# Patient Record
Sex: FEMALE | Race: WHITE | NOT HISPANIC OR LATINO | Employment: OTHER | URBAN - METROPOLITAN AREA
[De-identification: names, ages, dates, MRNs, and addresses within clinical notes are randomized per-mention and may not be internally consistent; named-entity substitution may affect disease eponyms.]

---

## 2017-08-29 ENCOUNTER — HOSPITAL ENCOUNTER (OUTPATIENT)
Dept: RADIOLOGY | Facility: MEDICAL CENTER | Age: 67
End: 2017-08-29
Attending: SPECIALIST
Payer: MEDICARE

## 2017-08-29 DIAGNOSIS — R05.9 COUGH: ICD-10-CM

## 2017-08-29 PROCEDURE — 71020 DX-CHEST-2 VIEWS: CPT

## 2017-09-12 ENCOUNTER — HOSPITAL ENCOUNTER (OUTPATIENT)
Dept: RADIOLOGY | Facility: MEDICAL CENTER | Age: 67
End: 2017-09-12
Attending: SPECIALIST
Payer: MEDICARE

## 2017-09-12 DIAGNOSIS — R91.1 LUNG NODULE: ICD-10-CM

## 2017-09-12 PROCEDURE — 700117 HCHG RX CONTRAST REV CODE 255: Performed by: SPECIALIST

## 2017-09-12 PROCEDURE — 71260 CT THORAX DX C+: CPT

## 2017-09-12 RX ADMIN — IOHEXOL 100 ML: 350 INJECTION, SOLUTION INTRAVENOUS at 09:45

## 2017-09-15 ENCOUNTER — OFFICE VISIT (OUTPATIENT)
Dept: PULMONOLOGY | Facility: HOSPICE | Age: 67
End: 2017-09-15
Payer: MEDICARE

## 2017-09-15 ENCOUNTER — HOSPITAL ENCOUNTER (OUTPATIENT)
Dept: LAB | Facility: MEDICAL CENTER | Age: 67
End: 2017-09-15
Attending: INTERNAL MEDICINE
Payer: MEDICARE

## 2017-09-15 VITALS
HEIGHT: 63 IN | SYSTOLIC BLOOD PRESSURE: 120 MMHG | WEIGHT: 163 LBS | BODY MASS INDEX: 28.88 KG/M2 | DIASTOLIC BLOOD PRESSURE: 74 MMHG | OXYGEN SATURATION: 92 % | TEMPERATURE: 97.3 F | HEART RATE: 84 BPM | RESPIRATION RATE: 16 BRPM

## 2017-09-15 DIAGNOSIS — J47.9 BRONCHIECTASIS WITHOUT COMPLICATION (HCC): ICD-10-CM

## 2017-09-15 LAB
ERYTHROCYTE [SEDIMENTATION RATE] IN BLOOD BY WESTERGREN METHOD: 15 MM/HOUR (ref 0–30)
RHEUMATOID FACT SER IA-ACNC: <10 IU/ML (ref 0–14)

## 2017-09-15 PROCEDURE — 82784 ASSAY IGA/IGD/IGG/IGM EACH: CPT | Mod: 91

## 2017-09-15 PROCEDURE — 99204 OFFICE O/P NEW MOD 45 MIN: CPT | Performed by: INTERNAL MEDICINE

## 2017-09-15 PROCEDURE — 82784 ASSAY IGA/IGD/IGG/IGM EACH: CPT

## 2017-09-15 PROCEDURE — 86431 RHEUMATOID FACTOR QUANT: CPT

## 2017-09-15 PROCEDURE — 82785 ASSAY OF IGE: CPT

## 2017-09-15 PROCEDURE — 36415 COLL VENOUS BLD VENIPUNCTURE: CPT

## 2017-09-15 PROCEDURE — 86606 ASPERGILLUS ANTIBODY: CPT | Mod: 91

## 2017-09-15 PROCEDURE — 85652 RBC SED RATE AUTOMATED: CPT

## 2017-09-15 PROCEDURE — 81220 CFTR GENE COM VARIANTS: CPT

## 2017-09-15 RX ORDER — DIAZEPAM 2 MG/1
4 TABLET ORAL
COMMUNITY
Start: 2017-08-29

## 2017-09-15 RX ORDER — ALBUTEROL SULFATE 90 UG/1
2 AEROSOL, METERED RESPIRATORY (INHALATION) EVERY 4 HOURS PRN
Qty: 1 INHALER | Refills: 11 | Status: SHIPPED
Start: 2017-09-15 | End: 2020-02-24

## 2017-09-15 NOTE — PATIENT INSTRUCTIONS
1. Recommend using the flutter device 3 times a day  2. Recommend using the saline nebulizers 3 times a day  3. We have ordered blood work  4. We have ordered pulmonary function testing  4. We have ordered a sputum culture  5. Recommend using the albuterol inhaler 3 times a day  6. Recommend follow-up in 4-5 weeks

## 2017-09-15 NOTE — PROGRESS NOTES
Betina Petit is a 67 y.o. female here for bronchiectasis.  Patient was referred by self.    History of Present Illness:    The patient is a 59-year-old female comes in for evaluation of an abnormal CT scan of the chest. She's had recurrent pneumonias and frequent coughing and mucus production. This is been going on for many years. In fact CT scan of the chest done in  also showed evidence of bronchiectasis. She had a recent chest x-ray which showed a right middle lobe infiltrate and a possible small nodule in the right midlung. A CT scan of the chest was then performed which showed extensive bronchiectasis with multiple areas of mucous plugging. The patient has no history of COPD or asthma. She does not take any inhalers. She smoked about one pack a day for 20 years quitting at the age of 34 years old. She does have a history of pancreatic insufficiency. She says her mother  of respiratory problems. She was also smoker. She denies any fevers chills or night sweats. She's had no hemoptysis.    Constitutional:  Negative for fever, chills, sweats, and fatigue.  Eyes:  Negative for eye pain and visual changes.  HENT:  Negative for tinnitus and hoarse voice.  Cardiovascular:  Negative for chest pain, leg swelling, syncope and orthopnea.  Respiratory:  See HPI for pertinent negatives  Sleep:  Negative for somnolence, loud snoring, sleep disturbance due to breathing, insomnia.  Gastrointestinal:  Negative for dysphagia, nausea and abdominal pain.  Heme/lymph:  Denies easy bruising, blood clots.  Musculoskeletal:  Negative for arthralgias, sore muscles and back pain.  Skin:  Negative for rash and color change.  Neurological:  Negative for headaches, lightheadedness and weakness.  Psychiatric:  Denies depression.    Current Outpatient Prescriptions   Medication Sig Dispense Refill   • diazepam (VALIUM) 2 MG Tab      • sterile water SOLN 2.1 mL with sodium chloride 4 mEq/mL SOLN 3.6 mEq 3 mL by Nebulization route 3  times a day. 90 Ampule 11   • albuterol (PROAIR HFA) 108 (90 Base) MCG/ACT Aero Soln inhalation aerosol Inhale 2 Puffs by mouth every four hours as needed for Shortness of Breath (wheezing). 1 Inhaler 11   • Pancrelipase, Lip-Prot-Amyl, 86621 UNITS Cap DR Particles Take  by mouth every day.     • CHOLESTYRAMINE PO Take  by mouth every day.     • hydrocodone-acetaminophen (NORCO) 5-325 MG Tab per tablet Take 1-2 Tabs by mouth every 6 hours as needed. 30 Tab 0   • oxycodone-acetaminophen (PERCOCET) 7.5-325 MG per tablet Take 1-2 Tabs by mouth every four hours as needed for Moderate Pain. (Patient not taking: Reported on 9/15/2017) 40 Tab 0   • cephALEXin (KEFLEX) 500 MG Cap Take 1 Cap by mouth every 6 hours. (Patient not taking: Reported on 9/15/2017) 40 Cap 0     No current facility-administered medications for this visit.        Social History   Substance Use Topics   • Smoking status: Former Smoker     Packs/day: 1.00     Years: 20.00     Types: Cigarettes     Start date: 1/1/1964     Quit date: 1/1/1984   • Smokeless tobacco: Never Used   • Alcohol use Yes      Comment: 5 per week       Past Medical History:   Diagnosis Date   • Pneumonia 06/2015   • Cancer (CMS-HCC) 2015    skin ca   • Bronchitis 2010   • Coughing blood     intermittent cough unsure if allergies   • Heart burn    • Indigestion    • Other specified disorder of intestines     diverticuli   • Other specified symptom associated with female genital organs     pelvic prolapse   • Pain     intestines       Past Surgical History:   Procedure Laterality Date   • HERNIA REPAIR  4/4/2016    Procedure: Abdominal wall HERNIA REPAIR  ;  Surgeon: Brandt Munson Jr., M.D.;  Location: SURGERY [facility];  Service:    • FLAP FREE Bilateral 4/4/2016    Procedure: bilateral muscle flap;  Surgeon: Brandt Munson Jr., M.D.;  Location: SURGERY [facility];  Service:    • LYSIS ADHESIONS GENERAL  4/4/2016    Procedure: LYSIS ADHESIONS GENERAL;  Surgeon:  "Brandt Munson Jr., M.D.;  Location: SURGERY [facility];  Service:    • COLOSTOMY TAKEDOWN  9/7/2015    Procedure: COLOSTOMY REVERSAL;  Surgeon: Jessica Flores M.D.;  Location: SURGERY [facility];  Service:    • COLON RESECTION LAPAROSCOPIC  6/22/2015    Procedure: COLON RESECTION LAPAROSCOPIC TO OPEN SIGMOID COLECTOMY;  Surgeon: Jessica Flores M.D.;  Location: SURGERY [facility];  Service:    • COLOSTOMY  6/22/2015    Procedure: COLOSTOMY POSSIBLE;  Surgeon: Jessica Flores M.D.;  Location: SURGERY [facility];  Service:    • CHOLECYSTECTOMY         Allergies:  Bee venom; Flagyl [metronidazole]; Sulfa drugs; and Tape    Family History   Problem Relation Age of Onset   • Cancer Mother    • Alcohol abuse Father    • Cancer Daughter        Physical Examination    Vitals:    09/15/17 0958   Height: 1.6 m (5' 3\")   Weight: 73.9 kg (163 lb)   Weight % change since last entry.: 0 %   BP: 120/74   Pulse: 84   BMI (Calculated): 28.87   Resp: 16   Temp: 36.3 °C (97.3 °F)   O2 sat % room air: 92 %       Physical Exam:  Constitutional:  Well developed and well nourished.  Head:  Normocephalic and atraumatic.  Nose:  Nose normal.  Mouth/Throat:  Oropharynx is clear and moist, no lesions.    Eyes:  Conjunctivae and EOM are normal.  Pupils are equal, round, and reactive to light.  Neck:  Normal range of motion.  Supple.  No JVD. No tracheal deviation.  No thyromegally  Cardiovascular:  Normal rate, regular rhythm, normal heart sounds and intact distal pulses.  Pulmonary/Chest:  No accessory muscle use.  No wheezing, rales but there is extensive bilateral rhonchi..  No dullness to percussion, tenderness or deformity.  Abdominal:  Soft.  No ascites.  No Hepatosplenomegally.  Non tender.  Musculoskeletal.  Normal range of motion.  No muscular atrophy.  Lymphadenopathy:  No cervical or supraclavicular adenopathy  Neurological:  Alert and oriented.  Cranial nerves intact.  No focal deficits  Skin:  No " rashes or ulcers.  Psyciatric:  Normal mood and affect.      Assessment and Plan:  1. Bronchiectasis without complication (CMS-HCC)  The patient has extensive diffuse bronchiectasis. She also has a history of pancreatic insufficiency. Must consider the possibility of cystic fibrosis. Other possibilities may include allergic bronchopulmonary aspergillosis, immotile cilia syndrome, alpha-1 antitrypsin deficiency, bronchiectasis related to rheumatoid arthritis, hypogammaglobulinemia, CHARIS lung disease, bronchiectasis related to prior infection/pneumonias. Cartilage deficiency is also a possibility. I have requested that she start using a flutter device 3 times a day and using a nebulizer with 7% saline 3 times a day. I would also like to use an albuterol inhaler 3 times a day. We will check her for cystic fibrosis and also ordered immunoglobulins Aspergillus precipitins, rheumatoid factor, sedimentation rate and will check a sputum and sputum AFB culture. Overall N to see her back when the above is been completed. She might require bronchoscopy. A nasal biopsy to assess ciliary function may also be beneficial.  - DME NEBULIZER  - DME OTHER  - CYSTIC FIBROSIS(CFTR)165 PATHOGENIC VARIANTS; Future  - CULTURE RESPIRATORY W/ [facility]M STN; Future  - AFB CULTURE; Future  - IMMUNOGLOBULINS A/E/G/M, SERUM  - AMB PULMONARY FUNCTION TEST/LAB; Future  - MISCELLANEOUS TEST; Future  - ASPERGILLUS PRECIPITINS; Future  - RHEUMATOID ARTHRITIS FACTOR; Future  - WESTERGREN SED RATE; Future          Followup Return in about 5 weeks (around 10/20/2017) for follow up visit with David Sun MD.

## 2017-09-17 LAB
IGA SERPL-MCNC: 288 MG/DL (ref 68–408)
IGE SERPL-ACNC: 32 KU/L
IGG SERPL-MCNC: 1000 MG/DL (ref 768–1632)
IGM SERPL-MCNC: 103 MG/DL (ref 35–263)

## 2017-09-18 RX ORDER — ALBUTEROL SULFATE 2.5 MG/3ML
2.5 SOLUTION RESPIRATORY (INHALATION) EVERY 4 HOURS PRN
Qty: 75 ML | Refills: 11 | Status: SHIPPED
Start: 2017-09-18 | End: 2020-02-24

## 2017-09-19 ENCOUNTER — PATIENT MESSAGE (OUTPATIENT)
Dept: PULMONOLOGY | Facility: HOSPICE | Age: 67
End: 2017-09-19

## 2017-09-19 NOTE — TELEPHONE ENCOUNTER
From: Betina Petit  To: David Sun M.D.  Sent: 9/19/2017 7:18 AM PDT  Subject: Prescription Question    I picked up the albuterol for the nebulizer and the pharmacist said I did not need the saline solution.  [facility] does not sell it. What is it for and do I need it?  Also [facility] finally brought over the nebulizer last night but said I needed to pay for it with a credit card  It was my understanding it was covered by medicare. The man who delivered it said just go to [facility] and get one. Please explain how this works.

## 2017-09-19 NOTE — TELEPHONE ENCOUNTER
I was able to verify with Tyler from [facility] that you were asked for payment in error, they will be contacting you and delivering the machine today.    Forwarding to Kerri to address the saline questions

## 2017-09-20 ENCOUNTER — HOSPITAL ENCOUNTER (OUTPATIENT)
Facility: MEDICAL CENTER | Age: 67
End: 2017-09-20
Attending: INTERNAL MEDICINE
Payer: MEDICARE

## 2017-09-20 DIAGNOSIS — J47.9 BRONCHIECTASIS WITHOUT COMPLICATION (HCC): ICD-10-CM

## 2017-09-20 LAB
ASPERGILLUS AB SER QL ID: NORMAL
ASPERGILLUS AB TITR SER CF: NORMAL {TITER}

## 2017-09-20 PROCEDURE — 87015 SPECIMEN INFECT AGNT CONCNTJ: CPT

## 2017-09-20 PROCEDURE — 87116 MYCOBACTERIA CULTURE: CPT

## 2017-09-20 PROCEDURE — 87206 SMEAR FLUORESCENT/ACID STAI: CPT

## 2017-09-20 PROCEDURE — 87205 SMEAR GRAM STAIN: CPT

## 2017-09-21 LAB
GRAM STN SPEC: NORMAL
RHODAMINE-AURAMINE STN SPEC: NORMAL
SIGNIFICANT IND 70042: NORMAL
SIGNIFICANT IND 70042: NORMAL
SITE SITE: NORMAL
SITE SITE: NORMAL
SOURCE SOURCE: NORMAL
SOURCE SOURCE: NORMAL

## 2017-09-21 NOTE — TELEPHONE ENCOUNTER
I did speak to patient and was able to get her sterile water through [facility] pharmacy. I called [facility] let the patient know.

## 2017-10-01 LAB
CF EXPANDED VARIANT PANEL INTERP Q4864: ABNORMAL
CFTR ALLELE 1 BLD/T QL: ABNORMAL
CFTR ALLELE 1 BLD/T QL: ABNORMAL
CFTR MUT ANL BLD/T: ABNORMAL

## 2017-10-02 ENCOUNTER — TELEPHONE (OUTPATIENT)
Dept: PULMONOLOGY | Facility: HOSPICE | Age: 67
End: 2017-10-02

## 2017-10-02 NOTE — TELEPHONE ENCOUNTER
[facility] called to inform Dr. Sun that the pt's AFB culture came back positive.    Last OV: 9/15/17  Bronchiectasis without comp.

## 2017-10-03 NOTE — TELEPHONE ENCOUNTER
David Sun M.D.   You 16 hours ago (4:35 PM)      Thank you.  Not surprised.   Will await the final cultures. (Routing comment)

## 2017-10-06 ENCOUNTER — TELEPHONE (OUTPATIENT)
Dept: PULMONOLOGY | Facility: HOSPICE | Age: 67
End: 2017-10-06

## 2017-10-06 NOTE — TELEPHONE ENCOUNTER
Other (more AFB results)     David Sun M.D.   You 1 hour ago (12:56 PM)      ok (Routing comment)

## 2017-10-06 NOTE — TELEPHONE ENCOUNTER
Michell Casiano w/ [facility] called and states that the pt tested positive for Avium complex.    9/15/17-Dr. Sun  Bronchiectasis w/o comp

## 2017-10-13 ENCOUNTER — NON-PROVIDER VISIT (OUTPATIENT)
Dept: PULMONOLOGY | Facility: HOSPICE | Age: 67
End: 2017-10-13
Payer: MEDICARE

## 2017-10-13 ENCOUNTER — OFFICE VISIT (OUTPATIENT)
Dept: PULMONOLOGY | Facility: HOSPICE | Age: 67
End: 2017-10-13
Payer: MEDICARE

## 2017-10-13 VITALS
SYSTOLIC BLOOD PRESSURE: 120 MMHG | HEART RATE: 100 BPM | TEMPERATURE: 98.4 F | OXYGEN SATURATION: 92 % | DIASTOLIC BLOOD PRESSURE: 72 MMHG | BODY MASS INDEX: 28.88 KG/M2 | WEIGHT: 163 LBS | HEIGHT: 63 IN | RESPIRATION RATE: 16 BRPM

## 2017-10-13 DIAGNOSIS — J47.9 BRONCHIECTASIS WITHOUT COMPLICATION (HCC): ICD-10-CM

## 2017-10-13 DIAGNOSIS — E84.9 CYSTIC FIBROSIS (HCC): ICD-10-CM

## 2017-10-13 PROCEDURE — 99214 OFFICE O/P EST MOD 30 MIN: CPT | Mod: 25 | Performed by: INTERNAL MEDICINE

## 2017-10-13 PROCEDURE — 94726 PLETHYSMOGRAPHY LUNG VOLUMES: CPT | Performed by: INTERNAL MEDICINE

## 2017-10-13 PROCEDURE — 94060 EVALUATION OF WHEEZING: CPT | Performed by: INTERNAL MEDICINE

## 2017-10-13 PROCEDURE — 94729 DIFFUSING CAPACITY: CPT | Performed by: INTERNAL MEDICINE

## 2017-10-13 RX ORDER — LEVALBUTEROL TARTRATE 45 UG/1
2 AEROSOL, METERED ORAL EVERY 4 HOURS PRN
Qty: 1 INHALER | Refills: 11 | Status: SHIPPED | OUTPATIENT
Start: 2017-10-13

## 2017-10-13 RX ORDER — LEVALBUTEROL INHALATION SOLUTION 1.25 MG/3ML
1.25 SOLUTION RESPIRATORY (INHALATION) EVERY 4 HOURS PRN
Qty: 75 ML | Refills: 11 | Status: ON HOLD
Start: 2017-10-13 | End: 2020-02-26

## 2017-10-13 RX ORDER — EPINEPHRINE 0.3 MG/.3ML
0.3 INJECTION SUBCUTANEOUS ONCE
Qty: 0.3 ML | Refills: 0 | Status: SHIPPED | OUTPATIENT
Start: 2017-10-13 | End: 2017-10-13

## 2017-10-13 ASSESSMENT — PULMONARY FUNCTION TESTS
FEV1/FVC_PERCENT_CHANGE: 143
FVC: 2.32
FVC_PERCENT_PREDICTED: 84
FEV1_PERCENT_PREDICTED: 67
FEV1/FVC_PERCENT_PREDICTED: 85
FEV1: 1.65
FVC_PERCENT_PREDICTED: 79
FEV1: 1.49
FEV1_PERCENT_CHANGE: 10
FEV1_PERCENT_CHANGE: 7
FEV1/FVC_PERCENT_PREDICTED: 76
FEV1/FVC: 66.53
FEV1_PERCENT_PREDICTED: 74
FEV1_PREDICTED: 2.22
FVC_PREDICTED: 2.93
FVC: 2.48
FEV1/FVC: 64
FEV1/FVC_PERCENT_PREDICTED: 88

## 2017-10-13 NOTE — PROCEDURES
Technician: Stanley Huynh, THIAGO/CPTIFFANY  The results of this test meet the ATS standards for acceptability and repeatability.  The Spirometry results meet the ATS/ERS standards acceptability & repeatability.  The DLCO was uncorrected for Hgb.  A bronchodilator of Xopenex HFA- 2 puffs via spacer  Administered.    1.  Spirometry shows moderate airflow obstruction and there was not a significant improvement after a bronchodilator although the mid flows improved by 49%  2.  Lung volumes show mild hyperinflation and moderate air trapping  3.  Diffusion capacity is increased  4.  Flow volume loops are consistent with airflow obstruction    Overall Impression: Moderate obstructive lung disease without significant reactivity and associated with an increased diffusion capacity. An increased effusion can be associated with polycythemia, left to right shunt, asthma, congestive heart failure, pulmonary hemorrhage and obesity.

## 2017-10-13 NOTE — PATIENT INSTRUCTIONS
1. Recommend using the flutter device to 3 times a day  2. Recommend saline nebulization twice a day  3. Recommend using Xopenex nebulizer twice a day  4. We have ordered some blood work  5. We have refilled the EpiPen's  6. We have made a referral to [facility] at their cystic fibrosis clinic

## 2017-10-13 NOTE — PROGRESS NOTES
Betina Petit is a 67 y.o. female here for cystic fibrosis.    History of Present Illness:    The patient is 59-year-old female comes in for follow-up. She presented with bronchiectasis and pancreatic insufficiency. 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  Pulmonary function tests show an FEV1 of 1.65 L which is 74% predicted with an FEV1/FVC ratio of 66%. Lung volumes are consistent with hyperinflation and air trapping in the diffusion capacity is 149% predicted. There was not a significant postbronchodilator response. A CT scan of the chest shows extensive bronchiectasis with some areas of mucous plugging but there does not appear to be significant reticular nodular type infiltrates. Alpha-1 antitrypsin genotype was MM. IgE level is 32. Aspergillus precipitins were negative. Immunoglobulin levels are normal. Sputum culture showed oropharyngeal contamination however the AFB sputum culture was positive for CHARIS. The patient has not been very compliant using her flutter device or her nebulizer. She does say that when she uses a flutter it seems to really help but bring up the mucus. She's not having any new complaints today.    Constitutional:  Negative for fever, chills, sweats, and fatigue.  Eyes:  Negative for eye pain and visual changes.  HENT:  Negative for tinnitus and hoarse voice.  Cardiovascular:  Negative for chest pain, leg swelling, syncope and orthopnea.  Respiratory:  See HPI for pertinent negatives  Sleep:  Negative for somnolence, loud snoring, sleep disturbance due to breathing, insomnia.  Gastrointestinal:  Negative for dysphagia, nausea and abdominal pain.  Heme/lymph:  Denies easy bruising, blood clots.  Musculoskeletal:  Negative for arthralgias, sore muscles and back pain.  Skin:  Negative for rash and color change.  Neurological:  Negative for headaches, lightheadedness and weakness.  Psychiatric:  Denies depression.    Current Outpatient  Prescriptions   Medication Sig Dispense Refill   • levalbuterol (XOPENEX) 1.25 MG/3ML Nebu Soln 3 mL by Nebulization route every four hours as needed for Shortness of Breath (Cough, Wheezing.). 75 mL 11   • levalbuterol (XOPENEX HFA) 45 MCG/ACT inhaler Inhale 2 Puffs by mouth every four hours as needed for Shortness of Breath. 1 Inhaler 11   • EPINEPHrine (EPIPEN 2-SHAVON) 0.3 MG/0.3ML Solution Auto-injector solution for injection 0.3 mL by Intramuscular route Once for 1 dose. 0.3 mL 0   • albuterol (PROVENTIL) 2.5mg/3ml Nebu Soln solution for nebulization 3 mL by Nebulization route every four hours as needed for Shortness of Breath. 75 mL 11   • diazepam (VALIUM) 2 MG Tab      • sterile water SOLN 2.1 mL with sodium chloride 4 mEq/mL SOLN 3.6 mEq 3 mL by Nebulization route 3 times a day. 90 Ampule 11   • albuterol (PROAIR HFA) 108 (90 Base) MCG/ACT Aero Soln inhalation aerosol Inhale 2 Puffs by mouth every four hours as needed for Shortness of Breath (wheezing). 1 Inhaler 11   • oxycodone-acetaminophen (PERCOCET) 7.5-325 MG per tablet Take 1-2 Tabs by mouth every four hours as needed for Moderate Pain. (Patient not taking: Reported on 9/15/2017) 40 Tab 0   • cephALEXin (KEFLEX) 500 MG Cap Take 1 Cap by mouth every 6 hours. (Patient not taking: Reported on 9/15/2017) 40 Cap 0   • Pancrelipase, Lip-Prot-Amyl, 67155 UNITS Cap DR Particles Take  by mouth every day.     • CHOLESTYRAMINE PO Take  by mouth every day.     • hydrocodone-acetaminophen (NORCO) 5-325 MG Tab per tablet Take 1-2 Tabs by mouth every 6 hours as needed. 30 Tab 0     No current facility-administered medications for this visit.        Social History   Substance Use Topics   • Smoking status: Former Smoker     Packs/day: 1.00     Years: 20.00     Types: Cigarettes     Start date: 1/1/1964     Quit date: 1/1/1984   • Smokeless tobacco: Never Used   • Alcohol use Yes      Comment: 5 per week       Past Medical History:   Diagnosis Date   • Pneumonia  "06/2015   • Cancer (CMS-[facility], [facility]) 2015    skin ca   • Bronchitis 2010   • Coughing blood     intermittent cough unsure if allergies   • Heart burn    • Indigestion    • Other specified disorder of intestines     diverticuli   • Other specified symptom associated with female genital organs     pelvic prolapse   • Pain     intestines       Past Surgical History:   Procedure Laterality Date   • HERNIA REPAIR  4/4/2016    Procedure: Abdominal wall HERNIA REPAIR  ;  Surgeon: Brandt Munson Jr., M.D.;  Location: SURGERY [facility];  Service:    • FLAP FREE Bilateral 4/4/2016    Procedure: bilateral muscle flap;  Surgeon: Brandt Munson Jr., M.D.;  Location: SURGERY [facility];  Service:    • LYSIS ADHESIONS GENERAL  4/4/2016    Procedure: LYSIS ADHESIONS GENERAL;  Surgeon: Brandt Munson Jr., M.D.;  Location: SURGERY [facility];  Service:    • COLOSTOMY TAKEDOWN  9/7/2015    Procedure: COLOSTOMY REVERSAL;  Surgeon: Jessica Flores M.D.;  Location: SURGERY [facility];  Service:    • COLON RESECTION LAPAROSCOPIC  6/22/2015    Procedure: COLON RESECTION LAPAROSCOPIC TO OPEN SIGMOID COLECTOMY;  Surgeon: Jessica Flores M.D.;  Location: SURGERY [facility];  Service:    • COLOSTOMY  6/22/2015    Procedure: COLOSTOMY POSSIBLE;  Surgeon: Jessica Flores M.D.;  Location: SURGERY [facility];  Service:    • CHOLECYSTECTOMY         Allergies:  Bee venom; Flagyl [metronidazole]; Sulfa drugs; and Tape    Family History   Problem Relation Age of Onset   • Cancer Mother    • Alcohol abuse Father    • Cancer Daughter        Physical Examination    Vitals:    10/13/17 0900   Height: 1.6 m (5' 3\")   Weight: 73.9 kg (163 lb)   Weight % change since last entry.: 0 %   BP: 120/72   Pulse: 100   BMI (Calculated): 28.87   Resp: 16   Temp: 36.9 °C (98.4 °F)   O2 sat % room air: 92 %       Physical Exam:  Constitutional:  Well developed and well nourished.  Head:  Normocephalic and atraumatic.  Nose:  " Nose normal.  Mouth/Throat:  Oropharynx is clear and moist, no lesions.    Neck:  Normal range of motion.  Supple.  No JVD.  Cardiovascular:  Normal rate, regular rhythm, normal heart sounds. No edema  Pulmonary/Chest: No wheezing, rales or rhonchi.  Respiratory effort non labored  Musculoskeletal.  No muscular atrophy.  Lymphadenopathy:  No cervical or supraclavicular adenopathy  Neurological:  Alert and oriented.  Cranial nerves intact.  No focal deficits  Skin:  No rashes or ulcers.  Psyciatric:  Normal mood and affect.    Assessment and Plan:  1. Cystic fibrosis (CMS-[facility])  The patient has cystic fibrosis associated with bronchiectasis and pancreatic insufficiency. She has a positive sputum culture for CHARIS although CT scan of the chest does not show any significant reticular nodular type infiltrates therefore I suspect the CHARIS is a colonizer. I have encouraged the patient to use her flutter device 3 times a day and to use a saline nebulizers twice a day and Xopenex nebulizer twice a day. I would like to see how she does with this regimen before considering additional measures such as chest percussion therapy or Pulmozyme. We will check her vitamin levels. I have referred the patient to the cystic fibrosis clinic at [place] to see if there is any further recommendations in terms of treatment or evaluation. I ordered a bone density.  - REFERRAL TO OTHER  - VITAMIN A; Future  - VITAMIN D,25 HYDROXY; Future        Followup Return in about 6 weeks (around 11/24/2017) for follow up visit with David Sun MD.

## 2017-10-25 LAB
MYCOBACTERIUM SPEC CULT: ABNORMAL
MYCOBACTERIUM SPEC CULT: ABNORMAL
RHODAMINE-AURAMINE STN SPEC: ABNORMAL
SIGNIFICANT IND 70042: ABNORMAL
SITE SITE: ABNORMAL
SOURCE SOURCE: ABNORMAL

## 2017-10-30 ENCOUNTER — TELEPHONE (OUTPATIENT)
Dept: PULMONOLOGY | Facility: HOSPICE | Age: 67
End: 2017-10-30

## 2017-10-30 NOTE — TELEPHONE ENCOUNTER
[facility]/ [facility] ([place] called requesting the Cystic Fibrosis full report on the pt.  I faxed over all labs done on 9/15/17 to [phone], [phone].

## 2018-02-04 ENCOUNTER — APPOINTMENT (OUTPATIENT)
Dept: RADIOLOGY | Facility: MEDICAL CENTER | Age: 68
End: 2018-02-04
Attending: EMERGENCY MEDICINE
Payer: MEDICARE

## 2018-02-04 ENCOUNTER — HOSPITAL ENCOUNTER (EMERGENCY)
Facility: MEDICAL CENTER | Age: 68
End: 2018-02-04
Attending: EMERGENCY MEDICINE
Payer: MEDICARE

## 2018-02-04 VITALS
SYSTOLIC BLOOD PRESSURE: 168 MMHG | HEIGHT: 63 IN | WEIGHT: 188.27 LBS | RESPIRATION RATE: 16 BRPM | TEMPERATURE: 97 F | HEART RATE: 108 BPM | BODY MASS INDEX: 33.36 KG/M2 | DIASTOLIC BLOOD PRESSURE: 108 MMHG | OXYGEN SATURATION: 92 %

## 2018-02-04 DIAGNOSIS — R07.89 CHEST TIGHTNESS: ICD-10-CM

## 2018-02-04 DIAGNOSIS — E84.9 CYSTIC FIBROSIS (HCC): ICD-10-CM

## 2018-02-04 LAB
ALBUMIN SERPL BCP-MCNC: 4 G/DL (ref 3.2–4.9)
ALBUMIN/GLOB SERPL: 1.7 G/DL
ALP SERPL-CCNC: 83 U/L (ref 30–99)
ALT SERPL-CCNC: 24 U/L (ref 2–50)
ANION GAP SERPL CALC-SCNC: 9 MMOL/L (ref 0–11.9)
AST SERPL-CCNC: 31 U/L (ref 12–45)
BASOPHILS # BLD AUTO: 0.5 % (ref 0–1.8)
BASOPHILS # BLD: 0.04 K/UL (ref 0–0.12)
BILIRUB SERPL-MCNC: 0.7 MG/DL (ref 0.1–1.5)
BUN SERPL-MCNC: 16 MG/DL (ref 8–22)
CALCIUM SERPL-MCNC: 9.7 MG/DL (ref 8.5–10.5)
CHLORIDE SERPL-SCNC: 103 MMOL/L (ref 96–112)
CO2 SERPL-SCNC: 24 MMOL/L (ref 20–33)
CREAT SERPL-MCNC: 0.8 MG/DL (ref 0.5–1.4)
EKG IMPRESSION: NORMAL
EOSINOPHIL # BLD AUTO: 0.09 K/UL (ref 0–0.51)
EOSINOPHIL NFR BLD: 1.2 % (ref 0–6.9)
ERYTHROCYTE [DISTWIDTH] IN BLOOD BY AUTOMATED COUNT: 45.9 FL (ref 35.9–50)
GLOBULIN SER CALC-MCNC: 2.4 G/DL (ref 1.9–3.5)
GLUCOSE SERPL-MCNC: 105 MG/DL (ref 65–99)
HCT VFR BLD AUTO: 44.7 % (ref 37–47)
HGB BLD-MCNC: 14.5 G/DL (ref 12–16)
IMM GRANULOCYTES # BLD AUTO: 0.01 K/UL (ref 0–0.11)
IMM GRANULOCYTES NFR BLD AUTO: 0.1 % (ref 0–0.9)
LIPASE SERPL-CCNC: 12 U/L (ref 11–82)
LYMPHOCYTES # BLD AUTO: 2.11 K/UL (ref 1–4.8)
LYMPHOCYTES NFR BLD: 27.3 % (ref 22–41)
MCH RBC QN AUTO: 31 PG (ref 27–33)
MCHC RBC AUTO-ENTMCNC: 32.4 G/DL (ref 33.6–35)
MCV RBC AUTO: 95.7 FL (ref 81.4–97.8)
MONOCYTES # BLD AUTO: 0.65 K/UL (ref 0–0.85)
MONOCYTES NFR BLD AUTO: 8.4 % (ref 0–13.4)
NEUTROPHILS # BLD AUTO: 4.83 K/UL (ref 2–7.15)
NEUTROPHILS NFR BLD: 62.5 % (ref 44–72)
NRBC # BLD AUTO: 0 K/UL
NRBC BLD-RTO: 0 /100 WBC
PLATELET # BLD AUTO: 188 K/UL (ref 164–446)
PMV BLD AUTO: 8.9 FL (ref 9–12.9)
POTASSIUM SERPL-SCNC: 4 MMOL/L (ref 3.6–5.5)
PROT SERPL-MCNC: 6.4 G/DL (ref 6–8.2)
RBC # BLD AUTO: 4.67 M/UL (ref 4.2–5.4)
SODIUM SERPL-SCNC: 136 MMOL/L (ref 135–145)
TROPONIN I SERPL-MCNC: <0.01 NG/ML (ref 0–0.04)
WBC # BLD AUTO: 7.7 K/UL (ref 4.8–10.8)

## 2018-02-04 PROCEDURE — 85025 COMPLETE CBC W/AUTO DIFF WBC: CPT

## 2018-02-04 PROCEDURE — 36415 COLL VENOUS BLD VENIPUNCTURE: CPT

## 2018-02-04 PROCEDURE — 83690 ASSAY OF LIPASE: CPT

## 2018-02-04 PROCEDURE — 84484 ASSAY OF TROPONIN QUANT: CPT

## 2018-02-04 PROCEDURE — 93005 ELECTROCARDIOGRAM TRACING: CPT | Performed by: EMERGENCY MEDICINE

## 2018-02-04 PROCEDURE — 71046 X-RAY EXAM CHEST 2 VIEWS: CPT

## 2018-02-04 PROCEDURE — 80053 COMPREHEN METABOLIC PANEL: CPT

## 2018-02-04 PROCEDURE — 93005 ELECTROCARDIOGRAM TRACING: CPT

## 2018-02-04 PROCEDURE — 700101 HCHG RX REV CODE 250: Performed by: EMERGENCY MEDICINE

## 2018-02-04 PROCEDURE — 99284 EMERGENCY DEPT VISIT MOD MDM: CPT

## 2018-02-04 RX ORDER — LEVALBUTEROL INHALATION SOLUTION 1.25 MG/3ML
1.25 SOLUTION RESPIRATORY (INHALATION) ONCE
Status: COMPLETED | OUTPATIENT
Start: 2018-02-04 | End: 2018-02-04

## 2018-02-04 RX ADMIN — LEVALBUTEROL HYDROCHLORIDE 1.25 MG: 1.25 SOLUTION RESPIRATORY (INHALATION) at 22:38

## 2018-02-04 RX ADMIN — IPRATROPIUM BROMIDE 0.5 MG: 0.5 SOLUTION RESPIRATORY (INHALATION) at 22:38

## 2018-02-04 ASSESSMENT — LIFESTYLE VARIABLES: DO YOU DRINK ALCOHOL: NO

## 2018-02-05 NOTE — ED NOTES
Agree with triage. Pt amb to red 9 with steady gait. Chart up for ERP. Pt placed in gown and on monitor

## 2018-02-05 NOTE — DISCHARGE INSTRUCTIONS
Please return to the ED w worsening difficulty breathing, worsening cough or fevers    Cystic Fibrosis  Cystic fibrosis is a disease that affects many different parts of your body. Cystic fibrosis is often thought of as a lung disease, and breathing (respiratory) failure is the most serious related problem. However, other parts of your body can also be affected, including your skin, pancreas, liver, intestines, sinuses, and sex organs.   Cystic fibrosis is caused by a problem with the way that your body's cells handle water and salt. This problem can make secretions produced by your body thicker than they should be. Mucus in the lungs is usually thin and watery. In cystic fibrosis, mucus in the lungs becomes thick and very sticky, making it difficult to breathe freely and causing frequent lung infections. Cystic fibrosis can also cause digestive juices from the pancreas and liver to be too thick, making it harder for the body to get the fat and protein it needs from food.   Cystic fibrosis is a lifelong disease. There is no cure. However, with care and treatment, people with the disease can feel good and lead productive lives.  CAUSES   Cystic fibrosis is an inherited disease caused by a defective gene. This defective gene leads to a change in a protein that affects all the cells in your body, including the cells that make mucus and sweat. The gene is inherited as a recessive trait. That means a person may have the gene without developing the disease. To develop cystic fibrosis, a person must inherit two copies of the defective gene, one from each parent.   RISK FACTORS  Factors that can increase your risk of cystic fibrosis include:   · A brother or sister with cystic fibrosis.  · A parent with cystic fibrosis.  · Parents that have the gene that causes cystic fibrosis.  · White race.  SIGNS AND SYMPTOMS   The signs and symptoms of cystic fibrosis vary from person to person. They also differ by age. Possible  symptoms include:  · Chronic coughing or wheezing.  · Shortness of breath.  · Frequent lung infections.  · Frequent sinusitis or small growths in the nose (nasal polyps).  · Difficulty gaining weight.  · Lack of normal growth (in children).    · Bulky or greasy bowel movements.    · Tissue from the rectum protruding from the anal opening (rectal prolapse).    · Inflammation of the pancreas (recurrent pancreatitis).    · Prolonged yellowing of the skin (jaundice).    · Unusually salty sweat or skin.    · Heat exhaustion with low salt in the blood.    · Club-shaped fingers or toes.    DIAGNOSIS   Diagnosis of cystic fibrosis usually occurs in early childhood. In many areas, newborns are routinely screened for the disease. This is done using a blood test to measure a chemical in the blood that is often elevated in newborns with the disease. Extra testing is done if a blood test is abnormal or if a new baby shows signs of the disease. Testing may also be done if a person shows signs of the disease at any age. The following tests are used to confirm the diagnosis:   · Sweat test. This is the standard test used to make a diagnosis of cystic fibrosis. It is also called a sweat chloride test. A chemical will be applied to make a patch of skin sweat. Then the sweat will be analyzed to see if it contains the chemicals present in cystic fibrosis.    · DNA testing. This can be used to determine if the genetic mutations found in cystic fibrosis are present.    Other tests are commonly done to determine the nature and extent of the disease. These may include:  · Blood tests to evaluate salt balance, liver function, vitamin levels, and blood counts.    · X-rays. Chest or sinus X-rays can show how the lungs and breathing system are affected by the disease.    · Lung (pulmonary) function tests. These tests determine how well the lungs are working.    · Sputum culture. This test analyzes mucus from the lungs to find out if there is  "an infection.    · Stool testing. This is done to see if the pancreas is working normally.    · Fertility testing. In males, ultrasonography of the testes might be done.    TREATMENT   There is no cure for cystic fibrosis. Treatment will focus on managing the symptoms and problems caused by the disease. The treatment for people with cystic fibrosis should be managed through a center that specializes in the disease, particularly a center that is accredited by the C[employer]ic Fibrosis Foundation. To find a center near you, go to the Cystic Fibrosis Foundation web site at www.CFF.org.   Treatment will vary and may include:   · Medicines to thin the mucus or to help with breathing.  · Antibiotic medicines to prevent or treat infections.  · Vaccines to prevent infections.  · Enzyme capsules to aid digestion.  · Dietary changes, such as:  ¨  A high-calorie diet to help maintain a healthy weight. This helps reduce coughing and breathing problems.    ¨  A high-salt diet or salt supplements. The body needs a balance of salt and other chemicals.  ¨  Nutritional supplements.    ¨ Drinking lots of fluids.    · Chest percussion (\"thumping\") treatments or other methods to help clear mucus from the airways.    · Surgery. In some cases, surgery may be done to remove nasal polyps, to relieve a digestive system blockage, or to perform a lung or liver transplant.  HOME CARE INSTRUCTIONS   · Take medicines only as directed by your health care provider.    · Perform any treatments to clear mucus as directed by your health care provider.    · Perform any recommended techniques to improve breathing.    · Follow any dietary instructions given by your health care provider.  · Do not smoke. Stay away from secondhand smoke.    · Exercise as directed by your health care provider. Be as physically active as possible.    · Stay up-to-date on immunizations or vaccinations.    · Wash your hands often. This will reduce the chance of " infection.  · Consider joining a support group for people with cystic fibrosis.  · Keep all follow-up visits as directed by your health care provider.    SEEK MEDICAL CARE IF:   · You do not feel like eating, or you are losing weight.    · You have no energy.    · You have constipation or diarrhea.   · You develop a fever.  SEEK IMMEDIATE MEDICAL CARE IF:  · You have difficulty breathing.    · You develop a worsening cough.  · There is a large increase in how much mucus your body makes, or there is a change in your mucus.   · You have new or severe abdominal pain.   · You develop yellowing of the skin or eyes.  · You are throwing up dark green fluid.    MAKE SURE YOU:  · Understand these instructions.  · Will watch your condition.  · Will get help right away if you are not doing well or get worse.     This information is not intended to replace advice given to you by your health care provider. Make sure you discuss any questions you have with your health care provider.     Document Released: 10/20/2009 Document Revised: 01/08/2016 Document Reviewed: 07/17/2014  [employer] Interactive Patient Education ©2016 [employer]

## 2018-02-05 NOTE — ED TRIAGE NOTES
"Chief Complaint   Patient presents with   • Chest Pressure   • Difficulty Breathing     Patient ambulatory to triage afte EKG. Patient states that was diagnosed with cystic fibrosis 2 months ago. Patient has now been having increasing chest tightness and difficulty breathing for almost a week. Patient attempted to take a breathing treatment at home today, but reports no relief. NAD observed in patient at this time. BP (!) 168/108   Pulse 88   Temp 36.1 °C (97 °F)   Resp 16   Ht 1.6 m (5' 3\")   Wt 85.4 kg (188 lb 4.4 oz)   LMP 01/04/2006   SpO2 95%   BMI 33.35 kg/m²     "

## 2018-02-05 NOTE — ED PROVIDER NOTES
"ED Provider Note    Scribed for Peri Crawford M.D. by Chandler Avila. 2/4/2018, 9:36 PM.    Primary care provider: None noted  Means of arrival: Walk in  History obtained from: Patient  History limited by: None    CHIEF COMPLAINT  Chief Complaint   Patient presents with   • Chest Pressure   • Difficulty Breathing       HPI  Betina Petit is a 67 y.o. female who presents to the Emergency Department complaining of constant chest pain with associated shortness of breath over the last week which has been progressively worsening. Patient states the chest pain is \"constricting\" in quality, which she believes might be due to congestion.  Patient states she has not been able to cough sputum as much as usual.  Today, patient was watching the Super Bowl when she started feeling bad with the chest pain. She also reports back pain and abdominal tightness. Patient denies any new or worsening leg swelling. She was diagnosed with cystic fibrosis a couple of months ago. She notes being seen at [facility] 3 days ago. Patient is currently only using saline. She has used albuterol, however, she had an adverse reaction with violent headache and shaking. She was advised by [facility] to stop taking albuterol due to this. Patient mentions being on vicodin at home which she takes occasionally. She is a former smoker.      REVIEW OF SYSTEMS  See HPI for further details. All other systems are negative.   C    PAST MEDICAL HISTORY   has a past medical history of Bronchitis (2010); Cancer (CMS-HCC) (2015); Coughing blood; Heart burn; Indigestion; Other specified disorder of intestines; Other specified symptom associated with female genital organs; Pain; and Pneumonia (06/2015).    SURGICAL HISTORY   has a past surgical history that includes cholecystectomy; colon resection laparoscopic (6/22/2015); colostomy (6/22/2015); colostomy takedown (9/7/2015); hernia repair (4/4/2016); flap free (Bilateral, 4/4/2016); and lysis adhesions general " "(4/4/2016).    SOCIAL HISTORY  Social History   Substance Use Topics   • Smoking status: Former Smoker     Packs/day: 1.00     Years: 20.00     Types: Cigarettes     Start date: 1/1/1964     Quit date: 1/1/1984   • Smokeless tobacco: Never Used   • Alcohol use Yes      Comment: 5 per week      History   Drug Use No       FAMILY HISTORY  Family History   Problem Relation Age of Onset   • Cancer Mother    • Alcohol abuse Father    • Cancer Daughter        CURRENT MEDICATIONS  Reviewed. See Encounter Summary.     ALLERGIES  Allergies   Allergen Reactions   • Albuterol    • Bee Venom    • Flagyl [Metronidazole] Vomiting     Severe nausea and vomiting     • Sulfa Drugs      unknown   • Tape      Paper tape is ok       PHYSICAL EXAM  VITAL SIGNS: BP (!) 168/108   Pulse 88   Temp 36.1 °C (97 °F)   Resp 16   Ht 1.6 m (5' 3\")   Wt 85.4 kg (188 lb 4.4 oz)   LMP 01/04/2006   SpO2 95%   BMI 33.35 kg/m²    Pulse ox interpretation: I interpret this pulse ox as normal.  Constitutional: Alert in no apparent distress.  HENT: No signs of trauma, Bilateral external ears normal, Nose normal.   Eyes: PERR, Conjunctiva normal, Non-icteric.   Neck: Normal range of motion, No tenderness, Supple, No stridor.   Lymphatic: No lymphadenopathy noted.   Cardiovascular: Regular rate and rhythm, no murmurs.   Thorax & Lungs: No respiratory distress, End expiratory wheezes at bilateral bases, No chest tenderness.   Abdomen: Bowel sounds normal, Soft, No tenderness, No pulsatile masses. No peritoneal signs.  Skin: Warm, Dry, No erythema, No rash.   Back: No bony tenderness, No CVA tenderness.   Extremities: Intact distal pulses, No edema, No tenderness, No cyanosis  Musculoskeletal: Good range of motion in all major joints. No tenderness to palpation or major deformities noted.   Neurologic: Alert and oriented, No focal deficits noted.         DIFFERENTIAL DIAGNOSIS AND WORK UP PLAN    9:36 PM Patient seen and examined at bedside. The " patient presents with chest pain and shortness of breath and the differential diagnosis includes but is not limited to progression of disease (cystic fibrosis, bronchiectasis), bronchitis, community acquired pneumonia . Ordered for DX chest, CBC, CMP, lipase, troponin, estimated GFR, EKG to evaluate. Patient will be treated with xopenex 1.25 mg/3 ml, atrovent 0.02% 0.5 mg for her symptoms.     DIAGNOSTIC STUDIES / PROCEDURES     LABS  Labs Reviewed   CBC WITH DIFFERENTIAL - Abnormal; Notable for the following:        Result Value    MCHC 32.4 (*)     MPV 8.9 (*)     All other components within normal limits   COMP METABOLIC PANEL - Abnormal; Notable for the following:     Glucose 105 (*)     All other components within normal limits   LIPASE   TROPONIN   ESTIMATED GFR     All labs were reviewed by me.    EKG  12- Lead EKG; interpreted by myself - Yvette  Normal sinus rhythm with a rate of 99 bpm.   Normal axis.  Normal intervals.   No ST elevation or depression, or abnormal T wave inversion   No widening of QRS complex   Good R wave progression   No diagnostic Q waves.   Unchanged from prior EKG  Clinical Impression: Sinus rhythm. There is baseline artifact.      RADIOLOGY  DX-CHEST-2 VIEWS   Final Result         1.  Hazy right lower lobe infiltrates.   2.  Stable right midlung nodular density, likely corresponding with underlying lung disease documented on recent CT exam.        The radiologist's interpretation of all radiological studies have been reviewed by me.    COURSE & MEDICAL DECISION MAKING  Pertinent Labs & Imaging studies reviewed. (See chart for details)    11:31 PM Patient reevaluated at bedside. Patient is doing well overall. She states the pain is improved after nebulizer treatment. Patient states she has a nebulizer prescription at home, which she will refill. She does not have a white count. Pulmonary advised not to start patient on zithro as it would not do much. Patient states she does not want  "antibiotics right now.    This is a 67 y.o. year old female who presents with shortness of breath like symptoms and history cystic fibrosis, her chest x-ray is concerning for possible increased infection but no elevated white blood cell and no fever she does have follow-up with [facility] and is feeling better after the Xopenex. She states she has a prescription for it already at home and she is going to fill it and will return to the ED with any new or worsening difficulty breathing fevers.    /81   Pulse (!) 108   Temp 36.1 °C (97 °F)   Resp 16   Ht 1.6 m (5' 3\")   Wt 85.4 kg (188 lb 4.4 oz)   LMP 01/04/2006   SpO2 92%   BMI 33.35 kg/m²      The patient will return for new or worsening symptoms and is stable at the time of discharge.    The patient is referred to a primary physician for blood pressure management, diabetic screening, and for all other preventative health concerns.      DISPOSITION:  Patient will be discharged home in stable condition.    FOLLOW UP:  With your physician in [facility], Emergency Dept  [address] [place] [id number] [phone]    If symptoms worsen      OUTPATIENT MEDICATIONS:  New Prescriptions    No medications on file        FINAL IMPRESSION  1. Cystic fibrosis (CMS-HCC)    2. Chest tightness          Chandler WOLFF (Scribe), am scribing for, and in the presence of, Peri Crawford M.D..    Electronically signed by: Chandler Avila (Janice), 2/4/2018    Peri WOLFF M.D. personally performed the services described in this documentation, as scribed by Chandler Avila in my presence, and it is both accurate and complete.    The note accurately reflects work and decisions made by me.  Peri Crawford  2/5/2018  2:45 AM    This dictation has been created using voice recognition software and/or scribes. The accuracy of the dictation is limited by the abilities of the software and the expertise " of the scribes. I expect there may be some errors of grammar and possibly content. I made every attempt to manually correct the errors within my dictation. However, errors related to voice recognition software and/or scribes may still exist and should be interpreted within the appropriate context.

## 2018-02-05 NOTE — ED NOTES
Patient given discharge teaching and education. Verbalizes understanding. Given chance to ask questions, all questions answered. Educated patient of signs and symptoms to returned to ER, and educated patient they can return for any concerning symptoms.  Patient states they have their belongings. Denies additional needs at this time. Reports pain is well controlled. Patient monitored every hour and PRN for safety and comfort. Patient ambulatory out of department.

## 2018-02-23 ENCOUNTER — OFFICE VISIT (OUTPATIENT)
Dept: PULMONOLOGY | Facility: HOSPICE | Age: 68
End: 2018-02-23
Payer: MEDICARE

## 2018-02-23 VITALS
HEART RATE: 86 BPM | HEIGHT: 63 IN | BODY MASS INDEX: 33.66 KG/M2 | OXYGEN SATURATION: 98 % | TEMPERATURE: 98.6 F | DIASTOLIC BLOOD PRESSURE: 82 MMHG | SYSTOLIC BLOOD PRESSURE: 132 MMHG | WEIGHT: 190 LBS | RESPIRATION RATE: 16 BRPM

## 2018-02-23 DIAGNOSIS — J47.9 BRONCHIECTASIS WITHOUT COMPLICATION (HCC): ICD-10-CM

## 2018-02-23 DIAGNOSIS — E84.8 PANCREATIC INSUFFICIENCY DUE TO CYSTIC FIBROSIS (HCC): ICD-10-CM

## 2018-02-23 DIAGNOSIS — K86.89 PANCREATIC INSUFFICIENCY DUE TO CYSTIC FIBROSIS (HCC): ICD-10-CM

## 2018-02-23 DIAGNOSIS — E84.9 CYSTIC FIBROSIS (HCC): ICD-10-CM

## 2018-02-23 DIAGNOSIS — A31.0 MAI (MYCOBACTERIUM AVIUM-INTRACELLULARE) (HCC): ICD-10-CM

## 2018-02-23 PROCEDURE — 99214 OFFICE O/P EST MOD 30 MIN: CPT | Performed by: INTERNAL MEDICINE

## 2018-02-23 RX ORDER — SODIUM CHLORIDE FOR INHALATION 7 %
VIAL, NEBULIZER (ML) INHALATION
Refills: 1 | COMMUNITY
Start: 2018-02-22 | End: 2020-02-24

## 2018-02-23 RX ORDER — SODIUM CHLORIDE FOR INHALATION 7 %
4 VIAL, NEBULIZER (ML) INHALATION
COMMUNITY
Start: 2018-02-12 | End: 2018-08-11

## 2018-02-23 RX ORDER — LEVALBUTEROL INHALATION SOLUTION 1.25 MG/3ML
1.25 SOLUTION RESPIRATORY (INHALATION)
COMMUNITY
Start: 2018-02-12 | End: 2018-08-11

## 2018-02-23 RX ORDER — LANCETS
1 EACH MISCELLANEOUS
COMMUNITY
Start: 2018-02-09 | End: 2020-02-24

## 2018-02-23 RX ORDER — BLOOD-GLUCOSE METER
EACH MISCELLANEOUS
Refills: 0 | COMMUNITY
Start: 2018-02-22 | End: 2020-02-24

## 2018-02-23 RX ORDER — LEVALBUTEROL TARTRATE 45 UG/1
2 AEROSOL, METERED ORAL
COMMUNITY
Start: 2018-02-12 | End: 2020-02-24

## 2018-02-23 RX ORDER — HYDROCODONE BITARTRATE AND ACETAMINOPHEN 5; 325 MG/1; MG/1
TABLET ORAL
COMMUNITY
Start: 2015-09-12 | End: 2020-02-24

## 2018-02-23 RX ORDER — ALENDRONATE SODIUM 70 MG/1
70 TABLET ORAL
COMMUNITY
Start: 2018-01-04 | End: 2018-07-03

## 2018-02-23 RX ORDER — DIAZEPAM 2 MG/1
2 TABLET ORAL
COMMUNITY
Start: 2017-08-29 | End: 2020-01-23

## 2018-02-23 NOTE — PROGRESS NOTES
Betina Petit is a 67 Y pleasant woman with adult-diagnosis of Cystic Fibrosis, genotype [id number]+5Tcis, positive sweat chloride test of 99 on 11/29/17, pt has multisystem involvement including pancreatic insufficiency. 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     I have included the note below from [facility].    .          Routine Medications:  Current Outpatient Prescriptions   Medication Sig Dispense Refill   • alendronate (FOSAMAX) 70 mg tablet take 1 Tab by mouth every 7 days Please dispense 90 days if possible 12 Tab 1   • amylase-lipase-protease (CREON) 180,000-36,000-114,000 unit CPDR Take 2 tabs PO with meals TID and 1 tab PO with snacks TID, 9 cap per day, 270 caps per month and 810 caps per 90 days, please dispense 90 days if possible 810 Cap 1   • CHOLECALCIFEROL, VITAMIN D3, (VITAMIN D3 PO) take 10,000 Units by mouth   • diazePAM (VALIUM) 2 mg tablet take 2 mg by mouth as needed (insomnia)   • dornase erich (PULMOZYME) 1 mg/mL nebulizer solution 2.5 mL by Inhalation route daily Please dispense 90 days if possible 225 mL 1   • HYDROcodone-acetaminophen (NORCO) 5-325 mg tablet take 1-2 Tabs by mouth every 6 hours as needed for Pain   • levalbuterol (XOPENEX) 1.25 mg/3 mL NEBU 3 mL by Inhalation route 4 times a day as needed 360 Vial 1   • levalbuterol (XOPENEX) 45 mcg/actuation inhaler 2 Puffs by Inhalation route every 4 hours as needed for Shortness of Breath   • sodium chloride 7 % 7% nebulizer solution 4 mL by RT Inhalation route 2 times a day Please dispense 90 days if possible 180 Vial 1     No current facility-administered medications for this visit.   Labs:   Recent Labs   01/04/18  1554 11/29/17  0826    132*   K 4.5 4.3   CO2 31* 30   BUN 18 16   CR 0.84 0.82    118   CA 9.3 9.8   MG 2.1 2.3     Recent Labs   01/04/18  1554 11/29/17  0826   WBC 5.7  "6.3   HGB 15.0 16.0*   HCT 44.1 48.0*    209     Recent Labs   01/04/18  1554 11/29/17  0826   AST 56* 89*   ALT 49 97*   TBIL 0.8 1.0   ALKP 89 100   ALB 3.4* 3.7     25-Hydroxy D, Total   Date Value Ref Range Status   01/04/2018 37 25 - 80 ng/mL Final   Comment:   Optimum levels in the normal population are 25-80    11/29/2017 47 25 - 80 ng/mL Final   Comment:   Optimum levels in the normal population are 25-80      Hemoglobin A1c   Date Value Ref Range Status   01/04/2018 5.6 <5.7 % Final   Comment:   This is a borderline result for hemoglobin A1c. If the patient is a known diabetic, it indicates excellent recent glycemic control. If the test was ordered for screening purposes, it indicates impaired glycemic control with a moderate risk of developing diabetes in the near future.    11/29/2017 5.7 <5.7 % Final   Comment:   This is a borderline result for hemoglobin A1c. If the patient is a known diabetic, it indicates excellent recent glycemic control. If the test was ordered for screening purposes, it indicates impaired glycemic control with a moderate risk of developing diabetes in the near future.      Total IgE   Date Value Ref Range Status   01/04/2018 76 <100 kU/L Final   11/29/2017 91 <100 kU/L Final     Interval Diagnostics:    Ct Chest Wo Iv Contrast  Result Date: 1/4/2018  IMPRESSION: 1. Bronchiectasis, mucus impaction, and tree-in-bud nodularity as described; these findings may represent cystic fibrosis alone versus cystic fibrosis with superimposed CHARIS infection or ABPA. \"Physician to Physician Radiology Consult Line: [phone]\" Signed     Dxa Adult  Result Date: 1/4/2018  IMPRESSION: The patient has osteoporosis, based on the lowest T-score. The patient has risk factors, including: history of glucocorticoid therapy. \"Physician to Physician Radiology Consult Line: [phone]\" Signed     Culture, Cystic Fibrosis   Date Value Ref Range Status   01/04/2018 3+ Mucoid Pseudomonas aeruginosa " Final   Comment:   for susceptibility testing, see - accession [id number] collected 11/30/2017 01/04/2018 Rare number Staphylococcus aureus Final   Comment:   This isolate is OXACILLIN/NAFCILLIN SUSCEPTIBLE. CEFAZOLIN is the preferred antibiotic for MSSA infections that do not involve the central nervous system (CNS). Cefazolin is better tolerated, less costly, and yields similar clinical outcomes as nafcillin for the treatment of MSSA bacteremia. For CNS infections, nafcillin is the preferred antibiotic, ID consult is recommended. Such staphylococci are susceptible to beta-lactamase inhibitor combinations, most cephalosporins, and carbapenems. **ALERT: VANCOMYCIN MAY BE INFERIOR TO BETA LACTAMS FOR TREATMENT OF THIS ISOLATE.  If penicillin is considered for therapy, please contact the laboratory for further testing.   11/30/2017 Rare number Staphylococcus aureus Final   Comment:   This isolate is OXACILLIN/NAFCILLIN SUSCEPTIBLE. CEFAZOLIN is the preferred antibiotic for MSSA infections that do not involve the central nervous system (CNS). Cefazolin is better tolerated, less costly, and yields similar clinical outcomes as nafcillin for the treatment of MSSA bacteremia. For CNS infections, nafcillin is the preferred antibiotic, ID consult is recommended. Such staphylococci are susceptible to beta-lactamase inhibitor combinations, most cephalosporins, and carbapenems. **ALERT: VANCOMYCIN MAY BE INFERIOR TO BETA LACTAMS FOR TREATMENT OF THIS ISOLATE.  If penicillin is considered for therapy, please contact the laboratory for further testing.   11/30/2017 3+ Mucoid Pseudomonas aeruginosa Final     Culture, Fungal   Date Value Ref Range Status   01/04/2018 No growth 21 days Final   11/30/2017 No growth 21 days Final     Culture, AFB   Date Value Ref Range Status   01/04/2018 Mycobacterium avium complex Preliminary   Comment:   detected in liquid medium.  (by PCR/nucleic acid amplification) NOTE: This test was  developed and its performance characteristics determined by [facility]. It has not been cleared or approved by the U.S. Food and Drug Administration. Such approval is not required for tests validated by the performing laboratory.  Complex members include M. avium subspecies and M.intracellulare.  Antimicrobial susceptibility testing pending.   11/30/2017 No growth at 42 days (of AFB) Final     PFTs FEV1  11/30/17 1.50L @ 68%  1/4/18 1.48L @ 67%    Overall Impression: Betina Petit is a 67 Y female with a h/o CF with multisystem involvement with baseline moderate OVD. She recently was diagnosed with Cystic Fibrosis and was transferred to [facility] for her CF care. She is currently not performing any AWC or inhaled therapies, discussed the importance of these therapies at length the patient and  today. Pt cites high co-pays as reasoning for absence of therapies, advised pt to contact insurance company to identify appropriate specialty and mail order pharmacies so that her prescriptions will be secured moving forward. Lung function stable today, pt is without pulmonary complaint.     Assessment/Plan by Active Problem  #. Cystic Fibrosis bronchiectasis with pulmonary manifestation  Due to infection secondary to MSSA and MPaer endobronchial colonization. Pt also with h/o CHARIS infection (unknown date, not treated previously)   - Sputum cx for C&S, AFB and fungus sent  - Appropriate airway clearance and inhaled therapies discussed at length with pt today. Pt is requesting new prescriptions so that she may fill rx in [place] due to lower out of pocket cost. Strongly encouraged pt to identify appropriate specialty and mail order pharmacy so that medication refills will be secured for the future.   > Start vest BID  > Albuterol Puffer BID (pt able to tolerate, use until Xopenex has been rec'd), HTS bid, pulmozyme daily  - Annual labs due   - If AFB +MAC, consider CF-ID clinic visit     #. CF related  endocrine disease:   Unknown (no OGTT on file)  - Annual outpatient 2-hour OGTT DUE, orders provided.    #. CF related Pansinusitis:  Increased congestion with recent URI   - NS sinus irrigation bid encouraged, readdress at next clinic visit   - Continue to monitor     #. CF related Pancreatic Insufficiency:   Improved on updated PERT recs   - Cont Creon 36,000, (2 w/meals and 1 w/snacks).  - Monitor for fatty/floating stools  - CF RD following     #. CF related Malabsorption:   Stable per vitamin labs 2017  - AQUADEKs bid recommended, will discuss with pt at next clinic visit   - Repeat Vitamin labs 2018     #. FEN:   Obesity per Body mass index is 32.94 kg/m². without hypermetabolism secondary to acute pulmonary exacerbation.  Last Recorded Weight  02/01/18 : 84.4 kg (185 lb 15.3 oz)  01/04/18 : 85.7 kg (188 lb 15 oz)  11/30/17 : 84.5 kg (186 lb 4.6 oz)  - Continue regular diet    #. CF related bone disease:   Osteoporosis - per last DEXA complete 1/4/18  - Fosamax rx sent today, administration instructions reviewed   - Cont calcium supplementation  - Outpatient DEXA annually     #. HCM: Pt to f/u with PCP for age-related screenings. UTD with DEXA, OGTT and Annual Labs DUE   Immunization History   Administered Date(s) Administered   • Flu vaccine QUAD (PF) 11/30/2017     Chief Complaint   Patient presents with   • Follow-Up     Cystic Fibrosis       HPI:  On today's evaluation she feels she is at baseline. No recent exacerbations. No chills or fever. She does have a chronic productive cough. She now has had several visits to [facility]. She is not on any inhaled antibiotics. She has started Pulmozyme. She is using an Acapella. She does not have a vest.    Past Medical History:   Diagnosis Date   • Bronchitis 2010   • Cancer (CMS-HCC) 2015    skin ca   • Coughing blood     intermittent cough unsure if allergies   • Heart burn    • Indigestion    • Other specified disorder of intestines      diverticuli   • Other specified symptom associated with female genital organs     pelvic prolapse   • Pain     intestines   • Pneumonia 06/2015       ROS:   Constitutional: Denies fevers, chills, night sweats, fatigue or weight loss  Eyes: Denies vision loss, pain, drainage, double vision  Ears, Nose, Throat: Denies earache, tinnitus, hoarseness  Cardiovascular: Denies chest pain, tightness, palpitations  Respiratory: See history of present illness  Sleep: Denies, snoring, apnea  GI: Denies abdominal pain, nausea, vomiting, diarrhea  : Denies frequent urination, hematuria, painful urination  Musculoskeletal: Denies back pain, painful joints, sore muscles  Neurological: Denies headaches, seizures  Skin: Denies rashes, color changes  Psychiatric: Denies depression or thoughts of suicide  Hematologic: Denies bleeding tendency or clotting tendency  Allergic/Immunologic: Denies rhinitis, skin sensitivity    Social History     Social History   • Marital status:      Spouse name: N/A   • Number of children: N/A   • Years of education: N/A     Occupational History   • Not on file.     Social History Main Topics   • Smoking status: Former Smoker     Packs/day: 1.00     Years: 20.00     Types: Cigarettes     Start date: 1/1/1964     Quit date: 1/1/1984   • Smokeless tobacco: Never Used   • Alcohol use Yes      Comment: 5 per week   • Drug use: No   • Sexual activity: Not on file     Other Topics Concern   • Not on file     Social History Narrative   • No narrative on file     Albuterol; Ambien [zolpidem]; Bee venom; Flagyl [metronidazole]; Sulfa drugs; and Tape  Current Outpatient Prescriptions on File Prior to Visit   Medication Sig Dispense Refill   • levalbuterol (XOPENEX) 1.25 MG/3ML Nebu Soln 3 mL by Nebulization route every four hours as needed for Shortness of Breath (Cough, Wheezing.). 75 mL 11   • levalbuterol (XOPENEX HFA) 45 MCG/ACT inhaler Inhale 2 Puffs by mouth every four hours as needed for Shortness  "of Breath. 1 Inhaler 11   • albuterol (PROVENTIL) 2.5mg/3ml Nebu Soln solution for nebulization 3 mL by Nebulization route every four hours as needed for Shortness of Breath. 75 mL 11   • diazepam (VALIUM) 2 MG Tab      • sterile water SOLN 2.1 mL with sodium chloride 4 mEq/mL SOLN 3.6 mEq 3 mL by Nebulization route 3 times a day. 90 Ampule 11   • albuterol (PROAIR HFA) 108 (90 Base) MCG/ACT Aero Soln inhalation aerosol Inhale 2 Puffs by mouth every four hours as needed for Shortness of Breath (wheezing). 1 Inhaler 11   • oxycodone-acetaminophen (PERCOCET) 7.5-325 MG per tablet Take 1-2 Tabs by mouth every four hours as needed for Moderate Pain. (Patient not taking: Reported on 9/15/2017) 40 Tab 0   • cephALEXin (KEFLEX) 500 MG Cap Take 1 Cap by mouth every 6 hours. (Patient not taking: Reported on 9/15/2017) 40 Cap 0   • Pancrelipase, Lip-Prot-Amyl, 75733 UNITS Cap DR Particles Take  by mouth every day.     • CHOLESTYRAMINE PO Take  by mouth every day.     • hydrocodone-acetaminophen (NORCO) 5-325 MG Tab per tablet Take 1-2 Tabs by mouth every 6 hours as needed. 30 Tab 0     No current facility-administered medications on file prior to visit.      Blood pressure 132/82, pulse 86, temperature 37 °C (98.6 °F), resp. rate 16, height 1.6 m (5' 3\"), weight 86.2 kg (190 lb), last menstrual period 01/04/2006, SpO2 98 %.  Family History   Problem Relation Age of Onset   • Cancer Mother    • Alcohol abuse Father    • Cancer Daughter        Physical Exam:    HEENT: PERRLA, EOMI, no scleral icterus, no nasal or oral lesions  Neck: No thyromegaly, no adenopathy, no bruits  Mallampatti: Grade II  Lungs: Equal breath sounds, no wheezes or crackles are heard on today's exam  Heart: Regular rate and rhythm, no gallops or murmurs  Abdomen: Soft, benign, no organomegaly  Extremities: No clubbing, cyanosis, or edema  Neurologic: Cranial nerve, motor, and sensory exam are normal    1. Cystic fibrosis (CMS-HCC)    2. BMI " 33.0-33.9,adult    3. Pancreatic insufficiency due to cystic fibrosis (CMS-[facility])    4. Bronchiectasis without complication (CMS-HCC)    5. CHARIS (mycobacterium avium-intracellulare) (CMS-HCC)        She has recently diagnosed mild cystic fibrosis. Her CT shows underlying bronchiectasis. At this time she is colonized with CHARIS, MSSA, and Pseudomonas. She does not appear to have active infection.  She will continue her current medications including Pulmozyme and Creon.  She has gained significant weight since starting the Creon.  She uses Xopenex via nebulizer as well as 7% saline.  She is using an Acapella.  She is not yet using her vest.  She will continue to follow-up on a regular basis with [facility].  We will see her back in 3 months or sooner if there are any issues.

## 2018-06-05 ENCOUNTER — APPOINTMENT (RX ONLY)
Dept: URBAN - METROPOLITAN AREA CLINIC 4 | Facility: CLINIC | Age: 68
Setting detail: DERMATOLOGY
End: 2018-06-05

## 2018-06-05 DIAGNOSIS — Z71.89 OTHER SPECIFIED COUNSELING: ICD-10-CM

## 2018-06-05 DIAGNOSIS — D22 MELANOCYTIC NEVI: ICD-10-CM

## 2018-06-05 DIAGNOSIS — D18.0 HEMANGIOMA: ICD-10-CM

## 2018-06-05 DIAGNOSIS — L57.0 ACTINIC KERATOSIS: ICD-10-CM

## 2018-06-05 DIAGNOSIS — L82.1 OTHER SEBORRHEIC KERATOSIS: ICD-10-CM

## 2018-06-05 DIAGNOSIS — L81.4 OTHER MELANIN HYPERPIGMENTATION: ICD-10-CM

## 2018-06-05 DIAGNOSIS — L85.3 XEROSIS CUTIS: ICD-10-CM

## 2018-06-05 DIAGNOSIS — L82.0 INFLAMED SEBORRHEIC KERATOSIS: ICD-10-CM

## 2018-06-05 PROBLEM — D22.5 MELANOCYTIC NEVI OF TRUNK: Status: ACTIVE | Noted: 2018-06-05

## 2018-06-05 PROBLEM — D18.01 HEMANGIOMA OF SKIN AND SUBCUTANEOUS TISSUE: Status: ACTIVE | Noted: 2018-06-05

## 2018-06-05 PROCEDURE — ? COUNSELING

## 2018-06-05 PROCEDURE — ? TREATMENT REGIMEN

## 2018-06-05 PROCEDURE — ? DEFER

## 2018-06-05 PROCEDURE — 99203 OFFICE O/P NEW LOW 30 MIN: CPT

## 2018-06-05 ASSESSMENT — LOCATION DETAILED DESCRIPTION DERM
LOCATION DETAILED: EPIGASTRIC SKIN
LOCATION DETAILED: LEFT MEDIAL BREAST 8-9:00 REGION
LOCATION DETAILED: RIGHT MID-UPPER BACK
LOCATION DETAILED: LEFT MEDIAL BREAST 11-12:00 REGION
LOCATION DETAILED: RIGHT INFERIOR UPPER BACK
LOCATION DETAILED: LEFT INFERIOR MEDIAL FOREHEAD
LOCATION DETAILED: LEFT MEDIAL BREAST 7-8:00 REGION
LOCATION DETAILED: LEFT MEDIAL SUPERIOR CHEST
LOCATION DETAILED: RIGHT SUPERIOR UPPER BACK

## 2018-06-05 ASSESSMENT — LOCATION ZONE DERM
LOCATION ZONE: TRUNK
LOCATION ZONE: FACE

## 2018-06-05 ASSESSMENT — LOCATION SIMPLE DESCRIPTION DERM
LOCATION SIMPLE: CHEST
LOCATION SIMPLE: LEFT FOREHEAD
LOCATION SIMPLE: ABDOMEN
LOCATION SIMPLE: RIGHT UPPER BACK
LOCATION SIMPLE: LEFT BREAST

## 2018-06-05 NOTE — PROCEDURE: DEFER
Detail Level: Detailed
Instructions (Optional): And will schedule an appointment in 1 month for treatment

## 2018-06-25 ENCOUNTER — APPOINTMENT (OUTPATIENT)
Dept: PULMONOLOGY | Facility: HOSPICE | Age: 68
End: 2018-06-25
Payer: MEDICARE

## 2018-07-23 ENCOUNTER — APPOINTMENT (RX ONLY)
Dept: URBAN - METROPOLITAN AREA CLINIC 4 | Facility: CLINIC | Age: 68
Setting detail: DERMATOLOGY
End: 2018-07-23

## 2018-07-23 DIAGNOSIS — L57.0 ACTINIC KERATOSIS: ICD-10-CM

## 2018-07-23 DIAGNOSIS — L21.8 OTHER SEBORRHEIC DERMATITIS: ICD-10-CM

## 2018-07-23 DIAGNOSIS — Z71.89 OTHER SPECIFIED COUNSELING: ICD-10-CM

## 2018-07-23 DIAGNOSIS — L82.0 INFLAMED SEBORRHEIC KERATOSIS: ICD-10-CM

## 2018-07-23 PROBLEM — D48.5 NEOPLASM OF UNCERTAIN BEHAVIOR OF SKIN: Status: ACTIVE | Noted: 2018-07-23

## 2018-07-23 PROCEDURE — 17004 DESTROY PREMAL LESIONS 15/>: CPT

## 2018-07-23 PROCEDURE — 99213 OFFICE O/P EST LOW 20 MIN: CPT | Mod: 25

## 2018-07-23 PROCEDURE — ? PRESCRIPTION

## 2018-07-23 PROCEDURE — 11100: CPT | Mod: 59

## 2018-07-23 PROCEDURE — 17110 DESTRUCTION B9 LES UP TO 14: CPT | Mod: 59

## 2018-07-23 PROCEDURE — ? COUNSELING

## 2018-07-23 PROCEDURE — ? LIQUID NITROGEN

## 2018-07-23 PROCEDURE — ? BIOPSY BY SHAVE METHOD

## 2018-07-23 RX ORDER — KETOCONAZOLE 20.5 MG/ML
SHAMPOO, SUSPENSION TOPICAL
Qty: 1 | Refills: 5 | Status: ERX

## 2018-07-23 ASSESSMENT — LOCATION ZONE DERM
LOCATION ZONE: ARM
LOCATION ZONE: FACE
LOCATION ZONE: TRUNK
LOCATION ZONE: LEG
LOCATION ZONE: NOSE
LOCATION ZONE: NECK

## 2018-07-23 ASSESSMENT — LOCATION SIMPLE DESCRIPTION DERM
LOCATION SIMPLE: RIGHT NOSE
LOCATION SIMPLE: LEFT FOREHEAD
LOCATION SIMPLE: RIGHT PRETIBIAL REGION
LOCATION SIMPLE: NOSE
LOCATION SIMPLE: RIGHT ELBOW
LOCATION SIMPLE: LEFT BREAST
LOCATION SIMPLE: LEFT EYEBROW
LOCATION SIMPLE: LEFT PRETIBIAL REGION
LOCATION SIMPLE: RIGHT FOREHEAD
LOCATION SIMPLE: RIGHT FOREARM
LOCATION SIMPLE: LEFT CHEEK
LOCATION SIMPLE: ABDOMEN
LOCATION SIMPLE: RIGHT ANTERIOR NECK
LOCATION SIMPLE: RIGHT EYEBROW
LOCATION SIMPLE: CHEST
LOCATION SIMPLE: RIGHT CHEEK
LOCATION SIMPLE: RIGHT BREAST

## 2018-07-23 ASSESSMENT — LOCATION DETAILED DESCRIPTION DERM
LOCATION DETAILED: RIGHT SUPERIOR LATERAL NECK
LOCATION DETAILED: RIGHT ELBOW
LOCATION DETAILED: RIGHT MEDIAL BREAST 2-3:00 REGION
LOCATION DETAILED: LEFT LATERAL SUPERIOR CHEST
LOCATION DETAILED: RIGHT PROXIMAL PRETIBIAL REGION
LOCATION DETAILED: RIGHT DISTAL DORSAL FOREARM
LOCATION DETAILED: RIGHT INFERIOR LATERAL NECK
LOCATION DETAILED: RIGHT FOREHEAD
LOCATION DETAILED: RIGHT MEDIAL SUPERIOR CHEST
LOCATION DETAILED: RIGHT SUPERIOR MEDIAL FOREHEAD
LOCATION DETAILED: LEFT SUPERIOR LATERAL BUCCAL CHEEK
LOCATION DETAILED: RIGHT NASAL DORSUM
LOCATION DETAILED: LEFT NASAL DORSUM
LOCATION DETAILED: LEFT DISTAL PRETIBIAL REGION
LOCATION DETAILED: UPPER STERNUM
LOCATION DETAILED: RIGHT CENTRAL EYEBROW
LOCATION DETAILED: LEFT INFERIOR FOREHEAD
LOCATION DETAILED: MIDDLE STERNUM
LOCATION DETAILED: RIGHT LATERAL SUPERIOR CHEST
LOCATION DETAILED: LEFT MEDIAL SUPERIOR CHEST
LOCATION DETAILED: RIGHT CLAVICULAR NECK
LOCATION DETAILED: LEFT CENTRAL EYEBROW
LOCATION DETAILED: LEFT LATERAL BUCCAL CHEEK
LOCATION DETAILED: LEFT MEDIAL FOREHEAD
LOCATION DETAILED: RIGHT INFERIOR NASAL CHEEK
LOCATION DETAILED: LEFT MEDIAL BREAST 10-11:00 REGION
LOCATION DETAILED: XIPHOID
LOCATION DETAILED: RIGHT SUPERIOR FOREHEAD
LOCATION DETAILED: RIGHT INFERIOR FOREHEAD
LOCATION DETAILED: RIGHT NASAL SIDEWALL

## 2018-07-23 NOTE — PROCEDURE: BIOPSY BY SHAVE METHOD
Lab Facility: 
Hemostasis: Drysol
Lab: [id number]
Notification Instructions: Patient will be notified of biopsy results. However, patient instructed to call the office if not contacted within 2 weeks.
Type Of Destruction Used: Curettage
Dressing: bandage
Wound Care: Vaseline
Bill For Surgical Tray: no
Depth Of Biopsy: dermis
Cryotherapy Text: The wound bed was treated with cryotherapy after the biopsy was performed.
Was A Bandage Applied: Yes
Size Of Lesion In Cm: 0
Anesthesia Type: 1% lidocaine with 1:100,000 epinephrine and 408mcg clindamycin/ml and a 1:10 solution of 8.4% sodium bicarbonate
Consent: Written consent was obtained and risks were reviewed including but not limited to scarring, infection, bleeding, scabbing, incomplete removal, nerve damage and allergy to anesthesia.
Post-Care Instructions: I reviewed with the patient in detail post-care instructions. Patient is to keep the biopsy site dry overnight, and then apply bacitracin twice daily until healed. Patient may apply hydrogen peroxide soaks to remove any crusting.
Anesthesia Volume In Cc: 1
Detail Level: Detailed
Electrodesiccation And Curettage Text: The wound bed was treated with electrodesiccation and curettage after the biopsy was performed.
Biopsy Method: Personna blade
Biopsy Type: H and E
Electrodesiccation Text: The wound bed was treated with electrodesiccation after the biopsy was performed.
Curettage Text: The wound bed was treated with curettage after the biopsy was performed.
Billing Type: Third-Party Bill
Silver Nitrate Text: The wound bed was treated with silver nitrate after the biopsy was performed.

## 2019-03-15 ENCOUNTER — OFFICE VISIT (OUTPATIENT)
Dept: PULMONOLOGY | Facility: HOSPICE | Age: 69
End: 2019-03-15
Payer: MEDICARE

## 2019-03-15 VITALS
HEART RATE: 74 BPM | OXYGEN SATURATION: 94 % | SYSTOLIC BLOOD PRESSURE: 126 MMHG | TEMPERATURE: 97.7 F | HEIGHT: 63 IN | DIASTOLIC BLOOD PRESSURE: 82 MMHG | WEIGHT: 195 LBS | BODY MASS INDEX: 34.55 KG/M2 | RESPIRATION RATE: 16 BRPM

## 2019-03-15 DIAGNOSIS — K86.89 PANCREATIC INSUFFICIENCY DUE TO CYSTIC FIBROSIS (HCC): ICD-10-CM

## 2019-03-15 DIAGNOSIS — J47.9 BRONCHIECTASIS WITHOUT COMPLICATION (HCC): ICD-10-CM

## 2019-03-15 DIAGNOSIS — A31.0 MAI (MYCOBACTERIUM AVIUM-INTRACELLULARE) (HCC): ICD-10-CM

## 2019-03-15 DIAGNOSIS — E84.9 CYSTIC FIBROSIS (HCC): ICD-10-CM

## 2019-03-15 DIAGNOSIS — E84.8 PANCREATIC INSUFFICIENCY DUE TO CYSTIC FIBROSIS (HCC): ICD-10-CM

## 2019-03-15 PROCEDURE — 99214 OFFICE O/P EST MOD 30 MIN: CPT | Performed by: INTERNAL MEDICINE

## 2019-03-15 RX ORDER — ALENDRONATE SODIUM 70 MG/1
TABLET ORAL
COMMUNITY
Start: 2018-12-27 | End: 2020-02-24

## 2019-03-15 RX ORDER — LANCETS
1 EACH MISCELLANEOUS
COMMUNITY
Start: 2018-10-12 | End: 2020-02-24

## 2019-03-15 RX ORDER — SUCRALFATE 1 G/1
1 TABLET ORAL
COMMUNITY
Start: 2018-10-10 | End: 2020-02-24

## 2019-03-15 RX ORDER — CIPROFLOXACIN 750 MG/1
TABLET, FILM COATED ORAL
COMMUNITY
Start: 2019-01-21 | End: 2020-02-24

## 2019-03-15 RX ORDER — DIAZEPAM 2 MG/1
2 TABLET ORAL
COMMUNITY
Start: 2017-08-29 | End: 2020-01-23

## 2019-03-15 RX ORDER — LEVALBUTEROL INHALATION SOLUTION 1.25 MG/3ML
SOLUTION RESPIRATORY (INHALATION)
COMMUNITY
Start: 2018-09-12 | End: 2020-02-24

## 2019-03-15 RX ORDER — LEVOFLOXACIN 750 MG/1
TABLET, FILM COATED ORAL
COMMUNITY
Start: 2019-01-18 | End: 2020-02-24

## 2019-03-15 RX ORDER — OSELTAMIVIR PHOSPHATE 75 MG/1
CAPSULE ORAL
COMMUNITY
Start: 2019-01-16 | End: 2020-02-24

## 2019-03-15 RX ORDER — LEVALBUTEROL TARTRATE 45 UG/1
2 AEROSOL, METERED ORAL
COMMUNITY
Start: 2018-09-12 | End: 2020-02-24

## 2019-03-15 RX ORDER — BENZONATATE 100 MG/1
CAPSULE ORAL
COMMUNITY
Start: 2019-01-17 | End: 2020-02-24

## 2019-03-15 RX ORDER — POLYETHYLENE GLYCOL 3350 17 G/17G
34 POWDER, FOR SOLUTION ORAL
COMMUNITY
Start: 2019-01-18 | End: 2020-01-18

## 2019-03-15 RX ORDER — HYDROCODONE BITARTRATE AND ACETAMINOPHEN 5; 325 MG/1; MG/1
1-2 TABLET ORAL
COMMUNITY
Start: 2015-09-12 | End: 2020-02-24

## 2019-03-15 ASSESSMENT — PAIN SCALES - GENERAL: PAINLEVEL: NO PAIN

## 2019-03-15 NOTE — LETTER
Tommy Vidal M.D.  [facility]   [address] [place] [address] [address] [address]  Phone: [phone] - Fax: [phone]           Encounter Date: 3/15/2019  Provider: Tommy Vidal M.D.  Location of Care: [facility] PULMONARY [facility]      Patient:   Betina Petit   MR Number: [id number]   YOB: 1950     PROGRESS NOTE:  Chief Complaint   Patient presents with   • Follow-Up     Adult Cystic Fibrosis       HPI:  Betina Petit is a 68 Y pleasant woman with adult-diagnosis of Cystic Fibrosis, genotype [id number]+5Tcis, positive sweat chloride test of 99 on 11/29/17, pt has multisystem involvement including pancreatic insufficiency. 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   The patient sputum cultures in the past have demonstrated CHARIS which does not require treatment to this point.  She is also growing mucoid Pseudomonas and MSSA.  She has been seen and followed at [facility] clinic.  They have recommended Pulmozyme, Acapella therapy, Xopenex, saline inhalation, and vest therapy.  They have also suggested inhaled DARCY.  Recommendations include Creon which she uses daily.  They have also recommended Kalydeco.    #. Cystic Fibrosis bronchiectasis with pulmonary manifestation  MSSA and MPaer endobronchial colonization with recurrent MAC since Jan 2018 - colonization vs infection. Baseline CT on file from Jan 2018   - Sputum cx for C&S, AFB and fungus per CFF guidelines; AFB at every visit.   - Continue airway clearance; reviewed correct order of nebs  > Xopenex BID, HTS bid, > Acapella BID > pulmozyme daily  - Unable to start Darcy podhaler due to financial co-pay cost  - See Dr. Randle, ID   - Patient unable to start Kalydeco due to financial co-pay cost and PA for Symdeko denied   - Continue to encourage daily ambulation for  exercise.   - Given declined in FEV1 despite using her Acapella regularly, we recommend getting her a HFCWO-Vest to help optimize secretion clearance. CF RT will help discuss ordering a vest for patient.    Patient has been unable to afford some of the medications.  She is not using inhaled DARCY and not using Kalydeco.  She works as a  and is having trouble taking an hour and a half to take all of her inhaled treatments.  She is concerned about returning to [place] since she is not adhering closely to the program outlined.  I have urged her to keep her appointment and to consider at least using Pulmozyme on a more regular basis.  She feels that her major problem has been with her malabsorption and that has been taking care of nicely with the Creon.          Past Medical History:   Diagnosis Date   • Bronchitis 2010   • Cancer (HCC) 2015    skin ca   • Coughing blood     intermittent cough unsure if allergies   • Heart burn    • Indigestion    • Other specified disorder of intestines     diverticuli   • Other specified symptom associated with female genital organs     pelvic prolapse   • Pain     intestines   • Pneumonia 06/2015       ROS:   Constitutional: Denies fevers, chills, night sweats, fatigue or weight loss  Eyes: Denies vision loss, pain, drainage, double vision  Ears, Nose, Throat: Denies earache, tinnitus, hoarseness  Cardiovascular: Denies chest pain, tightness, palpitations  Respiratory: See HPI  Sleep: Denies, snoring, apnea  GI: See HPI  : Denies frequent urination, hematuria, painful urination  Musculoskeletal: Denies back pain, painful joints, sore muscles  Neurological: Denies headaches, seizures  Skin: Denies rashes, color changes  Psychiatric: Denies depression or thoughts of suicide  Hematologic: Denies bleeding tendency or clotting tendency  Allergic/Immunologic: Denies rhinitis, skin sensitivity    Social History     Social History   • Marital status:      Spouse  name: N/A   • Number of children: N/A   • Years of education: N/A     Occupational History   • Not on file.     Social History Main Topics   • Smoking status: Former Smoker     Packs/day: 1.00     Years: 20.00     Types: Cigarettes     Start date: 1/1/1964     Quit date: 1/1/1984   • Smokeless tobacco: Never Used   • Alcohol use Yes      Comment: 5 per week   • Drug use: No   • Sexual activity: Not on file     Other Topics Concern   • Not on file     Social History Narrative   • No narrative on file     Albuterol; Ambien [zolpidem]; Bee venom; Flagyl [metronidazole]; Sulfa drugs; and Tape  Current Outpatient Prescriptions on File Prior to Visit   Medication Sig Dispense Refill   • Multiple Vitamin (MULTI-VITAMIN DAILY PO) Take 10,000 Units by mouth.     • Pancrelipase, Lip-Prot-Amyl, (CREON) 17757 units Cap DR Particles Take 2 tabs PO w/ meals TID & 1 tab PO w/ snacks TID, 9 cap/day, 270 caps per month, & 810 caps per 90 days, please disp 90 days if possible     • Cholecalciferol (VITAMIN D PO) Take  by mouth.     • levalbuterol (XOPENEX) 1.25 MG/3ML Nebu Soln 3 mL by Nebulization route every four hours as needed for Shortness of Breath (Cough, Wheezing.). 75 mL 11   • levalbuterol (XOPENEX HFA) 45 MCG/ACT inhaler Inhale 2 Puffs by mouth every four hours as needed for Shortness of Breath. 1 Inhaler 11   • Blood Glucose Monitoring Suppl (ONE TOUCH ULTRA 2) w/Device Kit U UTD TO TEST BLOOD SUGAR  0   • glucose blood (ONE TOUCH ULTRA TEST) strip 1 Each by Other route.     • PULMOZYME 1 MG/ML Solution VVN D  1   • sodium chloride (HYPER-SAL) 7 % Nebu Soln VVN BID  1   • ONE TOUCH ULTRASOFT LANCETS Misc 1 Each by Other route.     • diazePAM (VALIUM) 2 MG Tab Take 2 mg by mouth.     • HYDROcodone-acetaminophen (NORCO) 5-325 MG Tab per tablet Take  by mouth.     • levalbuterol (XOPENEX HFA) 45 MCG/ACT inhaler 2 Puffs by Nebulization route.     • albuterol (PROVENTIL) 2.5mg/3ml Nebu Soln solution for nebulization 3 mL by  "Nebulization route every four hours as needed for Shortness of Breath. 75 mL 11   • diazepam (VALIUM) 2 MG Tab      • sterile water SOLN 2.1 mL with sodium chloride 4 mEq/mL SOLN 3.6 mEq 3 mL by Nebulization route 3 times a day. 90 Ampule 11   • albuterol (PROAIR HFA) 108 (90 Base) MCG/ACT Aero Soln inhalation aerosol Inhale 2 Puffs by mouth every four hours as needed for Shortness of Breath (wheezing). 1 Inhaler 11   • oxycodone-acetaminophen (PERCOCET) 7.5-325 MG per tablet Take 1-2 Tabs by mouth every four hours as needed for Moderate Pain. (Patient not taking: Reported on 9/15/2017) 40 Tab 0   • cephALEXin (KEFLEX) 500 MG Cap Take 1 Cap by mouth every 6 hours. (Patient not taking: Reported on 9/15/2017) 40 Cap 0   • Pancrelipase, Lip-Prot-Amyl, 02122 UNITS Cap DR Particles Take  by mouth every day.     • CHOLESTYRAMINE PO Take  by mouth every day.     • hydrocodone-acetaminophen (NORCO) 5-325 MG Tab per tablet Take 1-2 Tabs by mouth every 6 hours as needed. 30 Tab 0     No current facility-administered medications on file prior to visit.      Blood pressure 126/82, pulse 74, temperature 36.5 °C (97.7 °F), temperature source Oral, resp. rate 16, height 1.6 m (5' 3\"), weight 88.5 kg (195 lb), last menstrual period 01/04/2006, SpO2 94 %, not currently breastfeeding.  Family History   Problem Relation Age of Onset   • Cancer Mother    • Alcohol abuse Father    • Cancer Daughter        Physical Exam:  No distress at rest on room air  HEENT: PERRLA, EOMI, no scleral icterus, no nasal or oral lesions  Neck: No thyromegaly, no adenopathy, no bruits  Mallampatti: Grade II  Lungs: Equal breath sounds, no wheezes or crackles on today's evaluation  Heart: Regular rate and rhythm, no gallops or murmurs  Abdomen: Soft, benign, no organomegaly  Extremities: No clubbing, cyanosis, or edema  Neurologic: Cranial nerve, motor, and sensory exam are normal    1. Cystic fibrosis (HCC)    2. BMI 33.0-33.9,adult    3. Pancreatic " insufficiency due to cystic fibrosis (HCC)    4. Bronchiectasis without complication (HCC)    5. CHARIS (mycobacterium avium-intracellulare) (HCC)        This woman was diagnosed with cystic fibrosis at age 67.  She has had a bit of a decline in her pulmonary function.  She is having difficulty adhering to a strict regimen of airway clearance.  We did discuss her disease process as mild compared to most patients with cystic fibrosis.  However, we did urge her to utilize airway clearance procedures as much as possible.  I have asked her to return to [place] for repeat evaluation.  We will see her in several months and follow.      Electronically signed by Tommy Vidal M.D.  on 03/16/19    No Recipients

## 2019-03-16 NOTE — PROGRESS NOTES
Chief Complaint   Patient presents with   • Follow-Up     Adult Cystic Fibrosis       HPI:  Betina Petit is a 68 Y pleasant woman with adult-diagnosis of Cystic Fibrosis, genotype [id number]+5Tcis, positive sweat chloride test of 99 on 11/29/17, pt has multisystem involvement including pancreatic insufficiency. 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   The patient sputum cultures in the past have demonstrated CHARIS which does not require treatment to this point.  She is also growing mucoid Pseudomonas and MSSA.  She has been seen and followed at [facility] CF clinic.  They have recommended Pulmozyme, Acapella therapy, Xopenex, saline inhalation, and vest therapy.  They have also suggested inhaled DARCY.  Recommendations include Creon which she uses daily.  They have also recommended Kalydeco.    #. Cystic Fibrosis bronchiectasis with pulmonary manifestation  MSSA and MPaer endobronchial colonization with recurrent MAC since Jan 2018 - colonization vs infection. Baseline CT on file from Jan 2018   - Sputum cx for C&S, AFB and fungus per CFF guidelines; AFB at every visit.   - Continue airway clearance; reviewed correct order of nebs  > Xopenex BID, HTS bid, > Acapella BID > pulmozyme daily  - Unable to start Darcy podhaler due to financial co-pay cost  - See Dr. Randle, ID   - Patient unable to start Kalydeco due to financial co-pay cost and PA for Symdeko denied   - Continue to encourage daily ambulation for exercise.   - Given declined in FEV1 despite using her Acapella regularly, we recommend getting her a HFCWO-Vest to help optimize secretion clearance. CF RT will help discuss ordering a vest for patient.    Patient has been unable to afford some of the medications.  She is not using inhaled DARCY and not using Kalydeco.  She works as a  and is having trouble taking  an hour and a half to take all of her inhaled treatments.  She is concerned about returning to [place] since she is not adhering closely to the program outlined.  I have urged her to keep her appointment and to consider at least using Pulmozyme on a more regular basis.  She feels that her major problem has been with her malabsorption and that has been taking care of nicely with the Creon.          Past Medical History:   Diagnosis Date   • Bronchitis 2010   • Cancer (HCC) 2015    skin ca   • Coughing blood     intermittent cough unsure if allergies   • Heart burn    • Indigestion    • Other specified disorder of intestines     diverticuli   • Other specified symptom associated with female genital organs     pelvic prolapse   • Pain     intestines   • Pneumonia 06/2015       ROS:   Constitutional: Denies fevers, chills, night sweats, fatigue or weight loss  Eyes: Denies vision loss, pain, drainage, double vision  Ears, Nose, Throat: Denies earache, tinnitus, hoarseness  Cardiovascular: Denies chest pain, tightness, palpitations  Respiratory: See HPI  Sleep: Denies, snoring, apnea  GI: See HPI  : Denies frequent urination, hematuria, painful urination  Musculoskeletal: Denies back pain, painful joints, sore muscles  Neurological: Denies headaches, seizures  Skin: Denies rashes, color changes  Psychiatric: Denies depression or thoughts of suicide  Hematologic: Denies bleeding tendency or clotting tendency  Allergic/Immunologic: Denies rhinitis, skin sensitivity    Social History     Social History   • Marital status:      Spouse name: N/A   • Number of children: N/A   • Years of education: N/A     Occupational History   • Not on file.     Social History Main Topics   • Smoking status: Former Smoker     Packs/day: 1.00     Years: 20.00     Types: Cigarettes     Start date: 1/1/1964     Quit date: 1/1/1984   • Smokeless tobacco: Never Used   • Alcohol use Yes      Comment: 5 per week   • Drug use: No   • Sexual  activity: Not on file     Other Topics Concern   • Not on file     Social History Narrative   • No narrative on file     Albuterol; Ambien [zolpidem]; Bee venom; Flagyl [metronidazole]; Sulfa drugs; and Tape  Current Outpatient Prescriptions on File Prior to Visit   Medication Sig Dispense Refill   • Multiple Vitamin (MULTI-VITAMIN DAILY PO) Take 10,000 Units by mouth.     • Pancrelipase, Lip-Prot-Amyl, (CREON) 92346 units Cap DR Particles Take 2 tabs PO w/ meals TID & 1 tab PO w/ snacks TID, 9 cap/day, 270 caps per month, & 810 caps per 90 days, please disp 90 days if possible     • Cholecalciferol (VITAMIN D PO) Take  by mouth.     • levalbuterol (XOPENEX) 1.25 MG/3ML Nebu Soln 3 mL by Nebulization route every four hours as needed for Shortness of Breath (Cough, Wheezing.). 75 mL 11   • levalbuterol (XOPENEX HFA) 45 MCG/ACT inhaler Inhale 2 Puffs by mouth every four hours as needed for Shortness of Breath. 1 Inhaler 11   • Blood Glucose Monitoring Suppl (ONE TOUCH ULTRA 2) w/Device Kit U UTD TO TEST BLOOD SUGAR  0   • glucose blood (ONE TOUCH ULTRA TEST) strip 1 Each by Other route.     • PULMOZYME 1 MG/ML Solution VVN D  1   • sodium chloride (HYPER-SAL) 7 % Nebu Soln VVN BID  1   • ONE TOUCH ULTRASOFT LANCETS Misc 1 Each by Other route.     • diazePAM (VALIUM) 2 MG Tab Take 2 mg by mouth.     • HYDROcodone-acetaminophen (NORCO) 5-325 MG Tab per tablet Take  by mouth.     • levalbuterol (XOPENEX HFA) 45 MCG/ACT inhaler 2 Puffs by Nebulization route.     • albuterol (PROVENTIL) 2.5mg/3ml Nebu Soln solution for nebulization 3 mL by Nebulization route every four hours as needed for Shortness of Breath. 75 mL 11   • diazepam (VALIUM) 2 MG Tab      • sterile water SOLN 2.1 mL with sodium chloride 4 mEq/mL SOLN 3.6 mEq 3 mL by Nebulization route 3 times a day. 90 Ampule 11   • albuterol (PROAIR HFA) 108 (90 Base) MCG/ACT Aero Soln inhalation aerosol Inhale 2 Puffs by mouth every four hours as needed for Shortness of  "Breath (wheezing). 1 Inhaler 11   • oxycodone-acetaminophen (PERCOCET) 7.5-325 MG per tablet Take 1-2 Tabs by mouth every four hours as needed for Moderate Pain. (Patient not taking: Reported on 9/15/2017) 40 Tab 0   • cephALEXin (KEFLEX) 500 MG Cap Take 1 Cap by mouth every 6 hours. (Patient not taking: Reported on 9/15/2017) 40 Cap 0   • Pancrelipase, Lip-Prot-Amyl, 66333 UNITS Cap DR Particles Take  by mouth every day.     • CHOLESTYRAMINE PO Take  by mouth every day.     • hydrocodone-acetaminophen (NORCO) 5-325 MG Tab per tablet Take 1-2 Tabs by mouth every 6 hours as needed. 30 Tab 0     No current facility-administered medications on file prior to visit.      Blood pressure 126/82, pulse 74, temperature 36.5 °C (97.7 °F), temperature source Oral, resp. rate 16, height 1.6 m (5' 3\"), weight 88.5 kg (195 lb), last menstrual period 01/04/2006, SpO2 94 %, not currently breastfeeding.  Family History   Problem Relation Age of Onset   • Cancer Mother    • Alcohol abuse Father    • Cancer Daughter        Physical Exam:  No distress at rest on room air  HEENT: PERRLA, EOMI, no scleral icterus, no nasal or oral lesions  Neck: No thyromegaly, no adenopathy, no bruits  Mallampatti: Grade II  Lungs: Equal breath sounds, no wheezes or crackles on today's evaluation  Heart: Regular rate and rhythm, no gallops or murmurs  Abdomen: Soft, benign, no organomegaly  Extremities: No clubbing, cyanosis, or edema  Neurologic: Cranial nerve, motor, and sensory exam are normal    1. Cystic fibrosis ([facility])    2. BMI 33.0-33.9,adult    3. Pancreatic insufficiency due to cystic fibrosis ([facility])    4. Bronchiectasis without complication ([facility])    5. CHARIS (mycobacterium avium-intracellulare) ([facility])        This woman was diagnosed with cystic fibrosis at age 67.  She has had a bit of a decline in her pulmonary function.  She is having difficulty adhering to a strict regimen of airway clearance.  We did discuss her disease process as mild " compared to most patients with cystic fibrosis.  However, we did urge her to utilize airway clearance procedures as much as possible.  I have asked her to return to [place] for repeat evaluation.  We will see her in several months and follow.

## 2019-05-15 ENCOUNTER — APPOINTMENT (OUTPATIENT)
Dept: PULMONOLOGY | Facility: HOSPICE | Age: 69
End: 2019-05-15
Payer: MEDICARE

## 2019-05-15 ENCOUNTER — HOSPITAL ENCOUNTER (OUTPATIENT)
Dept: RADIOLOGY | Facility: MEDICAL CENTER | Age: 69
End: 2019-05-15
Attending: INTERNAL MEDICINE
Payer: MEDICARE

## 2019-05-15 ENCOUNTER — OFFICE VISIT (OUTPATIENT)
Dept: PULMONOLOGY | Facility: HOSPICE | Age: 69
End: 2019-05-15
Payer: MEDICARE

## 2019-05-15 VITALS
HEART RATE: 73 BPM | SYSTOLIC BLOOD PRESSURE: 130 MMHG | RESPIRATION RATE: 16 BRPM | HEIGHT: 63 IN | OXYGEN SATURATION: 91 % | DIASTOLIC BLOOD PRESSURE: 80 MMHG | BODY MASS INDEX: 35.79 KG/M2 | WEIGHT: 202 LBS

## 2019-05-15 DIAGNOSIS — E84.9 CYSTIC FIBROSIS (HCC): ICD-10-CM

## 2019-05-15 DIAGNOSIS — R07.9 CHEST PAIN, UNSPECIFIED TYPE: ICD-10-CM

## 2019-05-15 PROCEDURE — 71046 X-RAY EXAM CHEST 2 VIEWS: CPT

## 2019-05-15 PROCEDURE — 99214 OFFICE O/P EST MOD 30 MIN: CPT | Performed by: INTERNAL MEDICINE

## 2019-05-15 RX ORDER — CLINDAMYCIN HYDROCHLORIDE 300 MG/1
300 CAPSULE ORAL 3 TIMES DAILY
Qty: 40 CAP | Refills: 0 | Status: SHIPPED | OUTPATIENT
Start: 2019-05-15 | End: 2019-05-22

## 2019-05-15 RX ORDER — LEVOFLOXACIN 500 MG/1
750 TABLET, FILM COATED ORAL DAILY
Qty: 11 TAB | Refills: 0 | Status: SHIPPED
Start: 2019-05-15 | End: 2020-02-24

## 2019-05-15 RX ORDER — CALCIUM CARBONATE 500 MG/1
500 TABLET, CHEWABLE ORAL DAILY
COMMUNITY
End: 2020-02-24

## 2019-05-15 ASSESSMENT — ENCOUNTER SYMPTOMS
MYALGIAS: 0
SPUTUM PRODUCTION: 0
BLURRED VISION: 0
PHOTOPHOBIA: 0
COUGH: 1
PALPITATIONS: 0
FOCAL WEAKNESS: 0
VOMITING: 0
DOUBLE VISION: 0
SORE THROAT: 0
EYE DISCHARGE: 0
NAUSEA: 0
HEMOPTYSIS: 0
PND: 0
EYE PAIN: 0
CHILLS: 1
STRIDOR: 0
SHORTNESS OF BREATH: 1
HEARTBURN: 0
TREMORS: 0
FALLS: 0
ORTHOPNEA: 0
SINUS PAIN: 0
HEADACHES: 0
CLAUDICATION: 0
DIAPHORESIS: 0
DIARRHEA: 0
BACK PAIN: 0
EYE REDNESS: 0
WHEEZING: 0
FEVER: 0
DEPRESSION: 0
SPEECH CHANGE: 0
WEIGHT LOSS: 0
DIZZINESS: 0
CONSTIPATION: 0
WEAKNESS: 0
NECK PAIN: 0
ABDOMINAL PAIN: 0

## 2019-05-15 NOTE — PATIENT INSTRUCTIONS
Start nebulized xopenex twice daily with vest therapy; can do nebulizer tx at the same time as vest treatment  Continue tobramycin and pulmozyme

## 2019-05-15 NOTE — Clinical Note
Can you fax to [place] [facility] with cover letter to SUELLEN Kimble in CF clinic? Thank you!

## 2019-05-15 NOTE — PROGRESS NOTES
Chief Complaint   Patient presents with   • Follow-Up     Sick Visit, LAst seen 03/15/19          HPI: This patient is a 68 y.o. female whom is followed in our clinic for CF last seen by DR. Vidal on 3/15/2019.  The pt has an adult-diagnosis of Cystic Fibrosis, genotype [id number]+5Tcis, positive sweat chloride test of 99 on 11/29/17 with multisystem involvement including pancreatic insufficiency. 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 Her sputum cultures in the past have demonstrated CHARIS which does not require treatment to this point.  She is also growing mucoid Pseudomonas and MSSA.    She has been seen and followed at [facility] and was last seen there on 4/4/19 where she again grew 3+ MSSA from her sputum and 4+ mucoid PsA.  She has been advised to follow a regimen of Pulmozyme, inhaled Tobramycin, Acapella therapy, Xopenex nebs as well as nebulized saline and chest vest therapy for airway clearance.  The pt has not been compliant with all therapies due to time needed and relative lack of sxs. She does work as a .  Since her last f/u with [facility] she has been compliant with pulmozyme, inhaled tobramycin and xopenex by [facility].  She presents today for acute sick visit with several days of L sided back pain which radiates around her ribs, is dull but becomes sharp with cough.  She denies sputum production which is odd for her as she can usually produce sputum and feels as though she cant.  She has mild SOB associated with this but denies fevers. She contacted [facility] yesterday and they advised her to be seen locally so she presents today.      A chest x-ray from February 2018 showed mild bilateral basilar hazy opacities but no acute infiltrate or interstitial markings.  A CT chest from September 2017 shows moderate bilateral bronchiectasis with some  evidence of mucous plugging.    Past Medical History:   Diagnosis Date   • Bronchitis 2010   • Cancer (HCC) 2015    skin ca   • Coughing blood     intermittent cough unsure if allergies   • Heart burn    • Indigestion    • Other specified disorder of intestines     diverticuli   • Other specified symptom associated with female genital organs     pelvic prolapse   • Pain     intestines   • Pneumonia 06/2015       Social History     Social History   • Marital status:      Spouse name: N/A   • Number of children: N/A   • Years of education: N/A     Occupational History   • Not on file.     Social History Main Topics   • Smoking status: Former Smoker     Packs/day: 1.00     Years: 20.00     Types: Cigarettes     Start date: 1/1/1964     Quit date: 1/1/1984   • Smokeless tobacco: Never Used   • Alcohol use Yes      Comment: 5 per week   • Drug use: No   • Sexual activity: Not on file     Other Topics Concern   • Not on file     Social History Narrative   • No narrative on file       Family History   Problem Relation Age of Onset   • Cancer Mother    • Alcohol abuse Father    • Cancer Daughter        Current Outpatient Prescriptions on File Prior to Visit   Medication Sig Dispense Refill   • alendronate (FOSAMAX) 70 MG Tab TAKE ONE TABLET BY MOUTH ONCE A WEEK     • CALCIUM CARBONATE ANTACID PO Take  by mouth.     • Calcium Carb-Cholecalciferol (OYSCO 500 + D) 500-200 MG-UNIT Tab Take 1 tablet by mouth.     • Tobramycin (DARCY PODHALER) 28 MG Cap 4 Capsule by Nebulization route.     • dornase alpha (PULMOZYME) 1 MG/ML Solution 2.51 mg by Nebulization route.     • ONE TOUCH ULTRASOFT LANCETS Misc 1 Each by Other route.     • ONE TOUCH ULTRASOFT LANCETS Misc 1 Each by Other route.     • Pancrelipase, Lip-Prot-Amyl, (CREON) 29996 units Cap DR Particles Take 2 tabs PO w/ meals TID & 1 tab PO w/ snacks TID, 9 cap/day, 270 caps per month, & 810 caps per 90 days, please disp 90 days if possible     • diazePAM (VALIUM) 2 MG  Tab Take 2 mg by mouth.     • HYDROcodone-acetaminophen (NORCO) 5-325 MG Tab per tablet Take  by mouth.     • levalbuterol (XOPENEX HFA) 45 MCG/ACT inhaler 2 Puffs by Nebulization route.     • Cholecalciferol (VITAMIN D PO) Take  by mouth.     • alendronate (FOSAMAX) 70 MG Tab      • benzonatate (TESSALON) 100 MG Cap      • CHOLECALCIFEROL PO Take 5,000 Units by mouth.     • ciprofloxacin (CIPRO) 750 MG Tab      • Ivacaftor (KALYDECO) 150 MG Tab Take 1 tablet by mouth.     • levoFLOXacin (LEVAQUIN) 750 MG tablet      • oseltamivir (TAMIFLU) 75 MG Cap      • polyethylene glycol 3350 (MIRALAX) Powder Take 34 g by mouth.     • sucralfate (CARAFATE) 1 GM Tab Take 1 g by mouth.     • Tezacaftor-Ivacaftor&Ivacaftor (SYMDEKO) 100-150 & 150 MG Tablet Therapy Pack Take 1 tablet by mouth.     • Pancrelipase, Lip-Prot-Amyl, (CREON) 35234 units Cap DR Particles TAKE 2 CAPSULES BY MOUTH WITH MEALS THREE TIMES DAILY AND 1 CAPSULE BY MOUTH WITH SNACKS THREE TIMES DAILY, 9 CAPSULES PER DAY     • glucose blood (ONE TOUCH ULTRA TEST) strip 1 Strip by Other route.     • diazePAM (VALIUM) 2 MG Tab Take 2 mg by mouth.     • HYDROcodone-acetaminophen (NORCO) 5-325 MG Tab per tablet Take 1-2 tablet by mouth.     • levalbuterol (XOPENEX) 1.25 MG/3ML Nebu Soln USE 1 VIAL VIA NEBULIZER FOUR TIMES DAILY PRN     • levalbuterol (XOPENEX HFA) 45 MCG/ACT inhaler 2 Puffs by Nebulization route.     • Blood Glucose Monitoring Suppl (ONE TOUCH ULTRA 2) w/Device Kit U UTD TO TEST BLOOD SUGAR  0   • glucose blood (ONE TOUCH ULTRA TEST) strip 1 Each by Other route.     • Multiple Vitamin (MULTI-VITAMIN DAILY PO) Take 10,000 Units by mouth.     • PULMOZYME 1 MG/ML Solution VVN D  1   • sodium chloride (HYPER-SAL) 7 % Nebu Soln VVN BID  1   • levalbuterol (XOPENEX) 1.25 MG/3ML Nebu Soln 3 mL by Nebulization route every four hours as needed for Shortness of Breath (Cough, Wheezing.). (Patient not taking: Reported on 5/15/2019) 75 mL 11   • levalbuterol  (XOPENEX HFA) 45 MCG/ACT inhaler Inhale 2 Puffs by mouth every four hours as needed for Shortness of Breath. (Patient not taking: Reported on 5/15/2019) 1 Inhaler 11   • albuterol (PROVENTIL) 2.5mg/3ml Nebu Soln solution for nebulization 3 mL by Nebulization route every four hours as needed for Shortness of Breath. 75 mL 11   • diazepam (VALIUM) 2 MG Tab      • sterile water SOLN 2.1 mL with sodium chloride 4 mEq/mL SOLN 3.6 mEq 3 mL by Nebulization route 3 times a day. (Patient not taking: Reported on 5/15/2019) 90 Ampule 11   • albuterol (PROAIR HFA) 108 (90 Base) MCG/ACT Aero Soln inhalation aerosol Inhale 2 Puffs by mouth every four hours as needed for Shortness of Breath (wheezing). 1 Inhaler 11   • oxycodone-acetaminophen (PERCOCET) 7.5-325 MG per tablet Take 1-2 Tabs by mouth every four hours as needed for Moderate Pain. (Patient not taking: Reported on 9/15/2017) 40 Tab 0   • cephALEXin (KEFLEX) 500 MG Cap Take 1 Cap by mouth every 6 hours. (Patient not taking: Reported on 9/15/2017) 40 Cap 0   • Pancrelipase, Lip-Prot-Amyl, 13286 UNITS Cap DR Particles Take  by mouth every day.     • CHOLESTYRAMINE PO Take  by mouth every day.     • hydrocodone-acetaminophen (NORCO) 5-325 MG Tab per tablet Take 1-2 Tabs by mouth every 6 hours as needed. (Patient not taking: Reported on 5/15/2019) 30 Tab 0     No current facility-administered medications on file prior to visit.        Albuterol; Ambien [zolpidem]; Bee venom; Flagyl [metronidazole]; Sulfa drugs; and Tape      ROS:   Review of Systems   Constitutional: Positive for chills. Negative for diaphoresis, fever, malaise/fatigue and weight loss.   HENT: Negative for congestion, ear discharge, ear pain, hearing loss, nosebleeds, sinus pain, sore throat and tinnitus.    Eyes: Negative for blurred vision, double vision, photophobia, pain, discharge and redness.   Respiratory: Positive for cough and shortness of breath. Negative for hemoptysis, sputum production, wheezing  "and stridor.    Cardiovascular: Positive for chest pain. Negative for palpitations, orthopnea, claudication, leg swelling and PND.   Gastrointestinal: Negative for abdominal pain, constipation, diarrhea, heartburn, nausea and vomiting.   Genitourinary: Negative for dysuria and urgency.   Musculoskeletal: Negative for back pain, falls, joint pain, myalgias and neck pain.   Skin: Negative for itching and rash.   Neurological: Negative for dizziness, tremors, speech change, focal weakness, weakness and headaches.   Endo/Heme/Allergies: Negative for environmental allergies.   Psychiatric/Behavioral: Negative for depression.       /80 (BP Location: Left arm, Patient Position: Sitting, BP Cuff Size: Adult)   Pulse 73   Resp 16   Ht 1.6 m (5' 3\")   Wt 91.6 kg (202 lb)   SpO2 91%   Physical Exam   Constitutional: She is oriented to person, place, and time. She appears well-developed and well-nourished.   HENT:   Head: Normocephalic and atraumatic.   Left Ear: External ear normal.   Mouth/Throat: Oropharynx is clear and moist. No oropharyngeal exudate.   Eyes: Pupils are equal, round, and reactive to light. Conjunctivae and EOM are normal. No scleral icterus.   Neck: Neck supple. No JVD present. No tracheal deviation present.   Cardiovascular: Normal rate, regular rhythm and normal heart sounds.  Exam reveals no gallop and no friction rub.    No murmur heard.  Pulmonary/Chest: Effort normal. No accessory muscle usage. No respiratory distress. She has no wheezes. She has no rales.   Coarse inspiratory bs b/l, no wheezes   Abdominal: Soft. She exhibits no distension. There is no tenderness.   Musculoskeletal: Normal range of motion. She exhibits no edema, tenderness or deformity.   Lymphadenopathy:     She has no cervical adenopathy.   Neurological: She is alert and oriented to person, place, and time. No cranial nerve deficit. Gait normal.   Skin: Skin is warm and dry. No rash noted. No cyanosis. Nails show no " clubbing.   Psychiatric: She has a normal mood and affect.       Imagaing as reviewed by me personally:  CXR from 2/218 and CT chest reviewed from 9/2017 as per above/HPI    Assessment:  1. Cystic fibrosis (HCC)  clindamycin (CLEOCIN) 300 MG Cap    levoFLOXacin (LEVAQUIN) 500 MG tablet    DX-CHEST-2 VIEWS   2. Chest pain, unspecified type         Plan:  1.  Am concerned that the patient is having acute exacerbation versus acute mucous plugging.  Her oxygen is low normal today and she does not have fever so I believe the exacerbation is mild at this point.  I counseled the patient on the importance of escalating her airway clearance to include chest vest therapy at a minimum of once daily during which she can do a Xopenex nebulized treatment to cut down on time required.  Ideally she would do this at least twice daily particularly in the acute setting.  Additionally I am going to start her on oral clindamycin as well as oral levofloxacin at respiratory dose for a period of 7 days based on her respiratory cultures.  Ideally we would double cover for Pseudomonas however this cannot be done effectively orally and her exacerbation is mild.  I have CCing this note to her providers at [facility] to ensure that they agree with the plan.  I have also ordered a chest x-ray and scheduled the patient to see me back next week to ensure symptoms are improving.  2.  I suspect this is related to exacerbation of her cystic fibrosis with mucous plugging and possible infection.  As per above I counseled her on the importance of increasing her airway clearance mechanisms including changing from metered-dose inhaler to nebulized Xopenex to help warm and humidify her airways and doing this in conjunction with chest vest therapy.  We will start antibiotics as per above unless providers from [facility] advise otherwise.  We will obtain chest x-ray and see her back within 1 week.  Return in about 1 week (around 5/22/2019).

## 2019-05-17 ENCOUNTER — TELEPHONE (OUTPATIENT)
Dept: PULMONOLOGY | Facility: HOSPICE | Age: 69
End: 2019-05-17

## 2019-05-17 NOTE — TELEPHONE ENCOUNTER
Patient called requesting her results to the DX-CHEST-2 VIEWS [[id number]] she had done 05/15/2019, I did let her know that she has an appointment on 05/23/2019 with Dr. Keen to review but patient would like a call before the weekend

## 2019-05-23 ENCOUNTER — OFFICE VISIT (OUTPATIENT)
Dept: PULMONOLOGY | Facility: HOSPICE | Age: 69
End: 2019-05-23
Payer: MEDICARE

## 2019-05-23 VITALS
BODY MASS INDEX: 35.44 KG/M2 | RESPIRATION RATE: 16 BRPM | HEART RATE: 82 BPM | OXYGEN SATURATION: 92 % | DIASTOLIC BLOOD PRESSURE: 82 MMHG | SYSTOLIC BLOOD PRESSURE: 124 MMHG | HEIGHT: 63 IN | WEIGHT: 200 LBS | TEMPERATURE: 97.7 F

## 2019-05-23 DIAGNOSIS — J47.9 BRONCHIECTASIS WITHOUT COMPLICATION (HCC): ICD-10-CM

## 2019-05-23 DIAGNOSIS — M81.0 OSTEOPOROSIS, UNSPECIFIED OSTEOPOROSIS TYPE, UNSPECIFIED PATHOLOGICAL FRACTURE PRESENCE: ICD-10-CM

## 2019-05-23 DIAGNOSIS — M54.6 ACUTE MIDLINE THORACIC BACK PAIN: ICD-10-CM

## 2019-05-23 PROCEDURE — 99214 OFFICE O/P EST MOD 30 MIN: CPT | Performed by: INTERNAL MEDICINE

## 2019-05-23 ASSESSMENT — ENCOUNTER SYMPTOMS
DIAPHORESIS: 0
ABDOMINAL PAIN: 0
CONSTIPATION: 0
BACK PAIN: 1
SINUS PAIN: 0
DIZZINESS: 0
MYALGIAS: 0
FOCAL WEAKNESS: 0
DOUBLE VISION: 0
HEMOPTYSIS: 0
WEIGHT LOSS: 0
PALPITATIONS: 0
DEPRESSION: 0
NAUSEA: 0
EYE PAIN: 0
SPEECH CHANGE: 0
STRIDOR: 0
CHILLS: 0
HEARTBURN: 0
PND: 0
NECK PAIN: 0
WEAKNESS: 0
PHOTOPHOBIA: 0
HEADACHES: 0
EYE DISCHARGE: 0
VOMITING: 0
FALLS: 0
WHEEZING: 0
SHORTNESS OF BREATH: 1
EYE REDNESS: 0
SORE THROAT: 0
TREMORS: 0
COUGH: 1
BLURRED VISION: 0
DIARRHEA: 0
CLAUDICATION: 0
FEVER: 0
ORTHOPNEA: 0
SPUTUM PRODUCTION: 0

## 2019-05-23 NOTE — PATIENT INSTRUCTIONS
Alternate hydrocodone with ibuprofen 200-600 mg (1-3 over the counter tablets) every 4-6 hours as needed for pain  Try to take ibuprofen with food  Call if back pain worsening

## 2019-05-23 NOTE — PROGRESS NOTES
Chief Complaint   Patient presents with   • Follow-Up     last seen 5/15/19    • Results     CXR 5/15/19          HPI: This patient is a 68 y.o. female whom is followed in our clinic for CF last seen by me on 5/15/19.  She is normally followed by Dr. Vidal and has an adult-diagnosis of Cystic Fibrosis, genotype [id number]+5Tcis, positive sweat chloride test of 99 on 11/29/17 with multisystem involvement including pancreatic insufficiency. 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 Her sputum cultures in the past have demonstrated CHARIS which does not require treatment to this point.  She is also growing mucoid Pseudomonas and MSSA.  She is also followed at the [facility]; last seen 4/4/19.  She presented last week for acute sick visit with several days of left sided back pain with radiation around her ribs, described as both sharp and dull.  She was also having difficulty coughing up sputum which is rare for her and experiencing some mild shortness of breath.  We obtained a chest x-ray that showed chronic bibasilar opacities consistent with known bronchiectasis but no new infiltrate.  I started her empirically on levofloxacin and clindamycin to cover for her known MSSA and Pseudomonas.  She returns today for follow-up and reevaluation.  She has been compliant with her airway clearance regimen which I advised her to increase from metered-dose inhaler to nebulized hypertonic saline and chest therapy.  Her cough has become productive and her breathing is improved however she continues to have localized left-sided mid back pain that is now sharp.  It is worse with movement and occasionally with cough depending on her position.  She does have tenderness to palpation in the area.  She is completing the antibiotic therapy prescribed her last week, denies nausea vomiting, fevers or  chills.  She did have a bone DEXA study done this year at [facility] which showed osteoporosis of both right hip and spine.    Past Medical History:   Diagnosis Date   • Bronchitis 2010   • Cancer (HCC) 2015    skin ca   • Coughing blood     intermittent cough unsure if allergies   • Heart burn    • Indigestion    • Other specified disorder of intestines     diverticuli   • Other specified symptom associated with female genital organs     pelvic prolapse   • Pain     intestines   • Pneumonia 06/2015       Social History     Social History   • Marital status:      Spouse name: N/A   • Number of children: N/A   • Years of education: N/A     Occupational History   • Not on file.     Social History Main Topics   • Smoking status: Former Smoker     Packs/day: 1.00     Years: 20.00     Types: Cigarettes     Start date: 1/1/1964     Quit date: 1/1/1984   • Smokeless tobacco: Never Used   • Alcohol use 3.0 oz/week     5 Glasses of wine per week      Comment: 5 per week   • Drug use: No   • Sexual activity: Not on file     Other Topics Concern   • Not on file     Social History Narrative   • No narrative on file       Family History   Problem Relation Age of Onset   • Cancer Mother    • Alcohol abuse Father    • Cancer Daughter        Current Outpatient Prescriptions on File Prior to Visit   Medication Sig Dispense Refill   • Omeprazole Magnesium (PRILOSEC OTC PO) Take  by mouth.     • levoFLOXacin (LEVAQUIN) 500 MG tablet Take 1.5 Tabs by mouth every day. 11 Tab 0   • alendronate (FOSAMAX) 70 MG Tab TAKE ONE TABLET BY MOUTH ONCE A WEEK     • CALCIUM CARBONATE ANTACID PO Take  by mouth.     • Calcium Carb-Cholecalciferol (OYSCO 500 + D) 500-200 MG-UNIT Tab Take 1 tablet by mouth.     • CHOLECALCIFEROL PO Take 5,000 Units by mouth.     • Tobramycin (DARCY PODHALER) 28 MG Cap 4 Capsule by Nebulization route.     • glucose blood (ONE TOUCH ULTRA TEST) strip 1 Strip by Other route.     • dornase alpha (PULMOZYME) 1 MG/ML  Solution 2.51 mg by Nebulization route.     • ONE TOUCH ULTRASOFT LANCETS Misc 1 Each by Other route.     • Blood Glucose Monitoring Suppl (ONE TOUCH ULTRA 2) w/Device Kit U UTD TO TEST BLOOD SUGAR  0   • glucose blood (ONE TOUCH ULTRA TEST) strip 1 Each by Other route.     • ONE TOUCH ULTRASOFT LANCETS Misc 1 Each by Other route.     • Pancrelipase, Lip-Prot-Amyl, (CREON) 34366 units Cap DR Particles Take 2 tabs PO w/ meals TID & 1 tab PO w/ snacks TID, 9 cap/day, 270 caps per month, & 810 caps per 90 days, please disp 90 days if possible     • Cholecalciferol (VITAMIN D PO) Take  by mouth.     • calcium carbonate (TUMS) 500 MG Chew Tab Take 500 mg by mouth every day.     • alendronate (FOSAMAX) 70 MG Tab      • benzonatate (TESSALON) 100 MG Cap      • ciprofloxacin (CIPRO) 750 MG Tab      • Ivacaftor (KALYDECO) 150 MG Tab Take 1 tablet by mouth.     • levoFLOXacin (LEVAQUIN) 750 MG tablet      • oseltamivir (TAMIFLU) 75 MG Cap      • polyethylene glycol 3350 (MIRALAX) Powder Take 34 g by mouth.     • sucralfate (CARAFATE) 1 GM Tab Take 1 g by mouth.     • Tezacaftor-Ivacaftor&Ivacaftor (SYMDEKO) 100-150 & 150 MG Tablet Therapy Pack Take 1 tablet by mouth.     • Pancrelipase, Lip-Prot-Amyl, (CREON) 63245 units Cap DR Particles TAKE 2 CAPSULES BY MOUTH WITH MEALS THREE TIMES DAILY AND 1 CAPSULE BY MOUTH WITH SNACKS THREE TIMES DAILY, 9 CAPSULES PER DAY     • diazePAM (VALIUM) 2 MG Tab Take 2 mg by mouth.     • HYDROcodone-acetaminophen (NORCO) 5-325 MG Tab per tablet Take 1-2 tablet by mouth.     • levalbuterol (XOPENEX) 1.25 MG/3ML Nebu Soln USE 1 VIAL VIA NEBULIZER FOUR TIMES DAILY PRN     • levalbuterol (XOPENEX HFA) 45 MCG/ACT inhaler 2 Puffs by Nebulization route.     • Multiple Vitamin (MULTI-VITAMIN DAILY PO) Take 10,000 Units by mouth.     • PULMOZYME 1 MG/ML Solution VVN D  1   • sodium chloride (HYPER-SAL) 7 % Nebu Soln VVN BID  1   • diazePAM (VALIUM) 2 MG Tab Take 2 mg by mouth.     •  HYDROcodone-acetaminophen (NORCO) 5-325 MG Tab per tablet Take  by mouth.     • levalbuterol (XOPENEX HFA) 45 MCG/ACT inhaler 2 Puffs by Nebulization route.     • levalbuterol (XOPENEX) 1.25 MG/3ML Nebu Soln 3 mL by Nebulization route every four hours as needed for Shortness of Breath (Cough, Wheezing.). (Patient not taking: Reported on 5/15/2019) 75 mL 11   • levalbuterol (XOPENEX HFA) 45 MCG/ACT inhaler Inhale 2 Puffs by mouth every four hours as needed for Shortness of Breath. (Patient not taking: Reported on 5/15/2019) 1 Inhaler 11   • albuterol (PROVENTIL) 2.5mg/3ml Nebu Soln solution for nebulization 3 mL by Nebulization route every four hours as needed for Shortness of Breath. (Patient not taking: Reported on 5/23/2019) 75 mL 11   • diazepam (VALIUM) 2 MG Tab      • sterile water SOLN 2.1 mL with sodium chloride 4 mEq/mL SOLN 3.6 mEq 3 mL by Nebulization route 3 times a day. (Patient not taking: Reported on 5/15/2019) 90 Ampule 11   • albuterol (PROAIR HFA) 108 (90 Base) MCG/ACT Aero Soln inhalation aerosol Inhale 2 Puffs by mouth every four hours as needed for Shortness of Breath (wheezing). (Patient not taking: Reported on 5/23/2019) 1 Inhaler 11   • oxycodone-acetaminophen (PERCOCET) 7.5-325 MG per tablet Take 1-2 Tabs by mouth every four hours as needed for Moderate Pain. (Patient not taking: Reported on 9/15/2017) 40 Tab 0   • cephALEXin (KEFLEX) 500 MG Cap Take 1 Cap by mouth every 6 hours. (Patient not taking: Reported on 9/15/2017) 40 Cap 0   • Pancrelipase, Lip-Prot-Amyl, 35620 UNITS Cap DR Particles Take  by mouth every day.     • CHOLESTYRAMINE PO Take  by mouth every day.     • hydrocodone-acetaminophen (NORCO) 5-325 MG Tab per tablet Take 1-2 Tabs by mouth every 6 hours as needed. 30 Tab 0     No current facility-administered medications on file prior to visit.        Albuterol; Ambien [zolpidem]; Bee venom; Flagyl [metronidazole]; Sulfa drugs; and Tape      ROS:   Review of Systems  "  Constitutional: Negative for chills, diaphoresis, fever, malaise/fatigue and weight loss.   HENT: Negative for congestion, ear discharge, ear pain, hearing loss, nosebleeds, sinus pain, sore throat and tinnitus.    Eyes: Negative for blurred vision, double vision, photophobia, pain, discharge and redness.   Respiratory: Positive for cough and shortness of breath. Negative for hemoptysis, sputum production, wheezing and stridor.    Cardiovascular: Negative for chest pain, palpitations, orthopnea, claudication, leg swelling and PND.   Gastrointestinal: Negative for abdominal pain, constipation, diarrhea, heartburn, nausea and vomiting.   Genitourinary: Negative for dysuria and urgency.   Musculoskeletal: Positive for back pain. Negative for falls, joint pain, myalgias and neck pain.   Skin: Negative for itching and rash.   Neurological: Negative for dizziness, tremors, speech change, focal weakness, weakness and headaches.   Endo/Heme/Allergies: Negative for environmental allergies.   Psychiatric/Behavioral: Negative for depression.       /82 (BP Location: Left arm, Patient Position: Sitting, BP Cuff Size: Large adult)   Pulse 82   Temp 36.5 °C (97.7 °F) (Temporal)   Resp 16   Ht 1.6 m (5' 3\")   Wt 90.7 kg (200 lb)   SpO2 92%   Physical Exam  Constitutional: She is oriented to person, place, and time. She appears well-developed and well-nourished.   HENT:   Head: Normocephalic and atraumatic.   Left Ear: External ear normal.   Mouth/Throat: Oropharynx is clear and moist. No oropharyngeal exudate.   Eyes: Pupils are equal, round, and reactive to light. Conjunctivae and EOM are normal. No scleral icterus.   Neck: Neck supple. No JVD present. No tracheal deviation present.   Cardiovascular: Normal rate, regular rhythm and normal heart sounds.  Exam reveals no gallop and no friction rub.    No murmur heard.  Pulmonary/Chest: Effort normal. No accessory muscle usage. No respiratory distress. She has no " wheezes. She has no rales.   Coarse inspiratory bs b/l, no wheezes   Abdominal: Soft. She exhibits no distension. There is no tenderness.   Musculoskeletal: Normal range of motion. She exhibits no edema, + ttp t-spine just left to center w/no palpable deformity   Lymphadenopathy:     She has no cervical adenopathy.   Neurological: She is alert and oriented to person, place, and time. No cranial nerve deficit. Gait normal.   Skin: Skin is warm and dry. No rash noted. No cyanosis. Nails show no clubbing.   Psychiatric: She has a normal mood and affect.     Imagaing as reviewed by me personally:  cxr as per HPI    Assessment:  1. Acute midline thoracic back pain  CT-TSPINE W/O PLUS RECONS   2. Bronchiectasis without complication (HCC)     3. Osteoporosis, unspecified osteoporosis type, unspecified pathological fracture presence         Plan:  1.  I am concerned now that this may be more musculoskeletal than related to issues going on in her lungs.  We will continue to treat empirically for bronchiectasis exacerbation by completing her antibiotics and continue airway clearance regimen however we will obtain a CT of her thoracic spine to evaluate for a traumatic compression fracture or rib fracture which may have been induced with coughing.  Overall the treatment for this would be conservative however would help to have the diagnosis.  2.  As per above patient has upped her airway clearance regimen with increased mucus production which she was not able to produce at our last appointment.  I again explained the importance of maintaining this to prevent ongoing mucus plugging and recurrent infection.  We will evaluate her back pain as per above and complete her antibiotic therapy.  We will see her back in the next 2 months pending imaging of her spine.  She sees [facility] in July.  3.  As per above this puts her at increased risk for atraumatic fractures.  Her pain is localized.  I have ordered a CT of the thoracic spine  to further evaluate.  She is on both calcium and vitamin D as well as alendronate.  Return in about 2 months (around 7/23/2019).

## 2019-05-30 ENCOUNTER — HOSPITAL ENCOUNTER (OUTPATIENT)
Dept: RADIOLOGY | Facility: MEDICAL CENTER | Age: 69
End: 2019-05-30
Attending: INTERNAL MEDICINE
Payer: MEDICARE

## 2019-05-30 ENCOUNTER — TELEPHONE (OUTPATIENT)
Dept: PULMONOLOGY | Facility: HOSPICE | Age: 69
End: 2019-05-30

## 2019-05-30 DIAGNOSIS — M54.6 ACUTE MIDLINE THORACIC BACK PAIN: ICD-10-CM

## 2019-05-30 PROCEDURE — 72128 CT CHEST SPINE W/O DYE: CPT

## 2019-05-30 NOTE — TELEPHONE ENCOUNTER
The patient called to say that she had her CT-Spine today and is wanting the results.  I don't see that the results are in the system yet, though.  Her number is [phone]

## 2019-05-30 NOTE — TELEPHONE ENCOUNTER
Julius Webb, Can you please tell the patient there is no acute fracture of the spine.  There are some chronic, stable degenerative changes present since 2017 but nothing new or acute.  I am happy to discuss further in person especially if she is still having back pain.

## 2019-05-31 ENCOUNTER — TELEPHONE (OUTPATIENT)
Dept: PULMONOLOGY | Facility: HOSPICE | Age: 69
End: 2019-05-31

## 2019-05-31 NOTE — TELEPHONE ENCOUNTER
Patient called and LVM asking for Dr. Keen only to call her back regarding the amount of medication she is taking.   She is requesting a call back from you.

## 2019-06-03 ENCOUNTER — OFFICE VISIT (OUTPATIENT)
Dept: PULMONOLOGY | Facility: HOSPICE | Age: 69
End: 2019-06-03
Payer: MEDICARE

## 2019-06-03 VITALS
TEMPERATURE: 97.7 F | DIASTOLIC BLOOD PRESSURE: 90 MMHG | SYSTOLIC BLOOD PRESSURE: 132 MMHG | OXYGEN SATURATION: 91 % | HEART RATE: 73 BPM | HEIGHT: 63 IN | WEIGHT: 200 LBS | BODY MASS INDEX: 35.44 KG/M2 | RESPIRATION RATE: 16 BRPM

## 2019-06-03 DIAGNOSIS — J47.9 BRONCHIECTASIS WITHOUT COMPLICATION (HCC): ICD-10-CM

## 2019-06-03 DIAGNOSIS — M81.0 OSTEOPOROSIS, UNSPECIFIED OSTEOPOROSIS TYPE, UNSPECIFIED PATHOLOGICAL FRACTURE PRESENCE: ICD-10-CM

## 2019-06-03 DIAGNOSIS — E84.9 CYSTIC FIBROSIS (HCC): ICD-10-CM

## 2019-06-03 DIAGNOSIS — M54.6 ACUTE MIDLINE THORACIC BACK PAIN: ICD-10-CM

## 2019-06-03 PROCEDURE — 99213 OFFICE O/P EST LOW 20 MIN: CPT | Performed by: INTERNAL MEDICINE

## 2019-06-03 ASSESSMENT — ENCOUNTER SYMPTOMS
NECK PAIN: 0
SPUTUM PRODUCTION: 0
FEVER: 0
BLURRED VISION: 0
DEPRESSION: 0
HEMOPTYSIS: 0
NAUSEA: 0
ABDOMINAL PAIN: 0
CHILLS: 0
FALLS: 0
ORTHOPNEA: 0
EYE DISCHARGE: 0
FOCAL WEAKNESS: 0
DIZZINESS: 0
WEAKNESS: 0
TREMORS: 0
PND: 0
VOMITING: 0
WEIGHT LOSS: 0
EYE REDNESS: 0
WHEEZING: 0
DOUBLE VISION: 0
DIARRHEA: 0
SPEECH CHANGE: 0
BACK PAIN: 1
PHOTOPHOBIA: 0
DIAPHORESIS: 0
EYE PAIN: 0
MYALGIAS: 0
SHORTNESS OF BREATH: 0
HEARTBURN: 0
HEADACHES: 0
SINUS PAIN: 0
STRIDOR: 0
COUGH: 1
CONSTIPATION: 0
PALPITATIONS: 0
CLAUDICATION: 0
SORE THROAT: 0

## 2019-06-03 NOTE — PROGRESS NOTES
Chief Complaint   Patient presents with   • Follow-Up     cystic fibrosis last seen 5/23/19   • Results     CT-TSPINE W/O PLUS RECONS 5/30/19   • Dizziness     possiably due to medication, 5-6 hours ago,          HPI: This patient is a 68 y.o. female whom is followed in our clinic for CF last seen by me on 5/23/19. She is normally followed by Dr. Vidal and has an adult-diagnosis of Cystic Fibrosis, genotype [id number]/R117H+5Tcis, positive sweat chloride test of 99 on 11/29/17 with multisystem involvement including pancreatic insufficiency. 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 Her sputum cultures in the past have demonstrated CHARIS which does not require treatment to this point.  She is also growing mucoid Pseudomonas and MSSA.  She is also followed at the [facility]; last seen 4/4/19.    Most recently the patient was seen by me on May 15 for acute sick visit with several days of left-sided back pain that radiated around her ribs in addition to difficulty expectorating sputum.  A chest x-ray showed no acute changes in her chronic bibasilar opacities with known bronchiectasis.  She was started empirically on levofloxacin and clindamycin to cover for her known MSSA and Pseudomonas.  Follow-up 1 week later on May 23 her cough and his sputum clearance had improved however she continued to have back pain which on exam was localized and tender to palpation.  A CT spine was ordered to evaluate for fracture given her history of osteoporosis.  This showed only some chronic changes of her T4 but no acute fracture.  The lung parenchyma viewed by me around that area also showed no acute infiltrate.  The patient was treated with ibuprofen and more recently given baclofen by her primary care provider.  She continues to have back pain but it is improved with the muscle relaxant and  anti-inflammatories.  She denies shortness of breath, fevers, chills.  Her cough is at baseline.  No chest pain.    Past Medical History:   Diagnosis Date   • Bronchitis 2010   • Cancer (HCC) 2015    skin ca   • Coughing blood     intermittent cough unsure if allergies   • Heart burn    • Indigestion    • Other specified disorder of intestines     diverticuli   • Other specified symptom associated with female genital organs     pelvic prolapse   • Pain     intestines   • Pneumonia 06/2015       Social History     Social History   • Marital status:      Spouse name: N/A   • Number of children: N/A   • Years of education: N/A     Occupational History   • Not on file.     Social History Main Topics   • Smoking status: Former Smoker     Packs/day: 1.00     Years: 20.00     Types: Cigarettes     Start date: 1/1/1964     Quit date: 1/1/1984   • Smokeless tobacco: Never Used   • Alcohol use 3.0 oz/week     5 Glasses of wine per week      Comment: 5 per week   • Drug use: No   • Sexual activity: Not on file     Other Topics Concern   • Not on file     Social History Narrative   • No narrative on file       Family History   Problem Relation Age of Onset   • Cancer Mother    • Alcohol abuse Father    • Cancer Daughter        Current Outpatient Prescriptions on File Prior to Visit   Medication Sig Dispense Refill   • Omeprazole Magnesium (PRILOSEC OTC PO) Take  by mouth.     • calcium carbonate (TUMS) 500 MG Chew Tab Take 500 mg by mouth every day.     • alendronate (FOSAMAX) 70 MG Tab TAKE ONE TABLET BY MOUTH ONCE A WEEK     • CALCIUM CARBONATE ANTACID PO Take  by mouth.     • Calcium Carb-Cholecalciferol (OYSCO 500 + D) 500-200 MG-UNIT Tab Take 1 tablet by mouth.     • CHOLECALCIFEROL PO Take 5,000 Units by mouth.     • Ivacaftor (KALYDECO) 150 MG Tab Take 1 tablet by mouth.     • polyethylene glycol 3350 (MIRALAX) Powder Take 34 g by mouth.     • sucralfate (CARAFATE) 1 GM Tab Take 1 g by mouth.     •  Tezacaftor-Ivacaftor&Ivacaftor (SYMDEKO) 100-150 & 150 MG Tablet Therapy Pack Take 1 tablet by mouth.     • glucose blood (ONE TOUCH ULTRA TEST) strip 1 Strip by Other route.     • dornase alpha (PULMOZYME) 1 MG/ML Solution 2.51 mg by Nebulization route.     • ONE TOUCH ULTRASOFT LANCETS Misc 1 Each by Other route.     • Blood Glucose Monitoring Suppl (ONE TOUCH ULTRA 2) w/Device Kit U UTD TO TEST BLOOD SUGAR  0   • glucose blood (ONE TOUCH ULTRA TEST) strip 1 Each by Other route.     • Multiple Vitamin (MULTI-VITAMIN DAILY PO) Take 10,000 Units by mouth.     • sodium chloride (HYPER-SAL) 7 % Nebu Soln VVN BID  1   • ONE TOUCH ULTRASOFT LANCETS Misc 1 Each by Other route.     • Pancrelipase, Lip-Prot-Amyl, (CREON) 83536 units Cap DR Particles Take 2 tabs PO w/ meals TID & 1 tab PO w/ snacks TID, 9 cap/day, 270 caps per month, & 810 caps per 90 days, please disp 90 days if possible     • Cholecalciferol (VITAMIN D PO) Take  by mouth.     • sterile water SOLN 2.1 mL with sodium chloride 4 mEq/mL SOLN 3.6 mEq 3 mL by Nebulization route 3 times a day. 90 Ampule 11   • CHOLESTYRAMINE PO Take  by mouth every day.     • hydrocodone-acetaminophen (NORCO) 5-325 MG Tab per tablet Take 1-2 Tabs by mouth every 6 hours as needed. 30 Tab 0   • levoFLOXacin (LEVAQUIN) 500 MG tablet Take 1.5 Tabs by mouth every day. (Patient not taking: Reported on 6/3/2019) 11 Tab 0   • alendronate (FOSAMAX) 70 MG Tab      • benzonatate (TESSALON) 100 MG Cap      • ciprofloxacin (CIPRO) 750 MG Tab      • levoFLOXacin (LEVAQUIN) 750 MG tablet      • oseltamivir (TAMIFLU) 75 MG Cap      • Tobramycin (DARCY PODHALER) 28 MG Cap 4 Capsule by Nebulization route.     • Pancrelipase, Lip-Prot-Amyl, (CREON) 63902 units Cap DR Particles TAKE 2 CAPSULES BY MOUTH WITH MEALS THREE TIMES DAILY AND 1 CAPSULE BY MOUTH WITH SNACKS THREE TIMES DAILY, 9 CAPSULES PER DAY     • diazePAM (VALIUM) 2 MG Tab Take 2 mg by mouth.     • HYDROcodone-acetaminophen (NORCO) 5-325  MG Tab per tablet Take 1-2 tablet by mouth.     • levalbuterol (XOPENEX) 1.25 MG/3ML Nebu Soln USE 1 VIAL VIA NEBULIZER FOUR TIMES DAILY PRN     • levalbuterol (XOPENEX HFA) 45 MCG/ACT inhaler 2 Puffs by Nebulization route.     • PULMOZYME 1 MG/ML Solution VVN D  1   • diazePAM (VALIUM) 2 MG Tab Take 2 mg by mouth.     • HYDROcodone-acetaminophen (NORCO) 5-325 MG Tab per tablet Take  by mouth.     • levalbuterol (XOPENEX HFA) 45 MCG/ACT inhaler 2 Puffs by Nebulization route.     • levalbuterol (XOPENEX) 1.25 MG/3ML Nebu Soln 3 mL by Nebulization route every four hours as needed for Shortness of Breath (Cough, Wheezing.). (Patient not taking: Reported on 5/15/2019) 75 mL 11   • levalbuterol (XOPENEX HFA) 45 MCG/ACT inhaler Inhale 2 Puffs by mouth every four hours as needed for Shortness of Breath. (Patient not taking: Reported on 5/15/2019) 1 Inhaler 11   • albuterol (PROVENTIL) 2.5mg/3ml Nebu Soln solution for nebulization 3 mL by Nebulization route every four hours as needed for Shortness of Breath. (Patient not taking: Reported on 5/23/2019) 75 mL 11   • diazepam (VALIUM) 2 MG Tab      • albuterol (PROAIR HFA) 108 (90 Base) MCG/ACT Aero Soln inhalation aerosol Inhale 2 Puffs by mouth every four hours as needed for Shortness of Breath (wheezing). (Patient not taking: Reported on 5/23/2019) 1 Inhaler 11   • oxycodone-acetaminophen (PERCOCET) 7.5-325 MG per tablet Take 1-2 Tabs by mouth every four hours as needed for Moderate Pain. (Patient not taking: Reported on 9/15/2017) 40 Tab 0   • cephALEXin (KEFLEX) 500 MG Cap Take 1 Cap by mouth every 6 hours. (Patient not taking: Reported on 6/3/2019) 40 Cap 0   • Pancrelipase, Lip-Prot-Amyl, 44376 UNITS Cap DR Particles Take  by mouth every day.       No current facility-administered medications on file prior to visit.        Albuterol; Ambien [zolpidem]; Bee venom; Flagyl [metronidazole]; Sulfa drugs; and Tape      ROS:   Review of Systems   Constitutional: Positive for  "malaise/fatigue. Negative for chills, diaphoresis, fever and weight loss.   HENT: Negative for congestion, ear discharge, ear pain, hearing loss, nosebleeds, sinus pain, sore throat and tinnitus.    Eyes: Negative for blurred vision, double vision, photophobia, pain, discharge and redness.   Respiratory: Positive for cough. Negative for hemoptysis, sputum production, shortness of breath, wheezing and stridor.    Cardiovascular: Negative for chest pain, palpitations, orthopnea, claudication, leg swelling and PND.   Gastrointestinal: Negative for abdominal pain, constipation, diarrhea, heartburn, nausea and vomiting.   Genitourinary: Negative for dysuria and urgency.   Musculoskeletal: Positive for back pain. Negative for falls, joint pain, myalgias and neck pain.   Skin: Negative for itching and rash.   Neurological: Negative for dizziness, tremors, speech change, focal weakness, weakness and headaches.   Endo/Heme/Allergies: Negative for environmental allergies.   Psychiatric/Behavioral: Negative for depression.       /90 (BP Location: Left arm, Patient Position: Sitting, BP Cuff Size: Large adult)   Pulse 73   Temp 36.5 °C (97.7 °F) (Temporal)   Resp 16   Ht 1.6 m (5' 3\")   Wt 90.7 kg (200 lb)   SpO2 91%   Physical Exam  Constitutional: She is oriented to person, place, and time. She appears well-developed and well-nourished.   HENT:   Head: Normocephalic and atraumatic.   Left Ear: External ear normal.   Mouth/Throat: Oropharynx is clear and moist. No oropharyngeal exudate.   Eyes: Pupils are equal, round, and reactive to light. Conjunctivae and EOM are normal. No scleral icterus.   Neck: Neck supple. No JVD present. No tracheal deviation present.   Cardiovascular: Normal rate, regular rhythm and normal heart sounds.  Exam reveals no gallop and no friction rub.    No murmur heard.  Pulmonary/Chest: Effort normal. No accessory muscle usage. No respiratory distress. She has no wheezes. She has no rales. "   Few inspiratory squeaks b/l at bases; good air movement  Abdominal: Soft. She exhibits no distension. There is no tenderness.   Musculoskeletal: Normal range of motion. She exhibits no edema, + ttp t-spine just left to center w/no palpable deformity   Lymphadenopathy:     She has no cervical adenopathy.   Neurological: She is alert and oriented to person, place, and time. No cranial nerve deficit. Gait normal.   Skin: Skin is warm and dry. No rash noted. No cyanosis. Nails show no clubbing.   Psychiatric: She has a normal mood and affect.       Imagaing as reviewed by me personally:  As per HPI    Assessment:  1. Bronchiectasis without complication (HCC)     2. Osteoporosis, unspecified osteoporosis type, unspecified pathological fracture presence     3. Cystic fibrosis (HCC)     4. Acute midline thoracic back pain         Plan:  1.  Status post treatment for acute exacerbation.  Likely that her pain was not secondary to acute exacerbation but musculoskeletal in nature.  We will continue airway clearance mechanisms as prescribed.  2.  No evidence of acute fracture on CT spine.  Recommend continued therapy with alendronate and supplemental vitamin D and calcium.  3.  She is followed both with us and in CF clinic at [place].  Recommend routine follow-up or sooner if pulmonary symptoms arise.  Continue airway clearance mechanisms.  4.  This appears to be musculoskeletal in nature.  Is currently responding to anti-inflammatories and muscle relaxants.  I did offer the patient referral to physical therapy and back class should this be related to chronic degenerative joint disease given the chronic T4 finding.  Patient declined today.  I recommend she take some time to recover particularly while taking muscle relaxants.  She will follow-up with PCP.  Return for f/u.

## 2019-07-23 ENCOUNTER — OFFICE VISIT (OUTPATIENT)
Dept: PULMONOLOGY | Facility: HOSPICE | Age: 69
End: 2019-07-23
Payer: MEDICARE

## 2019-07-23 VITALS
TEMPERATURE: 97.3 F | RESPIRATION RATE: 16 BRPM | HEIGHT: 63 IN | DIASTOLIC BLOOD PRESSURE: 74 MMHG | BODY MASS INDEX: 34.55 KG/M2 | OXYGEN SATURATION: 90 % | WEIGHT: 195 LBS | SYSTOLIC BLOOD PRESSURE: 114 MMHG | HEART RATE: 78 BPM

## 2019-07-23 DIAGNOSIS — E84.9 CYSTIC FIBROSIS (HCC): ICD-10-CM

## 2019-07-23 DIAGNOSIS — J47.9 BRONCHIECTASIS WITHOUT COMPLICATION (HCC): ICD-10-CM

## 2019-07-23 PROCEDURE — 99213 OFFICE O/P EST LOW 20 MIN: CPT | Performed by: INTERNAL MEDICINE

## 2019-07-23 ASSESSMENT — ENCOUNTER SYMPTOMS
SPUTUM PRODUCTION: 0
HEARTBURN: 0
HEADACHES: 0
NAUSEA: 0
MYALGIAS: 0
VOMITING: 0
WEAKNESS: 0
FOCAL WEAKNESS: 0
CLAUDICATION: 0
HEMOPTYSIS: 0
DOUBLE VISION: 0
PALPITATIONS: 0
WEIGHT LOSS: 0
CHILLS: 0
DIAPHORESIS: 0
SHORTNESS OF BREATH: 0
BACK PAIN: 0
FALLS: 0
EYE PAIN: 0
SPEECH CHANGE: 0
DIZZINESS: 0
EYE REDNESS: 0
ORTHOPNEA: 0
DIARRHEA: 0
ABDOMINAL PAIN: 0
SORE THROAT: 0
NECK PAIN: 0
WHEEZING: 0
CONSTIPATION: 0
COUGH: 0
BLURRED VISION: 0
DEPRESSION: 0
PND: 0
PHOTOPHOBIA: 0
TREMORS: 0
EYE DISCHARGE: 0
STRIDOR: 0
SINUS PAIN: 0
FEVER: 0

## 2019-07-23 NOTE — PROGRESS NOTES
Chief Complaint   Patient presents with   • Follow-Up     Bronchiectasis without complication last seen 6/3/19         HPI: This patient is a 69 y.o. female whom is followed in our clinic for CF last seen by me on 5/30/19. The pt has an adult-diagnosis of Cystic Fibrosis, genotype [id number]/R117H+5Tcis, positive sweat chloride test of 99 on 11/29/17 with multisystem involvement including pancreatic insufficiency. 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  She is also growing mucoid Pseudomonas and MSSA. She has been advised to follow a regimen of Pulmozyme, inhaled Tobramycin, Acapella therapy, Xopenex nebs as well as nebulized saline and chest vest therapy for airway clearance.  The pt has not been compliant with all therapies due to time needed and relative lack of sxs.  I last saw her for back pain on 5/30 which persisted after empiric tx of bronchiectasis flair on 5/23.  We r/o spinal fx with CT and tx symptomatically with NSAIDs.  She has since had resolution of her back pain and was seen in Chest clinic at [facility] on 6/6/19 with stable PFTs.  She does endorse occasional cough with increased episodes of severity and sputum production maybe once per week.  She has not been doing regular mucous clearance or Immanuel nebs as prescribed as per above. She recently has abd US demonstrating fatty liver and has PCP f/u.     Past Medical History:   Diagnosis Date   • Bronchitis 2010   • Cancer (HCC) 2015    skin ca   • Coughing blood     intermittent cough unsure if allergies   • Heart burn    • Indigestion    • Other specified disorder of intestines     diverticuli   • Other specified symptom associated with female genital organs     pelvic prolapse   • Pain     intestines   • Pneumonia 06/2015       Social History     Social History   • Marital status:      Spouse name: N/A   • Number of  children: N/A   • Years of education: N/A     Occupational History   • Not on file.     Social History Main Topics   • Smoking status: Former Smoker     Packs/day: 1.00     Years: 20.00     Types: Cigarettes     Start date: 1/1/1964     Quit date: 1/1/1984   • Smokeless tobacco: Never Used   • Alcohol use 3.0 oz/week     5 Glasses of wine per week      Comment: 5 per week   • Drug use: No   • Sexual activity: Not on file     Other Topics Concern   • Not on file     Social History Narrative   • No narrative on file       Family History   Problem Relation Age of Onset   • Cancer Mother    • Alcohol abuse Father    • Cancer Daughter        Current Outpatient Prescriptions on File Prior to Visit   Medication Sig Dispense Refill   • Omeprazole Magnesium (PRILOSEC OTC PO) Take  by mouth.     • calcium carbonate (TUMS) 500 MG Chew Tab Take 500 mg by mouth every day.     • alendronate (FOSAMAX) 70 MG Tab TAKE ONE TABLET BY MOUTH ONCE A WEEK     • CALCIUM CARBONATE ANTACID PO Take  by mouth.     • Calcium Carb-Cholecalciferol (OYSCO 500 + D) 500-200 MG-UNIT Tab Take 1 tablet by mouth.     • CHOLECALCIFEROL PO Take 5,000 Units by mouth.     • Ivacaftor (KALYDECO) 150 MG Tab Take 1 tablet by mouth.     • sucralfate (CARAFATE) 1 GM Tab Take 1 g by mouth.     • Tezacaftor-Ivacaftor&Ivacaftor (SYMDEKO) 100-150 & 150 MG Tablet Therapy Pack Take 1 tablet by mouth.     • glucose blood (ONE TOUCH ULTRA TEST) strip 1 Strip by Other route.     • ONE TOUCH ULTRASOFT LANCETS Misc 1 Each by Other route.     • Blood Glucose Monitoring Suppl (ONE TOUCH ULTRA 2) w/Device Kit U UTD TO TEST BLOOD SUGAR  0   • glucose blood (ONE TOUCH ULTRA TEST) strip 1 Each by Other route.     • Multiple Vitamin (MULTI-VITAMIN DAILY PO) Take 10,000 Units by mouth.     • ONE TOUCH ULTRASOFT LANCETS Misc 1 Each by Other route.     • Cholecalciferol (VITAMIN D PO) Take  by mouth.     • diazepam (VALIUM) 2 MG Tab      • sterile water SOLN 2.1 mL with sodium  chloride 4 mEq/mL SOLN 3.6 mEq 3 mL by Nebulization route 3 times a day. 90 Ampule 11   • CHOLESTYRAMINE PO Take  by mouth every day.     • hydrocodone-acetaminophen (NORCO) 5-325 MG Tab per tablet Take 1-2 Tabs by mouth every 6 hours as needed. 30 Tab 0   • levoFLOXacin (LEVAQUIN) 500 MG tablet Take 1.5 Tabs by mouth every day. (Patient not taking: Reported on 6/3/2019) 11 Tab 0   • alendronate (FOSAMAX) 70 MG Tab      • benzonatate (TESSALON) 100 MG Cap      • ciprofloxacin (CIPRO) 750 MG Tab      • levoFLOXacin (LEVAQUIN) 750 MG tablet      • oseltamivir (TAMIFLU) 75 MG Cap      • polyethylene glycol 3350 (MIRALAX) Powder Take 34 g by mouth.     • Pancrelipase, Lip-Prot-Amyl, (CREON) 48634 units Cap DR Particles TAKE 2 CAPSULES BY MOUTH WITH MEALS THREE TIMES DAILY AND 1 CAPSULE BY MOUTH WITH SNACKS THREE TIMES DAILY, 9 CAPSULES PER DAY     • diazePAM (VALIUM) 2 MG Tab Take 2 mg by mouth.     • dornase alpha (PULMOZYME) 1 MG/ML Solution 2.51 mg by Nebulization route.     • HYDROcodone-acetaminophen (NORCO) 5-325 MG Tab per tablet Take 1-2 tablet by mouth.     • levalbuterol (XOPENEX) 1.25 MG/3ML Nebu Soln USE 1 VIAL VIA NEBULIZER FOUR TIMES DAILY PRN     • levalbuterol (XOPENEX HFA) 45 MCG/ACT inhaler 2 Puffs by Nebulization route.     • PULMOZYME 1 MG/ML Solution VVN D  1   • sodium chloride (HYPER-SAL) 7 % Nebu Soln VVN BID  1   • Pancrelipase, Lip-Prot-Amyl, (CREON) 13213 units Cap DR Particles Take 2 tabs PO w/ meals TID & 1 tab PO w/ snacks TID, 9 cap/day, 270 caps per month, & 810 caps per 90 days, please disp 90 days if possible     • diazePAM (VALIUM) 2 MG Tab Take 2 mg by mouth.     • HYDROcodone-acetaminophen (NORCO) 5-325 MG Tab per tablet Take  by mouth.     • levalbuterol (XOPENEX HFA) 45 MCG/ACT inhaler 2 Puffs by Nebulization route.     • levalbuterol (XOPENEX) 1.25 MG/3ML Nebu Soln 3 mL by Nebulization route every four hours as needed for Shortness of Breath (Cough, Wheezing.). (Patient not  taking: Reported on 5/15/2019) 75 mL 11   • levalbuterol (XOPENEX HFA) 45 MCG/ACT inhaler Inhale 2 Puffs by mouth every four hours as needed for Shortness of Breath. (Patient not taking: Reported on 5/15/2019) 1 Inhaler 11   • albuterol (PROVENTIL) 2.5mg/3ml Nebu Soln solution for nebulization 3 mL by Nebulization route every four hours as needed for Shortness of Breath. (Patient not taking: Reported on 5/23/2019) 75 mL 11   • albuterol (PROAIR HFA) 108 (90 Base) MCG/ACT Aero Soln inhalation aerosol Inhale 2 Puffs by mouth every four hours as needed for Shortness of Breath (wheezing). (Patient not taking: Reported on 5/23/2019) 1 Inhaler 11   • oxycodone-acetaminophen (PERCOCET) 7.5-325 MG per tablet Take 1-2 Tabs by mouth every four hours as needed for Moderate Pain. (Patient not taking: Reported on 9/15/2017) 40 Tab 0   • cephALEXin (KEFLEX) 500 MG Cap Take 1 Cap by mouth every 6 hours. (Patient not taking: Reported on 6/3/2019) 40 Cap 0   • Pancrelipase, Lip-Prot-Amyl, 08240 UNITS Cap DR Particles Take  by mouth every day.       No current facility-administered medications on file prior to visit.        Albuterol; Ambien [zolpidem]; Bee venom; Flagyl [metronidazole]; Sulfa drugs; and Tape      ROS:   Review of Systems   Constitutional: Negative for chills, diaphoresis, fever, malaise/fatigue and weight loss.   HENT: Negative for congestion, ear discharge, ear pain, hearing loss, nosebleeds, sinus pain, sore throat and tinnitus.    Eyes: Negative for blurred vision, double vision, photophobia, pain, discharge and redness.   Respiratory: Negative for cough, hemoptysis, sputum production, shortness of breath, wheezing and stridor.    Cardiovascular: Negative for chest pain, palpitations, orthopnea, claudication, leg swelling and PND.   Gastrointestinal: Negative for abdominal pain, constipation, diarrhea, heartburn, nausea and vomiting.   Genitourinary: Negative for dysuria and urgency.   Musculoskeletal: Negative  "for back pain, falls, joint pain, myalgias and neck pain.   Skin: Negative for itching and rash.   Neurological: Negative for dizziness, tremors, speech change, focal weakness, weakness and headaches.   Endo/Heme/Allergies: Negative for environmental allergies.   Psychiatric/Behavioral: Negative for depression.       /96 (BP Location: Left arm, Patient Position: Sitting, BP Cuff Size: Adult)   Pulse 78   Temp 36.3 °C (97.3 °F) (Temporal)   Resp 16   Ht 1.6 m (5' 3\")   Wt 88.5 kg (195 lb)   SpO2 90%   Physical Exam   Constitutional: She is oriented to person, place, and time. She appears well-developed and well-nourished.   Moderately obese   HENT:   Head: Normocephalic and atraumatic.   Left Ear: External ear normal.   Mouth/Throat: Oropharynx is clear and moist. No oropharyngeal exudate.   Eyes: Pupils are equal, round, and reactive to light. Conjunctivae and EOM are normal. No scleral icterus.   Neck: Neck supple. No JVD present. No tracheal deviation present.   Cardiovascular: Normal rate, regular rhythm and normal heart sounds.  Exam reveals no gallop and no friction rub.    No murmur heard.  Pulmonary/Chest: Effort normal and breath sounds normal. No accessory muscle usage. No respiratory distress. She has no wheezes. She has no rales.   Abdominal: Soft. She exhibits no distension. There is no tenderness.   Musculoskeletal: Normal range of motion. She exhibits no edema, tenderness or deformity.   Lymphadenopathy:     She has no cervical adenopathy.   Neurological: She is alert and oriented to person, place, and time. No cranial nerve deficit. Gait normal.   Skin: Skin is warm and dry. No rash noted. No cyanosis. Nails show no clubbing.   Psychiatric: She has a normal mood and affect.       Assessment:  1. Bronchiectasis without complication (HCC)     2. Cystic fibrosis (HCC)     3. BMI 33.0-33.9,adult         Plan:  1. No e/o acute exacerbation today.  We discussed doing mucous clearance with " mucinex and breathing treatment at least 1-2x daily on her days with increased cough.  Ideally she would do this daily with Immanuel nebs but pt relatively asx.   2. Colonized with mucoid PsA and MSSA and well as hx of CHARIS but no recurrent or recent exacerbations.  PFTs stable last mo at [place]. Recommend mucous clearance as per above.  3. Pt about to start modified intermittent fasting.  We discussed today more regarding eliminating or minimizing processed foods and particularly highly processed carbohydrates.  Pt to f/u with PCP.   Return in about 6 months (around 1/23/2020) for CF.

## 2019-11-12 ENCOUNTER — APPOINTMENT (RX ONLY)
Dept: URBAN - METROPOLITAN AREA CLINIC 4 | Facility: CLINIC | Age: 69
Setting detail: DERMATOLOGY
End: 2019-11-12

## 2019-11-12 DIAGNOSIS — L82.1 OTHER SEBORRHEIC KERATOSIS: ICD-10-CM

## 2019-11-12 DIAGNOSIS — L30.4 ERYTHEMA INTERTRIGO: ICD-10-CM

## 2019-11-12 DIAGNOSIS — L57.0 ACTINIC KERATOSIS: ICD-10-CM

## 2019-11-12 DIAGNOSIS — L57.8 OTHER SKIN CHANGES DUE TO CHRONIC EXPOSURE TO NONIONIZING RADIATION: ICD-10-CM

## 2019-11-12 DIAGNOSIS — D18.0 HEMANGIOMA: ICD-10-CM

## 2019-11-12 DIAGNOSIS — L81.4 OTHER MELANIN HYPERPIGMENTATION: ICD-10-CM

## 2019-11-12 DIAGNOSIS — Z71.89 OTHER SPECIFIED COUNSELING: ICD-10-CM

## 2019-11-12 DIAGNOSIS — L72.8 OTHER FOLLICULAR CYSTS OF THE SKIN AND SUBCUTANEOUS TISSUE: ICD-10-CM

## 2019-11-12 DIAGNOSIS — Z85.828 PERSONAL HISTORY OF OTHER MALIGNANT NEOPLASM OF SKIN: ICD-10-CM

## 2019-11-12 DIAGNOSIS — D22 MELANOCYTIC NEVI: ICD-10-CM

## 2019-11-12 PROBLEM — D22.5 MELANOCYTIC NEVI OF TRUNK: Status: ACTIVE | Noted: 2019-11-12

## 2019-11-12 PROBLEM — D18.01 HEMANGIOMA OF SKIN AND SUBCUTANEOUS TISSUE: Status: ACTIVE | Noted: 2019-11-12

## 2019-11-12 PROBLEM — D48.5 NEOPLASM OF UNCERTAIN BEHAVIOR OF SKIN: Status: ACTIVE | Noted: 2019-11-12

## 2019-11-12 PROCEDURE — 99214 OFFICE O/P EST MOD 30 MIN: CPT | Mod: 25

## 2019-11-12 PROCEDURE — 17003 DESTRUCT PREMALG LES 2-14: CPT

## 2019-11-12 PROCEDURE — 11102 TANGNTL BX SKIN SINGLE LES: CPT

## 2019-11-12 PROCEDURE — 17000 DESTRUCT PREMALG LESION: CPT | Mod: 59

## 2019-11-12 PROCEDURE — ? MEDICATION COUNSELING

## 2019-11-12 PROCEDURE — ? BIOPSY BY SHAVE METHOD

## 2019-11-12 PROCEDURE — ? LIQUID NITROGEN

## 2019-11-12 PROCEDURE — ? PRESCRIPTION

## 2019-11-12 PROCEDURE — ? COUNSELING

## 2019-11-12 PROCEDURE — ? ADDITIONAL NOTES

## 2019-11-12 RX ORDER — KETOCONAZOLE 20 MG/G
CREAM TOPICAL
Qty: 1 | Refills: 3 | Status: ERX

## 2019-11-12 ASSESSMENT — LOCATION ZONE DERM
LOCATION ZONE: FINGER
LOCATION ZONE: ARM
LOCATION ZONE: TRUNK
LOCATION ZONE: HAND
LOCATION ZONE: FACE
LOCATION ZONE: LEG

## 2019-11-12 ASSESSMENT — LOCATION DETAILED DESCRIPTION DERM
LOCATION DETAILED: UPPER STERNUM
LOCATION DETAILED: LEFT INFERIOR MEDIAL MIDBACK
LOCATION DETAILED: LEFT CENTRAL MALAR CHEEK
LOCATION DETAILED: LEFT PROXIMAL DORSAL FOREARM
LOCATION DETAILED: LEFT DORSAL WRIST
LOCATION DETAILED: RIGHT LATERAL BREAST 6-7:00 REGION
LOCATION DETAILED: LEFT INFERIOR CENTRAL MALAR CHEEK
LOCATION DETAILED: RIGHT PROXIMAL DORSAL MIDDLE FINGER
LOCATION DETAILED: RIGHT DISTAL DORSAL FOREARM
LOCATION DETAILED: RIGHT RADIAL DORSAL HAND
LOCATION DETAILED: LEFT FOREHEAD
LOCATION DETAILED: RIGHT SUPERIOR MEDIAL MIDBACK
LOCATION DETAILED: RIGHT INFERIOR CENTRAL MALAR CHEEK
LOCATION DETAILED: LEFT DISTAL DORSAL FOREARM
LOCATION DETAILED: STERNAL NOTCH
LOCATION DETAILED: RIGHT PROXIMAL DORSAL FOREARM
LOCATION DETAILED: RIGHT FOREHEAD
LOCATION DETAILED: MIDDLE STERNUM
LOCATION DETAILED: RIGHT ULNAR DORSAL HAND
LOCATION DETAILED: RIGHT INFERIOR MEDIAL MALAR CHEEK
LOCATION DETAILED: LEFT MEDIAL BREAST 6-7:00 REGION
LOCATION DETAILED: LEFT PROXIMAL CALF
LOCATION DETAILED: INFERIOR THORACIC SPINE

## 2019-11-12 ASSESSMENT — LOCATION SIMPLE DESCRIPTION DERM
LOCATION SIMPLE: RIGHT MIDDLE FINGER
LOCATION SIMPLE: RIGHT FOREHEAD
LOCATION SIMPLE: RIGHT BREAST
LOCATION SIMPLE: LEFT WRIST
LOCATION SIMPLE: RIGHT HAND
LOCATION SIMPLE: RIGHT LOWER BACK
LOCATION SIMPLE: UPPER BACK
LOCATION SIMPLE: LEFT CALF
LOCATION SIMPLE: RIGHT CHEEK
LOCATION SIMPLE: LEFT FOREHEAD
LOCATION SIMPLE: LEFT LOWER BACK
LOCATION SIMPLE: LEFT FOREARM
LOCATION SIMPLE: LEFT CHEEK
LOCATION SIMPLE: CHEST
LOCATION SIMPLE: LEFT BREAST
LOCATION SIMPLE: RIGHT FOREARM

## 2019-11-12 NOTE — HPI: FULL BODY SKIN EXAMINATION
How Severe Are Your Spot(S)?: mild
What Type Of Note Output Would You Prefer (Optional)?: Standard Output
What Is The Reason For Today's Visit?: Full Body Skin Examination
What Is The Reason For Today's Visit? (Being Monitored For X): concerning skin lesions on an annual basis
Additional History: Patient grew up in [place]. History of blistering sunburns.

## 2019-11-12 NOTE — PROCEDURE: ADDITIONAL NOTES
Additional Notes: Reassure and observe for any changes.
Detail Level: Simple
Additional Notes: Will prescribe Ketoconazole and send Rx to pharmacy. \\nExplained in detail how to apply anti-fungal cream BID x14 days.

## 2019-11-12 NOTE — PROCEDURE: BIOPSY BY SHAVE METHOD
Consent: Written consent was obtained and risks were reviewed including but not limited to scarring, infection, bleeding, scabbing, incomplete removal, nerve damage and allergy to anesthesia.
Electrodesiccation Text: The wound bed was treated with electrodesiccation after the biopsy was performed.
Bill For Surgical Tray: no
Electrodesiccation And Curettage Text: The wound bed was treated with electrodesiccation and curettage after the biopsy was performed.
Was A Bandage Applied: Yes
Type Of Destruction Used: Curettage
Anesthesia Volume In Cc: 0
Biopsy Method: 15 blade
Dressing: Band-Aid
Detail Level: Detailed
Silver Nitrate Text: The wound bed was treated with silver nitrate after the biopsy was performed.
Curettage Text: The wound bed was treated with curettage after the biopsy was performed.
Billing Type: Third-Party Bill
Hemostasis: Aluminum Chloride and Electrocautery
Post-Care Instructions: I reviewed with the patient in detail post-care instructions. Patient is to keep the biopsy site dry overnight, and then apply bacitracin twice daily until healed. Patient may apply hydrogen peroxide soaks to remove any crusting.
Size Of Lesion In Cm: 1.5
Lab: [id number]
Lab Facility: 
Anesthesia Type: 1% lidocaine with epinephrine
Depth Of Biopsy: dermis
Cryotherapy Text: The wound bed was treated with cryotherapy after the biopsy was performed.
Notification Instructions: Patient will be notified of biopsy results. However, patient instructed to call the office if not contacted within 2 weeks.
Biopsy Type: H and E
Wound Care: Aquaphor

## 2019-11-12 NOTE — PROCEDURE: MEDICATION COUNSELING
Clofazimine Pregnancy And Lactation Text: This medication is Pregnancy Category C and isn't considered safe during pregnancy. It is excreted in breast milk.
Albendazole Counseling:  I discussed with the patient the risks of albendazole including but not limited to cytopenia, kidney damage, nausea/vomiting and severe allergy.  The patient understands that this medication is being used in an off-label manner.
Oxybutynin Pregnancy And Lactation Text: This medication is Pregnancy Category B and is considered safe during pregnancy. It is unknown if it is excreted in breast milk.
Cephalexin Counseling: I counseled the patient regarding use of cephalexin as an antibiotic for prophylactic and/or therapeutic purposes. Cephalexin (commonly prescribed under brand name Keflex) is a cephalosporin antibiotic which is active against numerous classes of bacteria, including most skin bacteria. Side effects may include nausea, diarrhea, gastrointestinal upset, rash, hives, yeast infections, and in rare cases, hepatitis, kidney disease, seizures, fever, confusion, neurologic symptoms, and others. Patients with severe allergies to penicillin medications are cautioned that there is about a 10% incidence of cross-reactivity with cephalosporins. When possible, patients with penicillin allergies should use alternatives to cephalosporins for antibiotic therapy.
Elidel Pregnancy And Lactation Text: This medication is Pregnancy Category C. It is unknown if this medication is excreted in breast milk.
Odomzo Counseling- I discussed with the patient the risks of Odomzo including but not limited to nausea, vomiting, diarrhea, constipation, weight loss, changes in the sense of taste, decreased appetite, muscle spasms, and hair loss.  The patient verbalized understanding of the proper use and possible adverse effects of Odomzo.  All of the patient's questions and concerns were addressed.
Enbrel Pregnancy And Lactation Text: This medication is Pregnancy Category B and is considered safe during pregnancy. It is unknown if this medication is excreted in breast milk.
Topical Retinoid counseling:  Patient advised to apply a pea-sized amount only at bedtime and wait 30 minutes after washing their face before applying.  If too drying, patient may add a non-comedogenic moisturizer. The patient verbalized understanding of the proper use and possible adverse effects of retinoids.  All of the patient's questions and concerns were addressed.
Tremfya Counseling: I discussed with the patient the risks of guselkumab including but not limited to immunosuppression, serious infections, worsening of inflammatory bowel disease and drug reactions.  The patient understands that monitoring is required including a PPD at baseline and must alert us or the primary physician if symptoms of infection or other concerning signs are noted.
Acitretin Counseling:  I discussed with the patient the risks of acitretin including but not limited to hair loss, dry lips/skin/eyes, liver damage, hyperlipidemia, depression/suicidal ideation, photosensitivity.  Serious rare side effects can include but are not limited to pancreatitis, pseudotumor cerebri, bony changes, clot formation/stroke/heart attack.  Patient understands that alcohol is contraindicated since it can result in liver toxicity and significantly prolong the elimination of the drug by many years.
Humira Counseling:  I discussed with the patient the risks of adalimumab including but not limited to myelosuppression, immunosuppression, autoimmune hepatitis, demyelinating diseases, lymphoma, and serious infections.  The patient understands that monitoring is required including a PPD at baseline and must alert us or the primary physician if symptoms of infection or other concerning signs are noted.
Odomzo Pregnancy And Lactation Text: This medication is Pregnancy Category X and is absolutely contraindicated during pregnancy. It is unknown if it is excreted in breast milk.
Eucrisa Counseling: Patient may experience a mild burning sensation during topical application. Eucrisa is not approved in children less than 2 years of age.
Cephalexin Pregnancy And Lactation Text: This medication is Pregnancy Category B and considered safe during pregnancy.  It is also excreted in breast milk but can be used safely for shorter doses.
Propranolol Counseling:  I discussed with the patient the risks of propranolol including but not limited to low heart rate, low blood pressure, low blood sugar, restlessness and increased cold sensitivity. They should call the office if they experience any of these side effects.
Tremfya Pregnancy And Lactation Text: The risk during pregnancy and breastfeeding is uncertain with this medication.
Fluconazole Counseling:  Patient counseled regarding adverse effects of fluconazole including but not limited to headache, diarrhea, nausea, upset stomach, liver function test abnormalities, taste disturbance, and stomach pain.  There is a rare possibility of liver failure that can occur when taking fluconazole.  The patient understands that monitoring of LFTs and kidney function test may be required, especially at baseline. The patient verbalized understanding of the proper use and possible adverse effects of fluconazole.  All of the patient's questions and concerns were addressed.
Colchicine Counseling:  Patient counseled regarding adverse effects including but not limited to stomach upset (nausea, vomiting, stomach pain, or diarrhea).  Patient instructed to limit alcohol consumption while taking this medication.  Colchicine may reduce blood counts especially with prolonged use.  The patient understands that monitoring of kidney function and blood counts may be required, especially at baseline. The patient verbalized understanding of the proper use and possible adverse effects of colchicine.  All of the patient's questions and concerns were addressed.
Albendazole Pregnancy And Lactation Text: This medication is Pregnancy Category C and it isn't known if it is safe during pregnancy. It is also excreted in breast milk.
Tazorac Counseling:  Patient advised that medication is irritating and drying.  Patient may need to apply sparingly and wash off after an hour before eventually leaving it on overnight.  The patient verbalized understanding of the proper use and possible adverse effects of tazorac.  All of the patient's questions and concerns were addressed.
Fluconazole Pregnancy And Lactation Text: This medication is Pregnancy Category C and it isn't know if it is safe during pregnancy. It is also excreted in breast milk.
Xeljanz Counseling: I discussed with the patient the risks of Xeljanz therapy including increased risk of infection, liver issues, headache, diarrhea, or cold symptoms. Live vaccines should be avoided. They were instructed to call if they have any problems.
Acitretin Pregnancy And Lactation Text: This medication is Pregnancy Category X and should not be given to women who are pregnant or may become pregnant in the future. This medication is excreted in breast milk.
Otezla Counseling: The side effects of Otezla were discussed with the patient, including but not limited to worsening or new depression, weight loss, diarrhea, nausea, upper respiratory tract infection, and headache. Patient instructed to call the office should any adverse effect occur.  The patient verbalized understanding of the proper use and possible adverse effects of Otezla.  All the patient's questions and concerns were addressed.
Eucrisa Pregnancy And Lactation Text: This medication has not been assigned a Pregnancy Risk Category but animal studies failed to show danger with the topical medication. It is unknown if the medication is excreted in breast milk.
Propranolol Pregnancy And Lactation Text: This medication is Pregnancy Category C and it isn't known if it is safe during pregnancy. It is excreted in breast milk.
Clindamycin Counseling: I counseled the patient regarding use of clindamycin as an antibiotic for prophylactic and/or therapeutic purposes. Clindamycin is active against numerous classes of bacteria, including skin bacteria. Side effects may include nausea, diarrhea, gastrointestinal upset, rash, hives, yeast infections, and in rare cases, colitis.
Bexarotene Counseling:  I discussed with the patient the risks of bexarotene including but not limited to hair loss, dry lips/skin/eyes, liver abnormalities, hyperlipidemia, pancreatitis, depression/suicidal ideation, photosensitivity, drug rash/allergic reactions, hypothyroidism, anemia, leukopenia, infection, cataracts, and teratogenicity.  Patient understands that they will need regular blood tests to check lipid profile, liver function tests, white blood cell count, thyroid function tests and pregnancy test if applicable.
Ivermectin Counseling:  Patient instructed to take medication on an empty stomach with a full glass of water.  Patient informed of potential adverse effects including but not limited to nausea, diarrhea, dizziness, itching, and swelling of the extremities or lymph nodes.  The patient verbalized understanding of the proper use and possible adverse effects of ivermectin.  All of the patient's questions and concerns were addressed.
Dapsone Counseling: I discussed with the patient the risks of dapsone including but not limited to hemolytic anemia, agranulocytosis, rashes, methemoglobinemia, kidney failure, peripheral neuropathy, headaches, GI upset, and liver toxicity.  Patients who start dapsone require monitoring including baseline LFTs and weekly CBCs for the first month, then every month thereafter.  The patient verbalized understanding of the proper use and possible adverse effects of dapsone.  All of the patient's questions and concerns were addressed.
Birth Control Pills Counseling: Birth Control Pill Counseling: I discussed with the patient the potential side effects of OCPs including but not limited to increased risk of stroke, heart attack, thrombophlebitis, deep venous thrombosis, hepatic adenomas, breast changes, GI upset, headaches, and depression.  The patient verbalized understanding of the proper use and possible adverse effects of OCPs. All of the patient's questions and concerns were addressed.
Clindamycin Pregnancy And Lactation Text: This medication can be used in pregnancy if certain situations. Clindamycin is also present in breast milk.
Hydroquinone Counseling:  Patient advised that medication may result in skin irritation, lightening (hypopigmentation), dryness, and burning.  In the event of skin irritation, the patient was advised to reduce the amount of the drug applied or use it less frequently.  Rarely, spots that are treated with hydroquinone can become darker (pseudoochronosis).  Should this occur, patient instructed to stop medication and call the office. The patient verbalized understanding of the proper use and possible adverse effects of hydroquinone.  All of the patient's questions and concerns were addressed.
Otezla Pregnancy And Lactation Text: This medication is Pregnancy Category C and it isn't known if it is safe during pregnancy. It is unknown if it is excreted in breast milk.
Ilumya Counseling: I discussed with the patient the risks of tildrakizumab including but not limited to immunosuppression, malignancy, posterior leukoencephalopathy syndrome, and serious infections.  The patient understands that monitoring is required including a PPD at baseline and must alert us or the primary physician if symptoms of infection or other concerning signs are noted.
Tazorac Pregnancy And Lactation Text: This medication is not safe during pregnancy. It is unknown if this medication is excreted in breast milk.
Griseofulvin Counseling:  I discussed with the patient the risks of griseofulvin including but not limited to photosensitivity, cytopenia, liver damage, nausea/vomiting and severe allergy.  The patient understands that this medication is best absorbed when taken with a fatty meal (e.g., ice cream or french fries).
Xelsandritaz Pregnancy And Lactation Text: This medication is Pregnancy Category D and is not considered safe during pregnancy.  The risk during breast feeding is also uncertain.
Oxybutynin Counseling:  I discussed with the patient the risks of oxybutynin including but not limited to skin rash, drowsiness, dry mouth, difficulty urinating, and blurred vision.
Doxycycline Counseling:  Patient counseled regarding possible photosensitivity and increased risk for sunburn.  Patient instructed to avoid sunlight, if possible.  When exposed to sunlight, patients should wear protective clothing, sunglasses, and sunscreen.  The patient was instructed to call the office immediately if the following severe adverse effects occur:  hearing changes, easy bruising/bleeding, severe headache, or vision changes.  The patient verbalized understanding of the proper use and possible adverse effects of doxycycline.  All of the patient's questions and concerns were addressed.
Xolair Counseling:  Patient informed of potential adverse effects including but not limited to fever, muscle aches, rash and allergic reactions.  The patient verbalized understanding of the proper use and possible adverse effects of Xolair.  All of the patient's questions and concerns were addressed.
Griseofulvin Pregnancy And Lactation Text: This medication is Pregnancy Category X and is known to cause serious birth defects. It is unknown if this medication is excreted in breast milk but breast feeding should be avoided.
Birth Control Pills Pregnancy And Lactation Text: This medication should be avoided if pregnant and for the first 30 days post-partum.
Topical Clindamycin Counseling: Patient counseled that this medication may cause skin irritation or allergic reactions.  In the event of skin irritation, the patient was advised to reduce the amount of the drug applied or use it less frequently.   The patient verbalized understanding of the proper use and possible adverse effects of clindamycin.  All of the patient's questions and concerns were addressed.
Bexarotene Pregnancy And Lactation Text: This medication is Pregnancy Category X and should not be given to women who are pregnant or may become pregnant. This medication should not be used if you are breast feeding.
Dapsone Pregnancy And Lactation Text: This medication is Pregnancy Category C and is not considered safe during pregnancy or breast feeding.
Xolair Pregnancy And Lactation Text: This medication is Pregnancy Category B and is considered safe during pregnancy. This medication is excreted in breast milk.
Itraconazole Counseling:  I discussed with the patient the risks of itraconazole including but not limited to liver damage, nausea/vomiting, neuropathy, and severe allergy.  The patient understands that this medication is best absorbed when taken with acidic beverages such as non-diet cola or ginger ale.  The patient understands that monitoring is required including baseline LFTs and repeat LFTs at intervals.  The patient understands that they are to contact us or the primary physician if concerning signs are noted.
Erivedge Counseling- I discussed with the patient the risks of Erivedge including but not limited to nausea, vomiting, diarrhea, constipation, weight loss, changes in the sense of taste, decreased appetite, muscle spasms, and hair loss.  The patient verbalized understanding of the proper use and possible adverse effects of Erivedge.  All of the patient's questions and concerns were addressed.
Isotretinoin Counseling: Patient should get monthly blood tests, not donate blood, not drive at night if vision affected, not share medication, and not undergo elective surgery for 6 months after tx completed. Side effects reviewed, pt to contact office should one occur.
Imiquimod Counseling:  I discussed with the patient the risks of imiquimod including but not limited to erythema, scaling, itching, weeping, crusting, and pain.  Patient understands that the inflammatory response to imiquimod is variable from person to person and was educated regarded proper titration schedule.  If flu-like symptoms develop, patient knows to discontinue the medication and contact us.
Infliximab Counseling:  I discussed with the patient the risks of infliximab including but not limited to myelosuppression, immunosuppression, autoimmune hepatitis, demyelinating diseases, lymphoma, and serious infections.  The patient understands that monitoring is required including a PPD at baseline and must alert us or the primary physician if symptoms of infection or other concerning signs are noted.
Doxycycline Pregnancy And Lactation Text: This medication is Pregnancy Category D and not consider safe during pregnancy. It is also excreted in breast milk but is considered safe for shorter treatment courses.
Spironolactone Counseling: Patient advised regarding risks of diarrhea, abdominal pain, hyperkalemia, birth defects (for female patients), liver toxicity and renal toxicity. The patient may need blood work to monitor liver and kidney function and potassium levels while on therapy. The patient verbalized understanding of the proper use and possible adverse effects of spironolactone.  All of the patient's questions and concerns were addressed.
Isotretinoin Pregnancy And Lactation Text: This medication is Pregnancy Category X and is considered extremely dangerous during pregnancy. It is unknown if it is excreted in breast milk.
Erythromycin Counseling:  I discussed with the patient the risks of erythromycin including but not limited to GI upset, allergic reaction, drug rash, diarrhea, increase in liver enzymes, and yeast infections.
Topical Sulfur Applications Counseling: Topical Sulfur Counseling: Patient counseled that this medication may cause skin irritation or allergic reactions.  In the event of skin irritation, the patient was advised to reduce the amount of the drug applied or use it less frequently.   The patient verbalized understanding of the proper use and possible adverse effects of topical sulfur application.  All of the patient's questions and concerns were addressed.
Spironolactone Pregnancy And Lactation Text: This medication can cause feminization of the male fetus and should be avoided during pregnancy. The active metabolite is also found in breast milk.
Rituxan Counseling:  I discussed with the patient the risks of Rituxan infusions. Side effects can include infusion reactions, severe drug rashes including mucocutaneous reactions, reactivation of latent hepatitis and other infections and rarely progressive multifocal leukoencephalopathy.  All of the patient's questions and concerns were addressed.
Minoxidil Counseling: Minoxidil is a topical medication which can increase blood flow where it is applied. It is uncertain how this medication increases hair growth. Side effects are uncommon and include stinging and allergic reactions.
Erythromycin Pregnancy And Lactation Text: This medication is Pregnancy Category B and is considered safe during pregnancy. It is also excreted in breast milk.
Ketoconazole Counseling:   Patient counseled regarding improving absorption with orange juice.  Adverse effects include but are not limited to breast enlargement, headache, diarrhea, nausea, upset stomach, liver function test abnormalities, taste disturbance, and stomach pain.  There is a rare possibility of liver failure that can occur when taking ketoconazole. The patient understands that monitoring of LFTs may be required, especially at baseline. The patient verbalized understanding of the proper use and possible adverse effects of ketoconazole.  All of the patient's questions and concerns were addressed.
Azathioprine Counseling:  I discussed with the patient the risks of azathioprine including but not limited to myelosuppression, immunosuppression, hepatotoxicity, lymphoma, and infections.  The patient understands that monitoring is required including baseline LFTs, Creatinine, possible TPMP genotyping and weekly CBCs for the first month and then every 2 weeks thereafter.  The patient verbalized understanding of the proper use and possible adverse effects of azathioprine.  All of the patient's questions and concerns were addressed.
SSKI Counseling:  I discussed with the patient the risks of SSKI including but not limited to thyroid abnormalities, metallic taste, GI upset, fever, headache, acne, arthralgias, paraesthesias, lymphadenopathy, easy bleeding, arrhythmias, and allergic reaction.
Topical Sulfur Applications Pregnancy And Lactation Text: This medication is Pregnancy Category C and has an unknown safety profile during pregnancy. It is unknown if this topical medication is excreted in breast milk.
High Dose Vitamin A Counseling: Side effects reviewed, pt to contact office should one occur.
Finasteride Male Counseling: Finasteride Counseling:  I discussed with the patient the risks of use of finasteride including but not limited to decreased libido, decreased ejaculate volume, gynecomastia, and depression. Women should not handle medication.  All of the patient's questions and concerns were addressed.
Wartpeel Counseling:  I discussed with the patient the risks of Wartpeel including but not limited to erythema, scaling, itching, weeping, crusting, and pain.
Azathioprine Pregnancy And Lactation Text: This medication is Pregnancy Category D and isn't considered safe during pregnancy. It is unknown if this medication is excreted in breast milk.
Ketoconazole Pregnancy And Lactation Text: This medication is Pregnancy Category C and it isn't know if it is safe during pregnancy. It is also excreted in breast milk and breast feeding isn't recommended.
High Dose Vitamin A Pregnancy And Lactation Text: High dose vitamin A therapy is contraindicated during pregnancy and breast feeding.
Finasteride Pregnancy And Lactation Text: This medication is absolutely contraindicated during pregnancy. It is unknown if it is excreted in breast milk.
Rituxan Pregnancy And Lactation Text: This medication is Pregnancy Category C and it isn't know if it is safe during pregnancy. It is unknown if this medication is excreted in breast milk but similar antibodies are known to be excreted.
Metronidazole Counseling:  I discussed with the patient the risks of metronidazole including but not limited to seizures, nausea/vomiting, a metallic taste in the mouth, nausea/vomiting and severe allergy.
Sski Pregnancy And Lactation Text: This medication is Pregnancy Category D and isn't considered safe during pregnancy. It is excreted in breast milk.
Gabapentin Counseling: I discussed with the patient the risks of gabapentin including but not limited to dizziness, somnolence, fatigue and ataxia.
Metronidazole Pregnancy And Lactation Text: This medication is Pregnancy Category B and considered safe during pregnancy.  It is also excreted in breast milk.
Siliq Counseling:  I discussed with the patient the risks of Siliq including but not limited to new or worsening depression, suicidal thoughts and behavior, immunosuppression, malignancy, posterior leukoencephalopathy syndrome, and serious infections.  The patient understands that monitoring is required including a PPD at baseline and must alert us or the primary physician if symptoms of infection or other concerning signs are noted. There is also a special program designed to monitor depression which is required with Siliq.
Mirvaso Counseling: Mirvaso is a topical medication which can decrease superficial blood flow where applied. Side effects are uncommon and include stinging, redness and allergic reactions.
Cellcept Counseling:  I discussed with the patient the risks of mycophenolate mofetil including but not limited to infection/immunosuppression, GI upset, hypokalemia, hypercholesterolemia, bone marrow suppression, lymphoproliferative disorders, malignancy, GI ulceration/bleed/perforation, colitis, interstitial lung disease, kidney failure, progressive multifocal leukoencephalopathy, and birth defects.  The patient understands that monitoring is required including a baseline creatinine and regular CBC testing. In addition, patient must alert us immediately if symptoms of infection or other concerning signs are noted.
Detail Level: Zone
Wartpeel Pregnancy And Lactation Text: This medication is Pregnancy Category X and contraindicated in pregnancy and in women who may become pregnant. It is unknown if this medication is excreted in breast milk.
Terbinafine Counseling: Patient counseling regarding adverse effects of terbinafine including but not limited to headache, diarrhea, rash, upset stomach, liver function test abnormalities, itching, taste/smell disturbance, nausea, abdominal pain, and flatulence.  There is a rare possibility of liver failure that can occur when taking terbinafine.  The patient understands that a baseline LFT and kidney function test may be required. The patient verbalized understanding of the proper use and possible adverse effects of terbinafine.  All of the patient's questions and concerns were addressed.
Mirvaso Pregnancy And Lactation Text: This medication has not been assigned a Pregnancy Risk Category. It is unknown if the medication is excreted in breast milk.
Zyclara Counseling:  I discussed with the patient the risks of imiquimod including but not limited to erythema, scaling, itching, weeping, crusting, and pain.  Patient understands that the inflammatory response to imiquimod is variable from person to person and was educated regarded proper titration schedule.  If flu-like symptoms develop, patient knows to discontinue the medication and contact us.
Thalidomide Counseling: I discussed with the patient the risks of thalidomide including but not limited to birth defects, anxiety, weakness, chest pain, dizziness, cough and severe allergy.
Minocycline Counseling: Patient advised regarding possible photosensitivity and discoloration of the teeth, skin, lips, tongue and gums.  Patient instructed to avoid sunlight, if possible.  When exposed to sunlight, patients should wear protective clothing, sunglasses, and sunscreen.  The patient was instructed to call the office immediately if the following severe adverse effects occur:  hearing changes, easy bruising/bleeding, severe headache, or vision changes.  The patient verbalized understanding of the proper use and possible adverse effects of minocycline.  All of the patient's questions and concerns were addressed.
Terbinafine Pregnancy And Lactation Text: This medication is Pregnancy Category B and is considered safe during pregnancy. It is also excreted in breast milk and breast feeding isn't recommended.
Benzoyl Peroxide Counseling: Patient counseled that medicine may cause skin irritation and bleach clothing.  In the event of skin irritation, the patient was advised to reduce the amount of the drug applied or use it less frequently.   The patient verbalized understanding of the proper use and possible adverse effects of benzoyl peroxide.  All of the patient's questions and concerns were addressed.
Cyclophosphamide Counseling:  I discussed with the patient the risks of cyclophosphamide including but not limited to hair loss, hormonal abnormalities, decreased fertility, abdominal pain, diarrhea, nausea and vomiting, bone marrow suppression and infection. The patient understands that monitoring is required while taking this medication.
Benzoyl Peroxide Pregnancy And Lactation Text: This medication is Pregnancy Category C. It is unknown if benzoyl peroxide is excreted in breast milk.
Glycopyrrolate Counseling:  I discussed with the patient the risks of glycopyrrolate including but not limited to skin rash, drowsiness, dry mouth, difficulty urinating, and blurred vision.
Include Pregnancy/Lactation Warning?: No
Picato Counseling:  I discussed with the patient the risks of Picato including but not limited to erythema, scaling, itching, weeping, crusting, and pain.
Simponi Counseling:  I discussed with the patient the risks of golimumab including but not limited to myelosuppression, immunosuppression, autoimmune hepatitis, demyelinating diseases, lymphoma, and serious infections.  The patient understands that monitoring is required including a PPD at baseline and must alert us or the primary physician if symptoms of infection or other concerning signs are noted.
Tranexamic Acid Counseling:  Patient advised of the small risk of bleeding problems with tranexamic acid. They were also instructed to call if they developed any nausea, vomiting or diarrhea. All of the patient's questions and concerns were addressed.
Minocycline Pregnancy And Lactation Text: This medication is Pregnancy Category D and not consider safe during pregnancy. It is also excreted in breast milk.
Cimzia Counseling:  I discussed with the patient the risks of Cimzia including but not limited to immunosuppression, allergic reactions and infections.  The patient understands that monitoring is required including a PPD at baseline and must alert us or the primary physician if symptoms of infection or other concerning signs are noted.
Carac Counseling:  I discussed with the patient the risks of Carac including but not limited to erythema, scaling, itching, weeping, crusting, and pain.
Glycopyrrolate Pregnancy And Lactation Text: This medication is Pregnancy Category B and is considered safe during pregnancy. It is unknown if it is excreted breast milk.
Quinolones Counseling:  I discussed with the patient the risks of fluoroquinolones including but not limited to GI upset, allergic reaction, drug rash, diarrhea, dizziness, photosensitivity, yeast infections, liver function test abnormalities, tendonitis/tendon rupture.
Cyclophosphamide Pregnancy And Lactation Text: This medication is Pregnancy Category D and it isn't considered safe during pregnancy. This medication is excreted in breast milk.
Cimetidine Counseling:  I discussed with the patient the risks of Cimetidine including but not limited to gynecomastia, headache, diarrhea, nausea, drowsiness, arrhythmias, pancreatitis, skin rashes, psychosis, bone marrow suppression and kidney toxicity.
Opioid Counseling: I discussed with the patient the potential side effects of opioids including but not limited to addiction, altered mental status, and depression. I stressed avoiding alcohol, benzodiazepines, muscle relaxants and sleep aids unless specifically okayed by a physician. The patient verbalized understanding of the proper use and possible adverse effects of opioids. All of the patient's questions and concerns were addressed. They were instructed to flush the remaining pills down the toilet if they did not need them for pain.
Cyclosporine Counseling:  I discussed with the patient the risks of cyclosporine including but not limited to hypertension, gingival hyperplasia,myelosuppression, immunosuppression, liver damage, kidney damage, neurotoxicity, lymphoma, and serious infections. The patient understands that monitoring is required including baseline blood pressure, CBC, CMP, lipid panel and uric acid, and then 1-2 times monthly CMP and blood pressure.
Tranexamic Acid Pregnancy And Lactation Text: It is unknown if this medication is safe during pregnancy or breast feeding.
Skyrizi Counseling: I discussed with the patient the risks of risankizumab-rzaa including but not limited to immunosuppression, and serious infections.  The patient understands that monitoring is required including a PPD at baseline and must alert us or the primary physician if symptoms of infection or other concerning signs are noted.
Hydroxychloroquine Counseling:  I discussed with the patient that a baseline ophthalmologic exam is needed at the start of therapy and every year thereafter while on therapy. A CBC may also be warranted for monitoring.  The side effects of this medication were discussed with the patient, including but not limited to agranulocytosis, aplastic anemia, seizures, rashes, retinopathy, and liver toxicity. Patient instructed to call the office should any adverse effect occur.  The patient verbalized understanding of the proper use and possible adverse effects of Plaquenil.  All the patient's questions and concerns were addressed.
Cimzia Pregnancy And Lactation Text: This medication crosses the placenta but can be considered safe in certain situations. Cimzia may be excreted in breast milk.
Protopic Counseling: Patient may experience a mild burning sensation during topical application. Protopic is not approved in children less than 2 years of age. There have been case reports of hematologic and skin malignancies in patients using topical calcineurin inhibitors although causality is questionable.
Valtrex Counseling: I discussed with the patient the risks of valacyclovir including but not limited to kidney damage, nausea, vomiting and severe allergy.  The patient understands that if the infection seems to be worsening or is not improving, they are to call.
Doxepin Counseling:  Patient advised that the medication is sedating and not to drive a car after taking this medication. Patient informed of potential adverse effects including but not limited to dry mouth, urinary retention, and blurry vision.  The patient verbalized understanding of the proper use and possible adverse effects of doxepin.  All of the patient's questions and concerns were addressed.
Opioid Pregnancy And Lactation Text: These medications can lead to premature delivery and should be avoided during pregnancy. These medications are also present in breast milk in small amounts.
Cyclosporine Pregnancy And Lactation Text: This medication is Pregnancy Category C and it isn't know if it is safe during pregnancy. This medication is excreted in breast milk.
5-Fu Counseling: 5-Fluorouracil Counseling:  I discussed with the patient the risks of 5-fluorouracil including but not limited to erythema, scaling, itching, weeping, crusting, and pain.
Hydroxychloroquine Pregnancy And Lactation Text: This medication has been shown to cause fetal harm but it isn't assigned a Pregnancy Risk Category. There are small amounts excreted in breast milk.
Cosentyx Counseling:  I discussed with the patient the risks of Cosentyx including but not limited to worsening of Crohn's disease, immunosuppression, allergic reactions and infections.  The patient understands that monitoring is required including a PPD at baseline and must alert us or the primary physician if symptoms of infection or other concerning signs are noted.
Valtrex Pregnancy And Lactation Text: this medication is Pregnancy Category B and is considered safe during pregnancy. This medication is not directly found in breast milk but it's metabolite acyclovir is present.
Protopic Pregnancy And Lactation Text: This medication is Pregnancy Category C. It is unknown if this medication is excreted in breast milk when applied topically.
Rifampin Counseling: I discussed with the patient the risks of rifampin including but not limited to liver damage, kidney damage, red-orange body fluids, nausea/vomiting and severe allergy.
Niacinamide Counseling: I recommended taking niacin or niacinamide, also know as vitamin B3, twice daily. Recent evidence suggests that taking vitamin B3 (500 mg twice daily) can reduce the risk of actinic keratoses and non-melanoma skin cancers. Side effects of vitamin B3 include flushing and headache.
Stelara Counseling:  I discussed with the patient the risks of ustekinumab including but not limited to immunosuppression, malignancy, posterior leukoencephalopathy syndrome, and serious infections.  The patient understands that monitoring is required including a PPD at baseline and must alert us or the primary physician if symptoms of infection or other concerning signs are noted.
Rifampin Pregnancy And Lactation Text: This medication is Pregnancy Category C and it isn't know if it is safe during pregnancy. It is also excreted in breast milk and should not be used if you are breast feeding.
Rhofade Counseling: Rhofade is a topical medication which can decrease superficial blood flow where applied. Side effects are uncommon and include stinging, redness and allergic reactions.
Doxepin Pregnancy And Lactation Text: This medication is Pregnancy Category C and it isn't known if it is safe during pregnancy. It is also excreted in breast milk and breast feeding isn't recommended.
Azithromycin Counseling:  I discussed with the patient the risks of azithromycin including but not limited to GI upset, allergic reaction, drug rash, diarrhea, and yeast infections.
Methotrexate Counseling:  Patient counseled regarding adverse effects of methotrexate including but not limited to nausea, vomiting, abnormalities in liver function tests. Patients may develop mouth sores, rash, diarrhea, and abnormalities in blood counts. The patient understands that monitoring is required including LFT's and blood counts.  There is a rare possibility of scarring of the liver and lung problems that can occur when taking methotrexate. Persistent nausea, loss of appetite, pale stools, dark urine, cough, and shortness of breath should be reported immediately. Patient advised to discontinue methotrexate treatment at least three months before attempting to become pregnant.  I discussed the need for folate supplements while taking methotrexate.  These supplements can decrease side effects during methotrexate treatment. The patient verbalized understanding of the proper use and possible adverse effects of methotrexate.  All of the patient's questions and concerns were addressed.
Methotrexate Pregnancy And Lactation Text: This medication is Pregnancy Category X and is known to cause fetal harm. This medication is excreted in breast milk.
Arava Counseling:  Patient counseled regarding adverse effects of Arava including but not limited to nausea, vomiting, abnormalities in liver function tests. Patients may develop mouth sores, rash, diarrhea, and abnormalities in blood counts. The patient understands that monitoring is required including LFTs and blood counts.  There is a rare possibility of scarring of the liver and lung problems that can occur when taking methotrexate. Persistent nausea, loss of appetite, pale stools, dark urine, cough, and shortness of breath should be reported immediately. Patient advised to discontinue Arava treatment and consult with a physician prior to attempting conception. The patient will have to undergo a treatment to eliminate Arava from the body prior to conception.
Hydroxyzine Counseling: Patient advised that the medication is sedating and not to drive a car after taking this medication.  Patient informed of potential adverse effects including but not limited to dry mouth, urinary retention, and blurry vision.  The patient verbalized understanding of the proper use and possible adverse effects of hydroxyzine.  All of the patient's questions and concerns were addressed.
Tetracycline Counseling: Patient counseled regarding possible photosensitivity and increased risk for sunburn.  Patient instructed to avoid sunlight, if possible.  When exposed to sunlight, patients should wear protective clothing, sunglasses, and sunscreen.  The patient was instructed to call the office immediately if the following severe adverse effects occur:  hearing changes, easy bruising/bleeding, severe headache, or vision changes.  The patient verbalized understanding of the proper use and possible adverse effects of tetracycline.  All of the patient's questions and concerns were addressed. Patient understands to avoid pregnancy while on therapy due to potential birth defects.
Azithromycin Pregnancy And Lactation Text: This medication is considered safe during pregnancy and is also secreted in breast milk.
Drysol Counseling:  I discussed with the patient the risks of drysol/aluminum chloride including but not limited to skin rash, itching, irritation, burning.
Niacinamide Pregnancy And Lactation Text: These medications are considered safe during pregnancy.
Dupixent Counseling: I discussed with the patient the risks of dupilumab including but not limited to eye infection and irritation, cold sores, injection site reactions, worsening of asthma, allergic reactions and increased risk of parasitic infection.  Live vaccines should be avoided while taking dupilumab. Dupilumab will also interact with certain medications such as warfarin and cyclosporine. The patient understands that monitoring is required and they must alert us or the primary physician if symptoms of infection or other concerning signs are noted.
Hydroxyzine Pregnancy And Lactation Text: This medication is not safe during pregnancy and should not be taken. It is also excreted in breast milk and breast feeding isn't recommended.
Prednisone Counseling:  I discussed with the patient the risks of prolonged use of prednisone including but not limited to weight gain, insomnia, osteoporosis, mood changes, diabetes, susceptibility to infection, glaucoma and high blood pressure.  In cases where prednisone use is prolonged, patients should be monitored with blood pressure checks, serum glucose levels and an eye exam.  Additionally, the patient may need to be placed on GI prophylaxis, PCP prophylaxis, and calcium and vitamin D supplementation and/or a bisphosphonate.  The patient verbalized understanding of the proper use and the possible adverse effects of prednisone.  All of the patient's questions and concerns were addressed.
Nsaids Counseling: NSAID Counseling: I discussed with the patient that NSAIDs should be taken with food. Prolonged use of NSAIDs can result in the development of stomach ulcers.  Patient advised to stop taking NSAIDs if abdominal pain occurs.  The patient verbalized understanding of the proper use and possible adverse effects of NSAIDs.  All of the patient's questions and concerns were addressed.
Dupixent Pregnancy And Lactation Text: This medication likely crosses the placenta but the risk for the fetus is uncertain. This medication is excreted in breast milk.
Drysol Pregnancy And Lactation Text: This medication is considered safe during pregnancy and breast feeding.
Bactrim Counseling:  I discussed with the patient the risks of sulfa antibiotics including but not limited to GI upset, allergic reaction, drug rash, diarrhea, dizziness, photosensitivity, and yeast infections.  Rarely, more serious reactions can occur including but not limited to aplastic anemia, agranulocytosis, methemoglobinemia, blood dyscrasias, liver or kidney failure, lung infiltrates or desquamative/blistering drug rashes.
Solaraze Counseling:  I discussed with the patient the risks of Solaraze including but not limited to erythema, scaling, itching, weeping, crusting, and pain.
Taltz Counseling: I discussed with the patient the risks of ixekizumab including but not limited to immunosuppression, serious infections, worsening of inflammatory bowel disease and drug reactions.  The patient understands that monitoring is required including a PPD at baseline and must alert us or the primary physician if symptoms of infection or other concerning signs are noted.
Enbrel Counseling:  I discussed with the patient the risks of etanercept including but not limited to myelosuppression, immunosuppression, autoimmune hepatitis, demyelinating diseases, lymphoma, and infections.  The patient understands that monitoring is required including a PPD at baseline and must alert us or the primary physician if symptoms of infection or other concerning signs are noted.
Elidel Counseling: Patient may experience a mild burning sensation during topical application. Elidel is not approved in children less than 2 years of age. There have been case reports of hematologic and skin malignancies in patients using topical calcineurin inhibitors although causality is questionable.
Solaraze Pregnancy And Lactation Text: This medication is Pregnancy Category B and is considered safe. There is some data to suggest avoiding during the third trimester. It is unknown if this medication is excreted in breast milk.
Clofazimine Counseling:  I discussed with the patient the risks of clofazimine including but not limited to skin and eye pigmentation, liver damage, nausea/vomiting, gastrointestinal bleeding and allergy.
Nsaids Pregnancy And Lactation Text: These medications are considered safe up to 30 weeks gestation. It is excreted in breast milk.
Bactrim Pregnancy And Lactation Text: This medication is Pregnancy Category D and is known to cause fetal risk.  It is also excreted in breast milk.

## 2020-01-23 ENCOUNTER — OFFICE VISIT (OUTPATIENT)
Dept: PULMONOLOGY | Facility: HOSPICE | Age: 70
End: 2020-01-23
Payer: MEDICARE

## 2020-01-23 VITALS
TEMPERATURE: 96.6 F | RESPIRATION RATE: 16 BRPM | OXYGEN SATURATION: 92 % | HEIGHT: 62 IN | HEART RATE: 88 BPM | SYSTOLIC BLOOD PRESSURE: 126 MMHG | BODY MASS INDEX: 37.17 KG/M2 | WEIGHT: 202 LBS | DIASTOLIC BLOOD PRESSURE: 84 MMHG

## 2020-01-23 DIAGNOSIS — J47.9 BRONCHIECTASIS WITHOUT COMPLICATION (HCC): ICD-10-CM

## 2020-01-23 DIAGNOSIS — E66.9 CLASS 2 OBESITY WITH BODY MASS INDEX (BMI) OF 36.0 TO 36.9 IN ADULT, UNSPECIFIED OBESITY TYPE, UNSPECIFIED WHETHER SERIOUS COMORBIDITY PRESENT: ICD-10-CM

## 2020-01-23 DIAGNOSIS — K45.8 RECURRENT ABDOMINAL HERNIA WITHOUT OBSTRUCTION OR GANGRENE, UNSPECIFIED HERNIA TYPE: ICD-10-CM

## 2020-01-23 DIAGNOSIS — E84.9 CYSTIC FIBROSIS (HCC): ICD-10-CM

## 2020-01-23 DIAGNOSIS — Z23 NEED FOR VACCINATION: ICD-10-CM

## 2020-01-23 PROCEDURE — 90662 IIV NO PRSV INCREASED AG IM: CPT | Performed by: INTERNAL MEDICINE

## 2020-01-23 PROCEDURE — 99214 OFFICE O/P EST MOD 30 MIN: CPT | Mod: 25 | Performed by: INTERNAL MEDICINE

## 2020-01-23 PROCEDURE — G0008 ADMIN INFLUENZA VIRUS VAC: HCPCS | Performed by: INTERNAL MEDICINE

## 2020-01-23 ASSESSMENT — ENCOUNTER SYMPTOMS
EYE DISCHARGE: 0
FEVER: 0
SHORTNESS OF BREATH: 0
EYE PAIN: 0
PHOTOPHOBIA: 0
CLAUDICATION: 0
ORTHOPNEA: 0
CONSTIPATION: 0
PALPITATIONS: 0
DIAPHORESIS: 0
NECK PAIN: 0
COUGH: 1
HEMOPTYSIS: 0
WEIGHT LOSS: 0
NAUSEA: 0
TREMORS: 0
DIARRHEA: 0
STRIDOR: 0
VOMITING: 0
ABDOMINAL PAIN: 1
BACK PAIN: 0
EYE REDNESS: 0
WHEEZING: 0
BLURRED VISION: 0
SORE THROAT: 0
WEAKNESS: 0
FOCAL WEAKNESS: 0
HEARTBURN: 0
HEADACHES: 0
DIZZINESS: 0
SPEECH CHANGE: 0
CHILLS: 0
DEPRESSION: 0
PND: 0
DOUBLE VISION: 0
SINUS PAIN: 0
SPUTUM PRODUCTION: 1
FALLS: 0
MYALGIAS: 0

## 2020-01-23 NOTE — PROGRESS NOTES
Chief Complaint   Patient presents with   • Follow-Up     last seen 7/23/19          HPI: This patient is a 69 y.o. female whom is followed in our clinic for adult onset CF c/b chronic bronchiectasis also followed by [facility] last seen by me on 7/23/19.  The pt has an adult-diagnosis of Cystic Fibrosis, genotype [id number]/R117H+5Tcis, positive sweat chloride test of 99 on 11/29/17 with multisystem involvement including pancreatic insufficiency. 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  She is also growing mucoid Pseudomonas and MSSA. She has been advised to follow a regimen of Pulmozyme, inhaled Tobramycin, Acapella therapy, Xopenex nebs as well as nebulized saline and chest vest therapy for airway clearance.  The pt has not been compliant with all therapies due to time needed and relative lack of sxs.  The patient was last seen in chest clinic at [facility] for CF on June 6 with stable spirometry with FEV1 of 1.41 L or 65% predicted.  Full pulmonary function testing from 2017 shows normal DLCO mild airflow obstruction with borderline bronchodilator response and mild air trapping.  The patient has not been treated for acute exacerbation of her bronchiectasis since our last visit.  She sees [facility] next week for routine spirometry, sputum eval and follow-up.  More recently she has a recurrent abdominal hernia for which she is seeing surgery next week and anticipating need for pulmonary clearance.  She is not doing airway clearance regimens regularly but denies worsening shortness of breath or change in her daily cough.  Cough is productive and typically mucus is easy to clear.  Some days are worse than others.  She denies fevers, chills, night sweats, weight loss.  No chest pain.  She is currently wearing abdominal binder and does have abdominal pain related to her hernia.    Past Medical History:    Diagnosis Date   • Bronchitis    • Cancer (HCC)     skin ca   • Coughing blood     intermittent cough unsure if allergies   • Heart burn    • Indigestion    • Other specified disorder of intestines     diverticuli   • Other specified symptom associated with female genital organs     pelvic prolapse   • Pain     intestines   • Pneumonia 2015       Social History     Socioeconomic History   • Marital status:      Spouse name: Not on file   • Number of children: Not on file   • Years of education: Not on file   • Highest education level: Not on file   Occupational History   • Not on file   Social Needs   • Financial resource strain: Not on file   • Food insecurity:     Worry: Not on file     Inability: Not on file   • Transportation needs:     Medical: Not on file     Non-medical: Not on file   Tobacco Use   • Smoking status: Former Smoker     Packs/day: 1.00     Years: 20.00     Pack years: 20.00     Types: Cigarettes     Start date: 1964     Last attempt to quit: 1984     Years since quittin.0   • Smokeless tobacco: Never Used   Substance and Sexual Activity   • Alcohol use: Yes     Alcohol/week: 3.0 oz     Types: 5 Glasses of wine per week     Comment: 5 per week   • Drug use: No   • Sexual activity: Not on file   Lifestyle   • Physical activity:     Days per week: Not on file     Minutes per session: Not on file   • Stress: Not on file   Relationships   • Social connections:     Talks on phone: Not on file     Gets together: Not on file     Attends [employer] service: Not on file     Active member of club or organization: Not on file     Attends meetings of clubs or organizations: Not on file     Relationship status: Not on file   • Intimate partner violence:     Fear of current or ex partner: Not on file     Emotionally abused: Not on file     Physically abused: Not on file     Forced sexual activity: Not on file   Other Topics Concern   • Not on file   Social History Narrative   •  Not on file       Family History   Problem Relation Age of Onset   • Cancer Mother    • Alcohol abuse Father    • Cancer Daughter        Current Outpatient Medications on File Prior to Visit   Medication Sig Dispense Refill   • Omeprazole Magnesium (PRILOSEC OTC PO) Take  by mouth.     • calcium carbonate (TUMS) 500 MG Chew Tab Take 500 mg by mouth every day.     • alendronate (FOSAMAX) 70 MG Tab TAKE ONE TABLET BY MOUTH ONCE A WEEK     • Calcium Carb-Cholecalciferol (OYSCO 500 + D) 500-200 MG-UNIT Tab Take 1 tablet by mouth.     • CHOLECALCIFEROL PO Take 5,000 Units by mouth.     • glucose blood (ONE TOUCH ULTRA TEST) strip 1 Strip by Other route.     • ONE TOUCH ULTRASOFT LANCETS Misc 1 Each by Other route.     • Blood Glucose Monitoring Suppl (ONE TOUCH ULTRA 2) w/Device Kit U UTD TO TEST BLOOD SUGAR  0   • glucose blood (ONE TOUCH ULTRA TEST) strip 1 Each by Other route.     • Multiple Vitamin (MULTI-VITAMIN DAILY PO) Take 10,000 Units by mouth.     • ONE TOUCH ULTRASOFT LANCETS Misc 1 Each by Other route.     • Pancrelipase, Lip-Prot-Amyl, (CREON) 85567 units Cap DR Particles Take 2 tabs PO w/ meals TID & 1 tab PO w/ snacks TID, 9 cap/day, 270 caps per month, & 810 caps per 90 days, please disp 90 days if possible     • Cholecalciferol (VITAMIN D PO) Take  by mouth.     • diazepam (VALIUM) 2 MG Tab      • hydrocodone-acetaminophen (NORCO) 5-325 MG Tab per tablet Take 1-2 Tabs by mouth every 6 hours as needed. 30 Tab 0   • levoFLOXacin (LEVAQUIN) 500 MG tablet Take 1.5 Tabs by mouth every day. (Patient not taking: Reported on 6/3/2019) 11 Tab 0   • alendronate (FOSAMAX) 70 MG Tab      • benzonatate (TESSALON) 100 MG Cap      • CALCIUM CARBONATE ANTACID PO Take  by mouth.     • ciprofloxacin (CIPRO) 750 MG Tab      • Ivacaftor (KALYDECO) 150 MG Tab Take 1 tablet by mouth.     • levoFLOXacin (LEVAQUIN) 750 MG tablet      • oseltamivir (TAMIFLU) 75 MG Cap      • sucralfate (CARAFATE) 1 GM Tab Take 1 g by mouth.      • Tezacaftor-Ivacaftor&Ivacaftor (SYMDEKO) 100-150 & 150 MG Tablet Therapy Pack Take 1 tablet by mouth.     • Pancrelipase, Lip-Prot-Amyl, (CREON) 68518 units Cap DR Particles TAKE 2 CAPSULES BY MOUTH WITH MEALS THREE TIMES DAILY AND 1 CAPSULE BY MOUTH WITH SNACKS THREE TIMES DAILY, 9 CAPSULES PER DAY     • dornase alpha (PULMOZYME) 1 MG/ML Solution 2.51 mg by Nebulization route.     • HYDROcodone-acetaminophen (NORCO) 5-325 MG Tab per tablet Take 1-2 tablet by mouth.     • levalbuterol (XOPENEX) 1.25 MG/3ML Nebu Soln USE 1 VIAL VIA NEBULIZER FOUR TIMES DAILY PRN     • levalbuterol (XOPENEX HFA) 45 MCG/ACT inhaler 2 Puffs by Nebulization route.     • PULMOZYME 1 MG/ML Solution VVN D  1   • sodium chloride (HYPER-SAL) 7 % Nebu Soln VVN BID  1   • HYDROcodone-acetaminophen (NORCO) 5-325 MG Tab per tablet Take  by mouth.     • levalbuterol (XOPENEX HFA) 45 MCG/ACT inhaler 2 Puffs by Nebulization route.     • levalbuterol (XOPENEX) 1.25 MG/3ML Nebu Soln 3 mL by Nebulization route every four hours as needed for Shortness of Breath (Cough, Wheezing.). (Patient not taking: Reported on 5/15/2019) 75 mL 11   • levalbuterol (XOPENEX HFA) 45 MCG/ACT inhaler Inhale 2 Puffs by mouth every four hours as needed for Shortness of Breath. (Patient not taking: Reported on 5/15/2019) 1 Inhaler 11   • albuterol (PROVENTIL) 2.5mg/3ml Nebu Soln solution for nebulization 3 mL by Nebulization route every four hours as needed for Shortness of Breath. (Patient not taking: Reported on 5/23/2019) 75 mL 11   • sterile water SOLN 2.1 mL with sodium chloride 4 mEq/mL SOLN 3.6 mEq 3 mL by Nebulization route 3 times a day. (Patient not taking: Reported on 1/23/2020) 90 Ampule 11   • albuterol (PROAIR HFA) 108 (90 Base) MCG/ACT Aero Soln inhalation aerosol Inhale 2 Puffs by mouth every four hours as needed for Shortness of Breath (wheezing). (Patient not taking: Reported on 5/23/2019) 1 Inhaler 11   • oxycodone-acetaminophen (PERCOCET) 7.5-325 MG  "per tablet Take 1-2 Tabs by mouth every four hours as needed for Moderate Pain. (Patient not taking: Reported on 9/15/2017) 40 Tab 0   • cephALEXin (KEFLEX) 500 MG Cap Take 1 Cap by mouth every 6 hours. (Patient not taking: Reported on 6/3/2019) 40 Cap 0   • Pancrelipase, Lip-Prot-Amyl, 09828 UNITS Cap DR Particles Take  by mouth every day.     • CHOLESTYRAMINE PO Take  by mouth every day.       No current facility-administered medications on file prior to visit.        Albuterol; Ambien [zolpidem]; Bee venom; Flagyl [metronidazole]; Sulfa drugs; and Tape      ROS:   Review of Systems   Constitutional: Negative for chills, diaphoresis, fever, malaise/fatigue and weight loss.   HENT: Negative for congestion, ear discharge, ear pain, hearing loss, nosebleeds, sinus pain, sore throat and tinnitus.    Eyes: Negative for blurred vision, double vision, photophobia, pain, discharge and redness.   Respiratory: Positive for cough and sputum production. Negative for hemoptysis, shortness of breath, wheezing and stridor.    Cardiovascular: Negative for chest pain, palpitations, orthopnea, claudication, leg swelling and PND.   Gastrointestinal: Positive for abdominal pain. Negative for constipation, diarrhea, heartburn, nausea and vomiting.   Genitourinary: Negative for dysuria and urgency.   Musculoskeletal: Negative for back pain, falls, joint pain, myalgias and neck pain.   Skin: Negative for itching and rash.   Neurological: Negative for dizziness, tremors, speech change, focal weakness, weakness and headaches.   Endo/Heme/Allergies: Negative for environmental allergies.   Psychiatric/Behavioral: Negative for depression.       /84 (BP Location: Left arm, Patient Position: Sitting, BP Cuff Size: Large adult)   Pulse 88   Temp 35.9 °C (96.6 °F) (Temporal)   Resp 16   Ht 1.575 m (5' 2\")   Wt 91.6 kg (202 lb)   SpO2 92%   Physical Exam  Constitutional:       General: She is not in acute distress.     Appearance: " Normal appearance. She is well-developed. She is obese.   HENT:      Head: Normocephalic and atraumatic.      Right Ear: External ear normal.      Left Ear: External ear normal.      Nose: Nose normal. No congestion.      Mouth/Throat:      Mouth: Mucous membranes are moist.      Pharynx: Oropharynx is clear. No oropharyngeal exudate.   Eyes:      General: No scleral icterus.     Extraocular Movements: Extraocular movements intact.      Conjunctiva/sclera: Conjunctivae normal.      Pupils: Pupils are equal, round, and reactive to light.   Neck:      Musculoskeletal: Neck supple.      Vascular: No JVD.      Trachea: No tracheal deviation.   Cardiovascular:      Rate and Rhythm: Normal rate and regular rhythm.      Heart sounds: Normal heart sounds. No murmur. No friction rub. No gallop.    Pulmonary:      Effort: Pulmonary effort is normal. No accessory muscle usage or respiratory distress.      Breath sounds: No wheezing or rales.      Comments: Bibasilar crackles   Abdominal:      General: There is no distension.      Palpations: Abdomen is soft.      Tenderness: There is no tenderness.   Musculoskeletal: Normal range of motion.         General: No tenderness or deformity.      Right lower leg: No edema.      Left lower leg: No edema.   Lymphadenopathy:      Cervical: No cervical adenopathy.   Skin:     General: Skin is warm and dry.      Findings: No rash.      Nails: There is no clubbing.     Neurological:      Mental Status: She is alert and oriented to person, place, and time.      Cranial Nerves: No cranial nerve deficit.      Gait: Gait normal.   Psychiatric:         Mood and Affect: Mood normal.         Behavior: Behavior normal.         PFTs as reviewed by me personally: as per HPI    Imaging as reviewed by me personally:  As per HPI    Assessment:  1. Bronchiectasis without complication (HCC)     2. Cystic fibrosis (HCC)     3. Recurrent abdominal hernia without obstruction or gangrene, unspecified hernia  type     4. Class 2 obesity with body mass index (BMI) of 36.0 to 36.9 in adult, unspecified obesity type, unspecified whether serious comorbidity present         Plan:  1.  This is secondary to adult onset CF.  She is followed by chest clinic at [facility] and has plans to see them next week.  Given likely need for upcoming surgery to repair abdominal hernia we discussed complying with her airway clearance regimen to prevent her already increased risk of pneumonia.  Advised the patient to at least do Acapella valve 3 times per day and ideally chest vest with nebulized bronchodilators.  I did not order spirometry given she will likely get this done at [facility] however if it is needed prior to her visit for operative clearance I am happy to order that.  2.  This is adult onset and she is followed at chest medicine clinic.  She is only intermittently compliant with recommended therapies which we discussed again today and the importance of preventing infection.  She will follow-up with [facility] next week.  3.  Patient with recurrent abdominal hernia.  She is at an increased risk for pulmonary complications perioperatively due to her underlying bronchiectasis.  I advised the patient to start regular mucus clearance with nebulized bronchodilator therapy, chest vest and or Acapella valve at least 2 times but preferably 3 times a day in the days preceding her operation.  She will have follow-up spirometry at [facility] however if needed sooner I will order.  I also recommend continuing airway clearance with chest vest therapy and or Acapella valve if chest vest is not tolerated postoperatively and nebulized bronchodilators preferably with need albuterol every 4-6 hours postoperatively.  Otherwise I would recommend proceeding with surgery if indicated.  4.  This does put patient at risk for obesity related comorbidities affecting the pulmonary system such as gastroesophageal reflux disease and obstructive sleep apnea.  No  current symptoms to suggest either.  I encouraged healthy lifestyle habits.  Return in about 6 months (around 7/23/2020) for bronchiectasis, CF.

## 2020-02-10 ENCOUNTER — HOSPITAL ENCOUNTER (OUTPATIENT)
Dept: RADIOLOGY | Facility: MEDICAL CENTER | Age: 70
End: 2020-02-10
Attending: SURGERY
Payer: MEDICARE

## 2020-02-10 DIAGNOSIS — R10.9 ABDOMINAL PAIN, UNSPECIFIED ABDOMINAL LOCATION: ICD-10-CM

## 2020-02-10 PROCEDURE — 74177 CT ABD & PELVIS W/CONTRAST: CPT

## 2020-02-10 PROCEDURE — 700117 HCHG RX CONTRAST REV CODE 255: Performed by: SURGERY

## 2020-02-10 RX ADMIN — IOHEXOL 25 ML: 240 INJECTION, SOLUTION INTRATHECAL; INTRAVASCULAR; INTRAVENOUS; ORAL at 11:23

## 2020-02-10 RX ADMIN — IOHEXOL 100 ML: 350 INJECTION, SOLUTION INTRAVENOUS at 11:21

## 2020-02-24 DIAGNOSIS — Z01.810 PRE-OPERATIVE CARDIOVASCULAR EXAMINATION: ICD-10-CM

## 2020-02-24 PROCEDURE — 93005 ELECTROCARDIOGRAM TRACING: CPT

## 2020-02-24 PROCEDURE — 93010 ELECTROCARDIOGRAM REPORT: CPT | Performed by: INTERNAL MEDICINE

## 2020-02-24 RX ORDER — HYDROCODONE BITARTRATE AND ACETAMINOPHEN 5; 325 MG/1; MG/1
1 TABLET ORAL
COMMUNITY

## 2020-02-25 LAB — EKG IMPRESSION: NORMAL

## 2020-02-26 ENCOUNTER — HOSPITAL ENCOUNTER (OUTPATIENT)
Facility: MEDICAL CENTER | Age: 70
End: 2020-02-26
Attending: SURGERY | Admitting: SURGERY
Payer: MEDICARE

## 2020-02-26 ENCOUNTER — ANESTHESIA (OUTPATIENT)
Dept: SURGERY | Facility: MEDICAL CENTER | Age: 70
End: 2020-02-26
Payer: MEDICARE

## 2020-02-26 ENCOUNTER — ANESTHESIA EVENT (OUTPATIENT)
Dept: SURGERY | Facility: MEDICAL CENTER | Age: 70
End: 2020-02-26
Payer: MEDICARE

## 2020-02-26 VITALS
HEART RATE: 90 BPM | HEIGHT: 62 IN | WEIGHT: 205.03 LBS | OXYGEN SATURATION: 93 % | DIASTOLIC BLOOD PRESSURE: 87 MMHG | TEMPERATURE: 97.8 F | SYSTOLIC BLOOD PRESSURE: 133 MMHG | RESPIRATION RATE: 14 BRPM | BODY MASS INDEX: 37.73 KG/M2

## 2020-02-26 DIAGNOSIS — G89.18 POSTOPERATIVE PAIN: ICD-10-CM

## 2020-02-26 LAB — GLUCOSE BLD-MCNC: 94 MG/DL (ref 65–99)

## 2020-02-26 PROCEDURE — 160009 HCHG ANES TIME/MIN: Performed by: SURGERY

## 2020-02-26 PROCEDURE — 700101 HCHG RX REV CODE 250: Performed by: ANESTHESIOLOGY

## 2020-02-26 PROCEDURE — A6402 STERILE GAUZE <= 16 SQ IN: HCPCS | Performed by: SURGERY

## 2020-02-26 PROCEDURE — 700101 HCHG RX REV CODE 250: Performed by: SURGERY

## 2020-02-26 PROCEDURE — 160025 RECOVERY II MINUTES (STATS): Performed by: SURGERY

## 2020-02-26 PROCEDURE — A9270 NON-COVERED ITEM OR SERVICE: HCPCS | Performed by: ANESTHESIOLOGY

## 2020-02-26 PROCEDURE — 500868 HCHG NEEDLE, SURGI(VARES): Performed by: SURGERY

## 2020-02-26 PROCEDURE — 501664 HCHG TUBING, FILTER STRYKER: Performed by: SURGERY

## 2020-02-26 PROCEDURE — 160035 HCHG PACU - 1ST 60 MINS PHASE I: Performed by: SURGERY

## 2020-02-26 PROCEDURE — 160042 HCHG SURGERY MINUTES - EA ADDL 1 MIN LEVEL 5: Performed by: SURGERY

## 2020-02-26 PROCEDURE — C1781 MESH (IMPLANTABLE): HCPCS | Performed by: SURGERY

## 2020-02-26 PROCEDURE — 160048 HCHG OR STATISTICAL LEVEL 1-5: Performed by: SURGERY

## 2020-02-26 PROCEDURE — 160046 HCHG PACU - 1ST 60 MINS PHASE II: Performed by: SURGERY

## 2020-02-26 PROCEDURE — 82962 GLUCOSE BLOOD TEST: CPT

## 2020-02-26 PROCEDURE — 700111 HCHG RX REV CODE 636 W/ 250 OVERRIDE (IP): Performed by: ANESTHESIOLOGY

## 2020-02-26 PROCEDURE — 700102 HCHG RX REV CODE 250 W/ 637 OVERRIDE(OP): Performed by: ANESTHESIOLOGY

## 2020-02-26 PROCEDURE — 700105 HCHG RX REV CODE 258: Performed by: SURGERY

## 2020-02-26 PROCEDURE — 502714 HCHG ROBOTIC SURGERY SERVICES: Performed by: SURGERY

## 2020-02-26 PROCEDURE — 500064 HCHG BINDER, 4-PANEL MED/LG: Performed by: SURGERY

## 2020-02-26 PROCEDURE — 160002 HCHG RECOVERY MINUTES (STAT): Performed by: SURGERY

## 2020-02-26 PROCEDURE — 160031 HCHG SURGERY MINUTES - 1ST 30 MINS LEVEL 5: Performed by: SURGERY

## 2020-02-26 PROCEDURE — 501838 HCHG SUTURE GENERAL: Performed by: SURGERY

## 2020-02-26 DEVICE — MESH VENTRALIGHT ST 6X8 1EA/CA: Type: IMPLANTABLE DEVICE | Site: ABDOMEN | Status: FUNCTIONAL

## 2020-02-26 RX ORDER — METOCLOPRAMIDE HYDROCHLORIDE 5 MG/ML
INJECTION INTRAMUSCULAR; INTRAVENOUS PRN
Status: DISCONTINUED | OUTPATIENT
Start: 2020-02-26 | End: 2020-02-26 | Stop reason: SURG

## 2020-02-26 RX ORDER — ALUMINUM ZIRCONIUM TRICHLOROHYDREX GLY 0.19 G/G
20 STICK TOPICAL DAILY
COMMUNITY

## 2020-02-26 RX ORDER — MAGNESIUM HYDROXIDE 1200 MG/15ML
LIQUID ORAL
Status: DISCONTINUED | OUTPATIENT
Start: 2020-02-26 | End: 2020-02-26 | Stop reason: HOSPADM

## 2020-02-26 RX ORDER — ONDANSETRON 2 MG/ML
INJECTION INTRAMUSCULAR; INTRAVENOUS PRN
Status: DISCONTINUED | OUTPATIENT
Start: 2020-02-26 | End: 2020-02-26 | Stop reason: SURG

## 2020-02-26 RX ORDER — DIPHENHYDRAMINE HYDROCHLORIDE 50 MG/ML
12.5 INJECTION INTRAMUSCULAR; INTRAVENOUS
Status: DISCONTINUED | OUTPATIENT
Start: 2020-02-26 | End: 2020-02-26 | Stop reason: HOSPADM

## 2020-02-26 RX ORDER — ROCURONIUM BROMIDE 10 MG/ML
INJECTION, SOLUTION INTRAVENOUS PRN
Status: DISCONTINUED | OUTPATIENT
Start: 2020-02-26 | End: 2020-02-26 | Stop reason: SURG

## 2020-02-26 RX ORDER — MIDAZOLAM HYDROCHLORIDE 1 MG/ML
INJECTION INTRAMUSCULAR; INTRAVENOUS PRN
Status: DISCONTINUED | OUTPATIENT
Start: 2020-02-26 | End: 2020-02-26 | Stop reason: SURG

## 2020-02-26 RX ORDER — DEXAMETHASONE SODIUM PHOSPHATE 4 MG/ML
INJECTION, SOLUTION INTRA-ARTICULAR; INTRALESIONAL; INTRAMUSCULAR; INTRAVENOUS; SOFT TISSUE PRN
Status: DISCONTINUED | OUTPATIENT
Start: 2020-02-26 | End: 2020-02-26 | Stop reason: SURG

## 2020-02-26 RX ORDER — KETOROLAC TROMETHAMINE 30 MG/ML
INJECTION, SOLUTION INTRAMUSCULAR; INTRAVENOUS PRN
Status: DISCONTINUED | OUTPATIENT
Start: 2020-02-26 | End: 2020-02-26 | Stop reason: SURG

## 2020-02-26 RX ORDER — HYDRALAZINE HYDROCHLORIDE 20 MG/ML
5 INJECTION INTRAMUSCULAR; INTRAVENOUS
Status: DISCONTINUED | OUTPATIENT
Start: 2020-02-26 | End: 2020-02-26 | Stop reason: HOSPADM

## 2020-02-26 RX ORDER — OXYCODONE HCL 10 MG/1
10 TABLET, FILM COATED, EXTENDED RELEASE ORAL ONCE
Status: COMPLETED | OUTPATIENT
Start: 2020-02-26 | End: 2020-02-26

## 2020-02-26 RX ORDER — SODIUM CHLORIDE, SODIUM LACTATE, POTASSIUM CHLORIDE, CALCIUM CHLORIDE 600; 310; 30; 20 MG/100ML; MG/100ML; MG/100ML; MG/100ML
INJECTION, SOLUTION INTRAVENOUS CONTINUOUS
Status: DISCONTINUED | OUTPATIENT
Start: 2020-02-26 | End: 2020-02-26 | Stop reason: HOSPADM

## 2020-02-26 RX ORDER — FLUTICASONE PROPIONATE 44 UG/1
2 AEROSOL, METERED RESPIRATORY (INHALATION) ONCE
Status: COMPLETED | OUTPATIENT
Start: 2020-02-26 | End: 2020-02-26

## 2020-02-26 RX ORDER — HYDROCODONE BITARTRATE AND ACETAMINOPHEN 5; 325 MG/1; MG/1
1-2 TABLET ORAL EVERY 6 HOURS PRN
Qty: 25 TAB | Refills: 0 | Status: SHIPPED | OUTPATIENT
Start: 2020-02-26 | End: 2020-03-02

## 2020-02-26 RX ORDER — DOCUSATE SODIUM 100 MG/1
100 CAPSULE, LIQUID FILLED ORAL
COMMUNITY
Start: 2020-02-13

## 2020-02-26 RX ORDER — MAGNESIUM SULFATE HEPTAHYDRATE 40 MG/ML
INJECTION, SOLUTION INTRAVENOUS PRN
Status: DISCONTINUED | OUTPATIENT
Start: 2020-02-26 | End: 2020-02-26 | Stop reason: SURG

## 2020-02-26 RX ORDER — HYDROMORPHONE HYDROCHLORIDE 2 MG/ML
INJECTION, SOLUTION INTRAMUSCULAR; INTRAVENOUS; SUBCUTANEOUS PRN
Status: DISCONTINUED | OUTPATIENT
Start: 2020-02-26 | End: 2020-02-26 | Stop reason: SURG

## 2020-02-26 RX ORDER — BUPIVACAINE HYDROCHLORIDE AND EPINEPHRINE 5; 5 MG/ML; UG/ML
INJECTION, SOLUTION EPIDURAL; INTRACAUDAL; PERINEURAL
Status: DISCONTINUED | OUTPATIENT
Start: 2020-02-26 | End: 2020-02-26 | Stop reason: HOSPADM

## 2020-02-26 RX ORDER — ELEXACAFTOR, TEZACAFTOR, AND IVACAFTOR 100-50-75
KIT ORAL
COMMUNITY
Start: 2020-02-04

## 2020-02-26 RX ORDER — HYDROMORPHONE HYDROCHLORIDE 1 MG/ML
0.1 INJECTION, SOLUTION INTRAMUSCULAR; INTRAVENOUS; SUBCUTANEOUS
Status: DISCONTINUED | OUTPATIENT
Start: 2020-02-26 | End: 2020-02-26 | Stop reason: HOSPADM

## 2020-02-26 RX ORDER — LABETALOL HYDROCHLORIDE 5 MG/ML
5 INJECTION, SOLUTION INTRAVENOUS
Status: DISCONTINUED | OUTPATIENT
Start: 2020-02-26 | End: 2020-02-26 | Stop reason: HOSPADM

## 2020-02-26 RX ORDER — ONDANSETRON 2 MG/ML
4 INJECTION INTRAMUSCULAR; INTRAVENOUS
Status: DISCONTINUED | OUTPATIENT
Start: 2020-02-26 | End: 2020-02-26 | Stop reason: HOSPADM

## 2020-02-26 RX ORDER — CEFAZOLIN SODIUM 1 G/3ML
INJECTION, POWDER, FOR SOLUTION INTRAMUSCULAR; INTRAVENOUS PRN
Status: DISCONTINUED | OUTPATIENT
Start: 2020-02-26 | End: 2020-02-26 | Stop reason: SURG

## 2020-02-26 RX ORDER — HALOPERIDOL 5 MG/ML
1 INJECTION INTRAMUSCULAR
Status: DISCONTINUED | OUTPATIENT
Start: 2020-02-26 | End: 2020-02-26 | Stop reason: HOSPADM

## 2020-02-26 RX ORDER — ACETAMINOPHEN 500 MG
1000 TABLET ORAL ONCE
Status: COMPLETED | OUTPATIENT
Start: 2020-02-26 | End: 2020-02-26

## 2020-02-26 RX ORDER — HYDROMORPHONE HYDROCHLORIDE 1 MG/ML
0.2 INJECTION, SOLUTION INTRAMUSCULAR; INTRAVENOUS; SUBCUTANEOUS
Status: DISCONTINUED | OUTPATIENT
Start: 2020-02-26 | End: 2020-02-26 | Stop reason: HOSPADM

## 2020-02-26 RX ORDER — HYDROMORPHONE HYDROCHLORIDE 1 MG/ML
0.4 INJECTION, SOLUTION INTRAMUSCULAR; INTRAVENOUS; SUBCUTANEOUS
Status: DISCONTINUED | OUTPATIENT
Start: 2020-02-26 | End: 2020-02-26 | Stop reason: HOSPADM

## 2020-02-26 RX ORDER — KETAMINE HYDROCHLORIDE 50 MG/ML
INJECTION, SOLUTION INTRAMUSCULAR; INTRAVENOUS PRN
Status: DISCONTINUED | OUTPATIENT
Start: 2020-02-26 | End: 2020-02-26 | Stop reason: SURG

## 2020-02-26 RX ADMIN — KETAMINE HYDROCHLORIDE 50 MG: 50 INJECTION INTRAMUSCULAR; INTRAVENOUS at 12:22

## 2020-02-26 RX ADMIN — FENTANYL CITRATE 25 MCG: 0.05 INJECTION, SOLUTION INTRAMUSCULAR; INTRAVENOUS at 14:10

## 2020-02-26 RX ADMIN — METOCLOPRAMIDE 10 MG: 5 INJECTION, SOLUTION INTRAMUSCULAR; INTRAVENOUS at 11:29

## 2020-02-26 RX ADMIN — FENTANYL CITRATE 25 MCG: 0.05 INJECTION, SOLUTION INTRAMUSCULAR; INTRAVENOUS at 14:06

## 2020-02-26 RX ADMIN — MAGNESIUM SULFATE IN WATER 4 G: 40 INJECTION, SOLUTION INTRAVENOUS at 12:05

## 2020-02-26 RX ADMIN — OXYCODONE HYDROCHLORIDE 10 MG: 10 TABLET, FILM COATED, EXTENDED RELEASE ORAL at 11:16

## 2020-02-26 RX ADMIN — PROPOFOL 200 MG: 10 INJECTION, EMULSION INTRAVENOUS at 12:03

## 2020-02-26 RX ADMIN — HYDROMORPHONE HYDROCHLORIDE 1 MG: 2 INJECTION, SOLUTION INTRAMUSCULAR; INTRAVENOUS; SUBCUTANEOUS at 12:03

## 2020-02-26 RX ADMIN — LIDOCAINE HYDROCHLORIDE 0.5 ML: 10 INJECTION, SOLUTION EPIDURAL; INFILTRATION; INTRACAUDAL; PERINEURAL at 10:52

## 2020-02-26 RX ADMIN — CEFAZOLIN 2 G: 330 INJECTION, POWDER, FOR SOLUTION INTRAMUSCULAR; INTRAVENOUS at 12:03

## 2020-02-26 RX ADMIN — ROCURONIUM BROMIDE 50 MG: 10 INJECTION, SOLUTION INTRAVENOUS at 12:03

## 2020-02-26 RX ADMIN — KETOROLAC TROMETHAMINE 30 MG: 30 INJECTION, SOLUTION INTRAMUSCULAR at 12:25

## 2020-02-26 RX ADMIN — ONDANSETRON 4 MG: 2 INJECTION INTRAMUSCULAR; INTRAVENOUS at 12:18

## 2020-02-26 RX ADMIN — ACETAMINOPHEN 1000 MG: 500 TABLET ORAL at 11:16

## 2020-02-26 RX ADMIN — FLUTICASONE PROPIONATE 2 PUFF: 44 AEROSOL, METERED RESPIRATORY (INHALATION) at 12:18

## 2020-02-26 RX ADMIN — MIDAZOLAM HYDROCHLORIDE 2 MG: 1 INJECTION, SOLUTION INTRAMUSCULAR; INTRAVENOUS at 11:56

## 2020-02-26 RX ADMIN — DEXAMETHASONE SODIUM PHOSPHATE 4 MG: 4 INJECTION, SOLUTION INTRA-ARTICULAR; INTRALESIONAL; INTRAMUSCULAR; INTRAVENOUS; SOFT TISSUE at 12:03

## 2020-02-26 RX ADMIN — SODIUM CHLORIDE, POTASSIUM CHLORIDE, SODIUM LACTATE AND CALCIUM CHLORIDE: 600; 310; 30; 20 INJECTION, SOLUTION INTRAVENOUS at 11:15

## 2020-02-26 SDOH — HEALTH STABILITY: MENTAL HEALTH: HOW OFTEN DO YOU HAVE A DRINK CONTAINING ALCOHOL?: 4 OR MORE TIMES A WEEK

## 2020-02-26 SDOH — HEALTH STABILITY: MENTAL HEALTH: HOW MANY STANDARD DRINKS CONTAINING ALCOHOL DO YOU HAVE ON A TYPICAL DAY?: 1 OR 2

## 2020-02-26 SDOH — HEALTH STABILITY: MENTAL HEALTH: HOW OFTEN DO YOU HAVE 6 OR MORE DRINKS ON ONE OCCASION?: LESS THAN MONTHLY

## 2020-02-26 ASSESSMENT — PAIN SCALES - GENERAL: PAIN_LEVEL: 2

## 2020-02-26 NOTE — DISCHARGE INSTR - OTHER INFO
Discharge home when alert, comfortable, ambulatory, and tolerating PO well.  Pt counseled re: diet, activity, home med's, and wound care.  Regular diet.  May shower over Tegaderms tomorrow.   Ok to remove Tegaderms 3/01/20. May continue to shower once bandages removed, but no baths/soaks x 2 weeks.  No driving for 4-5 days.  No lifting >15 lbs for 4-6 weeks.  Wear abdominal binder at all times except for showers  F/U with Dr. Ganser in 1-2 weeks

## 2020-02-26 NOTE — DISCHARGE INSTRUCTIONS
ACTIVITY: Rest and take it easy for the first 24 hours.  A responsible adult is recommended to remain with you during that time.  It is normal to feel sleepy.  We encourage you to not do anything that requires balance, judgment or coordination.    MILD FLU-LIKE SYMPTOMS ARE NORMAL. YOU MAY EXPERIENCE GENERALIZED MUSCLE ACHES, THROAT IRRITATION, HEADACHE AND/OR SOME NAUSEA.    FOR 24 HOURS DO NOT:  Drive, operate machinery or run household appliances.  Drink beer or alcoholic beverages.   Make important decisions or sign legal documents.    SPECIAL INSTRUCTIONS: **  Pt counseled re: diet, activity, home medications, and wound care-(Follow instructions given to you by Dr. Ganser.)  Regular diet.  No driving for 4-5 days.  No lifting >15 lbs for 4-6 weeks.  Wear abdominal binder at all times except for showers  Follow up with Dr. Ganser in 1-2 weeks*    DIET: To avoid nausea, slowly advance diet as tolerated, avoiding spicy or greasy foods for the first day.  Add more substantial food to your diet according to your physician's instructions.  Babies can be fed formula or breast milk as soon as they are hungry.  INCREASE FLUIDS AND FIBER TO AVOID CONSTIPATION.    SURGICAL DRESSING/BATHING:   May shower over Tegaderms tomorrow.   Ok to remove Tegaderms 3/01/20. May continue to shower once bandages removed, but no baths/soaks x 2 weeks.      FOLLOW-UP APPOINTMENT:  A follow-up appointment should be arranged with your doctor. Call to schedule #[phone].    You should CALL YOUR PHYSICIAN if you develop:  Fever greater than 101 degrees F.  Pain not relieved by medication, or persistent nausea or vomiting.  Excessive bleeding (blood soaking through dressing) or unexpected drainage from the wound.  Extreme redness or swelling around the incision site, drainage of pus or foul smelling drainage.  Inability to urinate or empty your bladder within 8 hours.  Problems with breathing or chest pain.    You should call 911 if you  develop problems with breathing or chest pain.  If you are unable to contact your doctor or surgical center, you should go to the nearest emergency room or urgent care center.  Physician's telephone #: **[phone].    If any questions arise, call your doctor.  If your doctor is not available, please feel free to call the Surgical Center at [phone].  The Center is open Monday through Friday from 7AM to 7PM.  You can also call the HEALTH HOTLINE open 24 hours/day, 7 days/week and speak to a nurse at [phone], or toll free at [phone].    A registered nurse may call you a few days after your surgery to see how you are doing after your procedure.    MEDICATIONS: Resume taking daily medication.  Take prescribed pain medication with food.  If no medication is prescribed, you may take non-aspirin pain medication if needed.  PAIN MEDICATION CAN BE VERY CONSTIPATING.  Take a stool softener or laxative such as senokot, pericolace, or milk of magnesia if needed.    Prescription given for **Norco*.  Last pain medication given at **11:15am.*.    If your physician has prescribed pain medication that includes Acetaminophen (Tylenol), do not take additional Acetaminophen (Tylenol) while taking the prescribed medication.    Depression / Suicide Risk    As you are discharged from this [facility] facility, it is important to learn how to keep safe from harming yourself.    Recognize the warning signs:  · Abrupt changes in personality, positive or negative- including increase in energy   · Giving away possessions  · Change in eating patterns- significant weight changes-  positive or negative  · Change in sleeping patterns- unable to sleep or sleeping all the time   · Unwillingness or inability to communicate  · Depression  · Unusual sadness, discouragement and loneliness  · Talk of wanting to die  · Neglect of personal appearance   · Rebelliousness- reckless behavior  · Withdrawal from people/activities they  love  · Confusion- inability to concentrate     If you or a loved one observes any of these behaviors or has concerns about self-harm, here's what you can do:  · Talk about it- your feelings and reasons for harming yourself  · Remove any means that you might use to hurt yourself (examples: pills, rope, extension cords, firearm)  · Get professional help from the community (Mental Health, Substance Abuse, psychological counseling)  · Do not be alone:Call your Safe Contact- someone whom you trust who will be there for you.  · Call your local CRISIS HOTLINE 184-9479 or 599-647-0173  · Call your local Children's Mobile Crisis Response Team [place] (283) 775-0362 or www.OOgave  · Call the toll free National Suicide Prevention Hotlines   · National Suicide Prevention Lifeline 329-657-EHQF (7823)  · National Hope Line Network 800-SUICIDE (367-9735)

## 2020-02-26 NOTE — OR NURSING
1535-Pt discharged home with family via wheelchair escort to car.  Discharge instructions and Rx given to pt and family.  Pt and family verbalized understanding.  Abdominal incision x 1 and abdominal lap stabs sites x 3 with gauze/tegederm clean,dry and intact.  Abdominal binder in place.  Pt up and ambulated to bathroom.  Pt voided without difficulty and gait steady.  Pt states pain level 6/10 and that was a tolerable level for her to go home.  Pt denies nausea.  No s/s of distress noted at time of discharge.

## 2020-02-26 NOTE — ANESTHESIA PROCEDURE NOTES
Airway  Date/Time: 2/26/2020 12:04 PM  Performed by: Rogerio Nunes M.D.  Authorized by: Rogerio Nunes M.D.     Location:  OR  Urgency:  Elective  Difficult Airway: No    Indications for Airway Management:  Anesthesia  Spontaneous Ventilation: absent    Sedation Level:  Deep  Preoxygenated: Yes    Patient Position:  Sniffing  MILS Maintained Throughout: No    Mask Difficulty Assessment:  1 - vent by mask  Final Airway Type:  Endotracheal airway  Final Endotracheal Airway:  ETT  Cuffed: Yes    Technique Used for Successful ETT Placement:  Direct laryngoscopy  Insertion Site:  Oral  Blade Type:  Salazar  Laryngoscope Blade/Videolaryngoscope Blade Size:  3  ETT Size (mm):  7.5  Measured from:  Teeth  ETT to Teeth (cm):  19  Placement Verified by: auscultation and capnometry    Cormack-Lehane Classification:  Grade IIb - view of arytenoids or posterior of glottis only  Number of Attempts at Approach:  1

## 2020-02-26 NOTE — ANESTHESIA PREPROCEDURE EVALUATION
69 female for robotic ventral incisional hernia repair  DM  GERD  Cystic fibrosis  SOB  Bronchitis  Former smoker  Intolerances to albuterol, ambien, flagyl, sulfa, tape    EKG Sinus rhythm    Relevant Problems   Other   (+) Attention to colostomy (HCC)   (+) Diverticulitis of colon   (+) Ventral hernia       Physical Exam    Airway   Mallampati: II  TM distance: >3 FB  Neck ROM: full       Cardiovascular - normal exam  Rhythm: regular  Rate: normal  (-) murmur     Dental    Pulmonary - normal exam  (+) wheezes     Abdominal    Neurological - normal exam         Other findings: Missing teeth, none loose  End exp wheeze left >right          Anesthesia Plan    ASA 3   ASA physical status 3 criteria: COPD    Plan - general       Airway plan will be ETT        Induction: intravenous    Postoperative Plan: Postoperative administration of opioids is intended.    Pertinent diagnostic labs and testing reviewed    Informed Consent:    Anesthetic plan and risks discussed with patient.    Use of blood products discussed with: patient whom consented to blood products.

## 2020-02-26 NOTE — OR SURGEON
Immediate Post OP Note    PreOp Diagnosis: Recurrent incisional hernia    PostOp Diagnosis: Same    Procedure(s):  REPAIR, HERNIA, VENTRAL, ROBOT-ASSISTED, USING DA JEREMY XI- FOR RECURRENT REDUCIBLE INCISIONAL HERNIA WITH MESH - Wound Class: Clean    Surgeon(s):  John H Ganser, M.D.    Anesthesiologist/Type of Anesthesia:  Anesthesiologist: Rogerio Nunes M.D./General    Surgical Staff:  Circulator: Jesus Alvarado R.N.  Relief Circulator: Carmen Mcgee R.N.  Relief Scrub: Aliyah Cervantes  Scrub Person: Yael Gross R.N.  First Assist: Jose Raul Arauz PCurtACurt    Specimens removed if any:  * No specimens in log *    Estimated Blood Loss: 0    Findings: 0    Complications: 0        2/26/2020 1:42 PM John H Ganser, M.D.

## 2020-02-26 NOTE — ANESTHESIA TIME REPORT
Anesthesia Start and Stop Event Times     Date Time Event    2/26/2020 1108 Ready for Procedure     1156 Anesthesia Start     1354 Anesthesia Stop        Responsible Staff  02/26/20    Name Role Begin End    Rogerio Nunes M.D. Anesth 1156 1354        Preop Diagnosis (Free Text):  Pre-op Diagnosis     RECURRENT VENTRAL INCISIONAL HERNIA        Preop Diagnosis (Codes):    Post op Diagnosis  Recurrent ventral incisional hernia      Premium Reason  Non-Premium    Comments:

## 2020-02-26 NOTE — OR NURSING
Vss. A&Ox4. Pt medicated for pain. C/o pain 6/10 - states it is a tolerable pain and she would rather be at home where she knows she would be more comfortable.   4 surgical sites to abd - all under abd binder - all CDI.   Denies nausea.

## 2020-02-26 NOTE — ANESTHESIA POSTPROCEDURE EVALUATION
Patient: Betina Petit    Procedure Summary     Date:  02/26/20 Room / Location:  [place] [address] / SURGERY [facility]    Anesthesia Start:  1156 Anesthesia Stop:  1354    Procedure:  REPAIR, HERNIA, VENTRAL, ROBOT-ASSISTED, USING DA JEREMY XI- FOR RECURRENT REDUCIBLE INCISIONAL HERNIA WITH MESH (N/A Abdomen) Diagnosis:  (RECURRENT VENTRAL INCISIONAL HERNIA)    Surgeon:  John H Ganser, M.D. Responsible Provider:  Rogerio Nunes M.D.    Anesthesia Type:  general ASA Status:  3          Final Anesthesia Type: general  Last vitals  BP   Blood Pressure : (!) 164/99    Temp   36.3 °C (97.4 °F)    Pulse   Pulse: 83   Resp   14    SpO2   93 %      Anesthesia Post Evaluation    Patient location during evaluation: PACU  Patient participation: complete - patient participated  Level of consciousness: awake  Pain score: 2    Airway patency: patent  Anesthetic complications: no  Cardiovascular status: hemodynamically stable  Respiratory status: acceptable  Hydration status: euvolemic    PONV: none           Nurse Pain Score: 0 (NPRS)

## 2020-02-26 NOTE — OP REPORT
DATE OF SERVICE:  02/26/2020    PREOPERATIVE DIAGNOSIS:  Recurrent incisional hernia, reducible.    POSTOPERATIVE DIAGNOSIS:  Recurrent incisional hernia, reducible.    PROCEDURE:  Da Waylon robotic repair of recurrent incisional hernia with mesh,   15x20 cm Ventralight ST.    SURGEON:  John H. Ganser, MD    ASSISTANT:  Jose Raul Arauz PA-C    ANESTHESIA:  General.    ANESTHESIOLOGIST:  Rogerio Nunes MD    INDICATIONS:  Patient is a 69-year-old female with multiple prior abdominal   operations and colostomy.  She developed recurrent bulge confirmed on CT scan   with repair of multiple defects in the midline in the upper abdomen.  Risks,   benefits and alternatives to robotic repair with mesh were outlined in detail.    Questions answered and she wished to proceed.    DESCRIPTION OF PROCEDURE:  Patient was identified and general anesthetic   administered.  Her abdomen was prepped and draped in the usual sterile   fashion.  Local anesthesia of 0.5% Marcaine with epinephrine was injected   prior to making skin incision.  Small incision was made in the left lateral   subcostal region and the Veress needle passed.  The abdomen was insufflated   with carbon dioxide without incident and a 12 mm robotic trocar and camera   inserted.  There were extensive midline adhesions.  The left lateral abdomen   was clear.  Two 8 mm robotic trocars were placed in left lateral abdomen   appropriately spaced.  The robot was docked and instruments inserted.    Adhesiolysis was carried out, taking down all omental adhesions.  There was   some diastasis in the lower abdomen.  In the upper abdomen, there were   multiple defects and multiple sutures bridging these defects.  The dominant   hernia in the upper midline was reduced including the hernia sac.  All hernias   were reduced and dissection carried above dividing the falciform ligament to   accommodate mesh.    Starting from above, the fascia closed over the hernias with a running 0    Stratafix barbed suture.  Starting from the bottom, another suture was run.  A   third suture was used to overlap these two, and under low insufflation   pressure, the sutures were all drawn tight and the fascia came together nicely   bringing the rectus muscles back together.  The needles were cut and removed.    A 15x20 cm Ventralight ST mesh was then inserted and secured to the   posterior abdominal wall under low insufflation pressure with a running 3-0   Stratafix suture maintaining the mesh nice and flat against the fascia.  These   needles were removed.  Hemostasis was assured.  The trocars were withdrawn.    The abdomen deflated and incisions closed with Vicryl.  The patient had a   chronically scarred ostomy site in the left lower quadrant.  This was excised   sharply and hemostasis assured.  The subcutaneous tissue was approximated with   0 Vicryl and skin with a running 4-0 Vicryl subcuticular suture.  Sterile   dressings were applied and the patient returned to recovery room in stable   condition.       ____________________________________     JOHN H. GANSER, MD [facility] / [facility]    DD:  02/26/2020 13:47:00  DT:  02/26/2020 15:03:53    D#:  [id number]  Job#:  [id number]    cc: LISS CHEN MD

## 2020-06-30 ENCOUNTER — APPOINTMENT (RX ONLY)
Dept: URBAN - METROPOLITAN AREA CLINIC 4 | Facility: CLINIC | Age: 70
Setting detail: DERMATOLOGY
End: 2020-06-30

## 2020-06-30 DIAGNOSIS — L57.8 OTHER SKIN CHANGES DUE TO CHRONIC EXPOSURE TO NONIONIZING RADIATION: ICD-10-CM

## 2020-06-30 DIAGNOSIS — L81.4 OTHER MELANIN HYPERPIGMENTATION: ICD-10-CM

## 2020-06-30 DIAGNOSIS — D22 MELANOCYTIC NEVI: ICD-10-CM

## 2020-06-30 DIAGNOSIS — Z85.828 PERSONAL HISTORY OF OTHER MALIGNANT NEOPLASM OF SKIN: ICD-10-CM

## 2020-06-30 DIAGNOSIS — L82.1 OTHER SEBORRHEIC KERATOSIS: ICD-10-CM

## 2020-06-30 DIAGNOSIS — Z71.89 OTHER SPECIFIED COUNSELING: ICD-10-CM

## 2020-06-30 DIAGNOSIS — L57.0 ACTINIC KERATOSIS: ICD-10-CM

## 2020-06-30 DIAGNOSIS — D18.0 HEMANGIOMA: ICD-10-CM

## 2020-06-30 DIAGNOSIS — L30.4 ERYTHEMA INTERTRIGO: ICD-10-CM

## 2020-06-30 PROBLEM — D18.01 HEMANGIOMA OF SKIN AND SUBCUTANEOUS TISSUE: Status: ACTIVE | Noted: 2020-06-30

## 2020-06-30 PROBLEM — D48.5 NEOPLASM OF UNCERTAIN BEHAVIOR OF SKIN: Status: ACTIVE | Noted: 2020-06-30

## 2020-06-30 PROBLEM — D22.5 MELANOCYTIC NEVI OF TRUNK: Status: ACTIVE | Noted: 2020-06-30

## 2020-06-30 PROCEDURE — 99214 OFFICE O/P EST MOD 30 MIN: CPT | Mod: 25

## 2020-06-30 PROCEDURE — ? LIQUID NITROGEN

## 2020-06-30 PROCEDURE — 11102 TANGNTL BX SKIN SINGLE LES: CPT | Mod: 59

## 2020-06-30 PROCEDURE — ? BIOPSY BY SHAVE METHOD

## 2020-06-30 PROCEDURE — 17004 DESTROY PREMAL LESIONS 15/>: CPT

## 2020-06-30 PROCEDURE — ? MEDICATION COUNSELING

## 2020-06-30 PROCEDURE — ? COUNSELING

## 2020-06-30 PROCEDURE — ? ADDITIONAL NOTES

## 2020-06-30 PROCEDURE — ? PRESCRIPTION

## 2020-06-30 RX ORDER — KETOCONAZOLE 20 MG/G
CREAM TOPICAL
Qty: 1 | Refills: 3 | Status: ERX

## 2020-06-30 ASSESSMENT — LOCATION SIMPLE DESCRIPTION DERM
LOCATION SIMPLE: RIGHT HAND
LOCATION SIMPLE: RIGHT CHEEK
LOCATION SIMPLE: RIGHT BREAST
LOCATION SIMPLE: LEFT CHEEK
LOCATION SIMPLE: UPPER BACK
LOCATION SIMPLE: LEFT BREAST
LOCATION SIMPLE: LEFT HAND
LOCATION SIMPLE: RIGHT LOWER BACK
LOCATION SIMPLE: LEFT FOREARM
LOCATION SIMPLE: LEFT LOWER BACK
LOCATION SIMPLE: CHEST
LOCATION SIMPLE: RIGHT FOREHEAD
LOCATION SIMPLE: RIGHT FOREARM

## 2020-06-30 ASSESSMENT — LOCATION DETAILED DESCRIPTION DERM
LOCATION DETAILED: MIDDLE STERNUM
LOCATION DETAILED: LEFT MEDIAL BREAST 6-7:00 REGION
LOCATION DETAILED: LEFT INFERIOR PREAURICULAR CHEEK
LOCATION DETAILED: LEFT LATERAL SUPERIOR CHEST
LOCATION DETAILED: STERNAL NOTCH
LOCATION DETAILED: LEFT RADIAL DORSAL HAND
LOCATION DETAILED: LEFT INFERIOR CENTRAL MALAR CHEEK
LOCATION DETAILED: RIGHT DISTAL DORSAL FOREARM
LOCATION DETAILED: RIGHT SUPERIOR LATERAL MALAR CHEEK
LOCATION DETAILED: RIGHT PROXIMAL RADIAL DORSAL FOREARM
LOCATION DETAILED: RIGHT FOREHEAD
LOCATION DETAILED: RIGHT INFERIOR MEDIAL MALAR CHEEK
LOCATION DETAILED: RIGHT RADIAL DORSAL HAND
LOCATION DETAILED: RIGHT PROXIMAL DORSAL FOREARM
LOCATION DETAILED: LEFT INFERIOR MEDIAL MIDBACK
LOCATION DETAILED: RIGHT MEDIAL SUPERIOR CHEST
LOCATION DETAILED: UPPER STERNUM
LOCATION DETAILED: RIGHT INFERIOR CENTRAL MALAR CHEEK
LOCATION DETAILED: RIGHT SUPERIOR MEDIAL MIDBACK
LOCATION DETAILED: LEFT PROXIMAL DORSAL FOREARM
LOCATION DETAILED: LEFT DISTAL DORSAL FOREARM
LOCATION DETAILED: INFERIOR THORACIC SPINE
LOCATION DETAILED: RIGHT ULNAR DORSAL HAND
LOCATION DETAILED: RIGHT LATERAL BREAST 6-7:00 REGION

## 2020-06-30 ASSESSMENT — LOCATION ZONE DERM
LOCATION ZONE: FACE
LOCATION ZONE: HAND
LOCATION ZONE: ARM
LOCATION ZONE: TRUNK

## 2020-06-30 NOTE — PROCEDURE: BIOPSY BY SHAVE METHOD
Detail Level: Detailed
Depth Of Biopsy: dermis
Was A Bandage Applied: Yes
Size Of Lesion In Cm: 0.9
X Size Of Lesion In Cm: 0
Biopsy Type: H and E
Biopsy Method: 15 blade
Anesthesia Type: 1% lidocaine with epinephrine
Hemostasis: Aluminum Chloride and Electrocautery
Wound Care: Aquaphor
Dressing: Band-Aid
Destruction After The Procedure: No
Type Of Destruction Used: Curettage
Curettage Text: The wound bed was treated with curettage after the biopsy was performed.
Cryotherapy Text: The wound bed was treated with cryotherapy after the biopsy was performed.
Electrodesiccation Text: The wound bed was treated with electrodesiccation after the biopsy was performed.
Electrodesiccation And Curettage Text: The wound bed was treated with electrodesiccation and curettage after the biopsy was performed.
Silver Nitrate Text: The wound bed was treated with silver nitrate after the biopsy was performed.
Lab: [id number]
Lab Facility: 
Consent: Written consent was obtained and risks were reviewed including but not limited to scarring, infection, bleeding, scabbing, incomplete removal, nerve damage and allergy to anesthesia.
Post-Care Instructions: I reviewed with the patient in detail post-care instructions. Patient is to keep the biopsy site dry overnight, and then apply bacitracin twice daily until healed. Patient may apply hydrogen peroxide soaks to remove any crusting.
Notification Instructions: Patient will be notified of biopsy results. However, patient instructed to call the office if not contacted within 2 weeks.
Billing Type: Third-Party Bill
Information: Selecting Yes will display possible errors in your note based on the variables you have selected. This validation is only offered as a suggestion for you. PLEASE NOTE THAT THE VALIDATION TEXT WILL BE REMOVED WHEN YOU FINALIZE YOUR NOTE. IF YOU WANT TO FAX A PRELIMINARY NOTE YOU WILL NEED TO TOGGLE THIS TO 'NO' IF YOU DO NOT WANT IT IN YOUR FAXED NOTE.

## 2020-06-30 NOTE — PROCEDURE: ADDITIONAL NOTES
Additional Notes: Will prescribe Ketoconazole and send Rx to pharmacy. \\nExplained in detail how to apply anti-fungal cream BID x14 days.
Detail Level: Simple

## 2020-07-02 NOTE — PROCEDURE: LIQUID NITROGEN
02-Jul-2020
Detail Level: Detailed
Post-Care Instructions: I reviewed with the patient in detail post-care instructions. Patient is to wear sunprotection, and avoid picking at any of the treated lesions. Pt may apply Vaseline to crusted or scabbing areas.
Consent: The patient's consent was obtained including but not limited to risks of crusting, scabbing, blistering, scarring, darker or lighter pigmentary change, recurrence, incomplete removal and infection.
Render Post-Care Instructions In Note?: no
Number Of Freeze-Thaw Cycles: 2 freeze-thaw cycles
Duration Of Freeze Thaw-Cycle (Seconds): 3
Number Of Freeze-Thaw Cycles: 3 freeze-thaw cycles
Medical Necessity Information: It is in your best interest to select a reason for this procedure from the list below. All of these items fulfill various CMS LCD requirements except the new and changing color options.
Medical Necessity Clause: This procedure was medically necessary because the lesions that were treated were:

## 2020-07-23 ENCOUNTER — APPOINTMENT (OUTPATIENT)
Dept: PULMONOLOGY | Facility: HOSPICE | Age: 70
End: 2020-07-23
Payer: MEDICARE

## 2020-10-09 ENCOUNTER — APPOINTMENT (OUTPATIENT)
Dept: PULMONOLOGY | Facility: HOSPICE | Age: 70
End: 2020-10-09
Payer: MEDICARE

## 2020-11-16 ENCOUNTER — TELEPHONE (OUTPATIENT)
Dept: SLEEP MEDICINE | Facility: MEDICAL CENTER | Age: 70
End: 2020-11-16

## 2020-11-16 NOTE — TELEPHONE ENCOUNTER
Caller: Betina    Phone number: [phone].    Message: Pt called and lm. She had a CXR at [facility] 2 days ago.  They wanted her to see a Pulmonary Doctor. Dr Keen is her pharmacy.       When entering pt's chart. Pt is scheduled to see Dr Keen on 11/18/2020.  The appt was made today.

## 2020-11-18 ENCOUNTER — OFFICE VISIT (OUTPATIENT)
Dept: SLEEP MEDICINE | Facility: MEDICAL CENTER | Age: 70
End: 2020-11-18
Payer: MEDICARE

## 2020-11-18 VITALS
SYSTOLIC BLOOD PRESSURE: 134 MMHG | HEIGHT: 62 IN | BODY MASS INDEX: 39.01 KG/M2 | DIASTOLIC BLOOD PRESSURE: 78 MMHG | RESPIRATION RATE: 16 BRPM | WEIGHT: 212 LBS | TEMPERATURE: 97.3 F | HEART RATE: 87 BPM | OXYGEN SATURATION: 95 %

## 2020-11-18 DIAGNOSIS — R06.02 SOB (SHORTNESS OF BREATH): ICD-10-CM

## 2020-11-18 DIAGNOSIS — E66.9 CLASS 2 OBESITY WITH BODY MASS INDEX (BMI) OF 38.0 TO 38.9 IN ADULT, UNSPECIFIED OBESITY TYPE, UNSPECIFIED WHETHER SERIOUS COMORBIDITY PRESENT: ICD-10-CM
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
## 2020-11-18 DIAGNOSIS — E84.9 CYSTIC FIBROSIS (HCC): ICD-10-CM

## 2020-11-18 PROCEDURE — 99214 OFFICE O/P EST MOD 30 MIN: CPT | Performed by: INTERNAL MEDICINE

## 2020-11-18 SDOH — HEALTH STABILITY: MENTAL HEALTH: HOW OFTEN DO YOU HAVE A DRINK CONTAINING ALCOHOL?: MONTHLY OR LESS

## 2020-11-18 ASSESSMENT — ENCOUNTER SYMPTOMS
COUGH: 0
PALPITATIONS: 0
SPUTUM PRODUCTION: 0
BACK PAIN: 0
HEMOPTYSIS: 0
FEVER: 0
MYALGIAS: 0
DIAPHORESIS: 0
WEAKNESS: 0
DEPRESSION: 0
SINUS PAIN: 0
DIZZINESS: 0
SORE THROAT: 0
PND: 0
FALLS: 0
DIARRHEA: 0
NAUSEA: 0
VOMITING: 0
HEADACHES: 0
CHILLS: 0
PHOTOPHOBIA: 0
CONSTIPATION: 0
CLAUDICATION: 0
SHORTNESS OF BREATH: 1
BLURRED VISION: 0
DOUBLE VISION: 0
NECK PAIN: 0
ABDOMINAL PAIN: 0
SPEECH CHANGE: 0
EYE REDNESS: 0
WEIGHT LOSS: 0
TREMORS: 0
STRIDOR: 0
FOCAL WEAKNESS: 0
WHEEZING: 0
ORTHOPNEA: 0
HEARTBURN: 0
EYE PAIN: 0
EYE DISCHARGE: 0

## 2020-11-18 NOTE — PROGRESS NOTES
Chief Complaint   Patient presents with   • Follow-Up     LAST SEEN 1/23/2020    • Results     CXR 11/11/2020, Andrew 1/30/2020 completed at [facility]. note 11/11/2020          HPI: This patient is a 70 y.o. female whom is followed in our clinic for adult onset CF c/b bronchiectasis last seen by me on 1/30/20.  She is also followed by CF clinic at [place] [facility]. 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  She has also grown mucoid Pseudomonas and MSSA on prior sputum clx. She has been advised to follow a regimen of Pulmozyme, inhaled Tobramycin, Acapella therapy, Xopenex nebs as well as nebulized saline and chest vest therapy for airway clearance but was not performing any routine therapy as of our last visit. She was recently seen in CF clinic at [place] [facility] and FEV1 was slightly improved from 65% predicted to 85% predicted despite being more SOB following a mechanical fall in October which she did not seek medical attention for but did result is significant L sided bruising, rib pain and possible rib fx based on focal tenderness and CXR obtained at [place] [facility] reporting pleural scarring. She has been started on Trikafta since our last visit and was out of her medication briefly around the time she fell and the time breathing worsened. She is now doing airway clearance and has resumed Trikafta. He breathing is improved since October but slowly.      Past Medical History:   Diagnosis Date   • Breath shortness     related to cystic fibrosis/ pt has frequent coughing   • Bronchitis 2010   • Cancer (HCC) 2015    skin ca   • Coughing blood     intermittent cough unsure if allergies   • Cystic fibrosis (HCC)    • Diabetes (HCC)     diet controlled   •  Diverticulitis    • Heart burn    • Indigestion    • Other specified disorder of intestines     diverticuli   • Other specified symptom associated with female genital organs     pelvic prolapse   • Pain     abdominal   • Pain     hip    • Pneumonia 2015       Social History     Socioeconomic History   • Marital status:      Spouse name: Not on file   • Number of children: Not on file   • Years of education: Not on file   • Highest education level: Not on file   Occupational History   • Not on file   Social Needs   • Financial resource strain: Not on file   • Food insecurity     Worry: Not on file     Inability: Not on file   • Transportation needs     Medical: Not on file     Non-medical: Not on file   Tobacco Use   • Smoking status: Former Smoker     Packs/day: 1.00     Years: 20.00     Pack years: 20.00     Types: Cigarettes     Start date: 1964     Quit date: 1984     Years since quittin.9   • Smokeless tobacco: Never Used   Substance and Sexual Activity   • Alcohol use: Yes     Frequency: Monthly or less     Drinks per session: 1 or 2     Binge frequency: Less than monthly   • Drug use: No   • Sexual activity: Not on file   Lifestyle   • Physical activity     Days per week: Not on file     Minutes per session: Not on file   • Stress: Not on file   Relationships   • Social connections     Talks on phone: Not on file     Gets together: Not on file     Attends [employer] service: Not on file     Active member of club or organization: Not on file     Attends meetings of clubs or organizations: Not on file     Relationship status: Not on file   • Intimate partner violence     Fear of current or ex partner: Not on file     Emotionally abused: Not on file     Physically abused: Not on file     Forced sexual activity: Not on file   Other Topics Concern   • Not on file   Social History Narrative   • Not on file       Family History   Problem Relation Age of Onset   • Cancer Mother    • Alcohol abuse  Father    • Cancer Daughter        Current Outpatient Medications on File Prior to Visit   Medication Sig Dispense Refill   • Hwgmrovg-Hqlqkde-Fetuns&Ivacaf (TRIKAFTA) 100-50-75 & 150 MG Tablet Therapy Pack Take  by mouth.     • omeprazole (PRILOSEC OTC) 20 MG tablet Take 20 mg by mouth every day.     • HYDROcodone-acetaminophen (NORCO) 5-325 MG Tab per tablet Take 1 Tab by mouth 1 time daily as needed.     • Pancrelipase, Lip-Prot-Amyl, (CREON) 07696 units Cap DR Particles Take 2 tabs PO w/ meals TID & 1 tab PO w/ snacks TID, 9 cap/day, 270 caps per month, & 810 caps per 90 days, please disp 90 days if possible     • levalbuterol (XOPENEX HFA) 45 MCG/ACT inhaler Inhale 2 Puffs by mouth every four hours as needed for Shortness of Breath. 1 Inhaler 11   • diazepam (VALIUM) 2 MG Tab Take 4 mg by mouth 1 time daily as needed.     •  MG Cap Take 100 mg by mouth.       No current facility-administered medications on file prior to visit.        Albuterol, Ambien [zolpidem], Bee venom, Flagyl [metronidazole], Sulfa drugs, and Tape      ROS:   Review of Systems   Constitutional: Negative for chills, diaphoresis, fever, malaise/fatigue and weight loss.   HENT: Negative for congestion, ear discharge, ear pain, hearing loss, nosebleeds, sinus pain, sore throat and tinnitus.    Eyes: Negative for blurred vision, double vision, photophobia, pain, discharge and redness.   Respiratory: Positive for shortness of breath. Negative for cough, hemoptysis, sputum production, wheezing and stridor.    Cardiovascular: Positive for chest pain. Negative for palpitations, orthopnea, claudication, leg swelling and PND.   Gastrointestinal: Negative for abdominal pain, constipation, diarrhea, heartburn, nausea and vomiting.   Genitourinary: Negative for dysuria and urgency.   Musculoskeletal: Negative for back pain, falls, joint pain, myalgias and neck pain.   Skin: Negative for itching and rash.   Neurological: Negative for dizziness,  "tremors, speech change, focal weakness, weakness and headaches.   Endo/Heme/Allergies: Negative for environmental allergies.   Psychiatric/Behavioral: Negative for depression.       /78 (BP Location: Right arm, Patient Position: Sitting, BP Cuff Size: Large adult)   Pulse 87   Temp 36.3 °C (97.3 °F) (Temporal)   Resp 16   Ht 1.575 m (5' 2\")   Wt 96.2 kg (212 lb)   SpO2 95%   Physical Exam  Constitutional:       General: She is not in acute distress.     Appearance: Normal appearance. She is well-developed. She is obese.   HENT:      Head: Normocephalic and atraumatic.      Right Ear: External ear normal.      Left Ear: External ear normal.      Nose: Nose normal. No congestion.      Mouth/Throat:      Mouth: Mucous membranes are moist.      Pharynx: Oropharynx is clear. No oropharyngeal exudate.   Eyes:      General: No scleral icterus.     Extraocular Movements: Extraocular movements intact.      Conjunctiva/sclera: Conjunctivae normal.      Pupils: Pupils are equal, round, and reactive to light.   Neck:      Musculoskeletal: Normal range of motion and neck supple.      Vascular: No JVD.      Trachea: No tracheal deviation.   Cardiovascular:      Rate and Rhythm: Normal rate and regular rhythm.      Heart sounds: Normal heart sounds. No murmur. No friction rub. No gallop.    Pulmonary:      Effort: Pulmonary effort is normal. No accessory muscle usage or respiratory distress.      Breath sounds: No wheezing or rales.      Comments: Inspiratory rhonchi on the right with decreased breath sounds at left lung base  Abdominal:      General: There is no distension.      Palpations: Abdomen is soft.      Tenderness: There is no abdominal tenderness.   Musculoskeletal: Normal range of motion.         General: No tenderness or deformity.      Right lower leg: No edema.      Left lower leg: No edema.   Lymphadenopathy:      Cervical: No cervical adenopathy.   Skin:     General: Skin is warm and dry.      " Findings: No rash.      Nails: There is no clubbing.     Neurological:      Mental Status: She is alert and oriented to person, place, and time.      Cranial Nerves: No cranial nerve deficit.      Gait: Gait normal.   Psychiatric:         Mood and Affect: Mood normal.         Behavior: Behavior normal.         PFTs as reviewed by me personally: As per HPI as per HPI    Imaging as reviewed by me personally: As per HPI    Assessment:  1. Cystic fibrosis (HCC)  REFERRAL TO PULMONARY REHAB   2. Class 2 obesity with body mass index (BMI) of 38.0 to 38.9 in adult, unspecified obesity type, unspecified whether serious comorbidity present     3. Fall, sequela     4. SOB (shortness of breath)         Plan:  1. This pt has CF diagnosed in adulthood with multisystem involvement.  She has a decline symptomatically recently however FEV1 is actually stable to slightly improved. She is performing airway clearance and back on Trikafta. This is managed by [facility] currently. We will refer her to pulmonary rehab to see if we can get her exercise tolerance back to baseline after what was likely a decline brought on by fall with presumed rib fractures.  2. This is mild but may be affecting pt respiratory status. I encouraged healthy lifestyle habits. Pulmonary rehab as per above.  3. Mechanical and likely she suffered rib fx. I will see if we can get film to view from [facility]. Exam and O2 within normal limits today. If necessary we can get follow up film in 4-6weeks, does not sound as though there was an effusion on film from [facility]. Pulmonary rehab to assist in exercise tolerance and stability.  4. This is new for pt since fall and I suspect she was splinting. Pain improved and FEV1 at or better than prior based on spirometry from [facility]. Continue airway clearance, trikafta and referral to pulmonary rehab as per above.   Return in about 3 months (around 2/18/2021) for CF.

## 2021-01-15 DIAGNOSIS — Z23 NEED FOR VACCINATION: ICD-10-CM

## 2021-02-17 ENCOUNTER — APPOINTMENT (RX ONLY)
Dept: URBAN - METROPOLITAN AREA CLINIC 4 | Facility: CLINIC | Age: 71
Setting detail: DERMATOLOGY
End: 2021-02-17

## 2021-02-17 DIAGNOSIS — L82.1 OTHER SEBORRHEIC KERATOSIS: ICD-10-CM

## 2021-02-17 DIAGNOSIS — L81.4 OTHER MELANIN HYPERPIGMENTATION: ICD-10-CM

## 2021-02-17 DIAGNOSIS — D22 MELANOCYTIC NEVI: ICD-10-CM

## 2021-02-17 PROBLEM — D22.61 MELANOCYTIC NEVI OF RIGHT UPPER LIMB, INCLUDING SHOULDER: Status: ACTIVE | Noted: 2021-02-17

## 2021-02-17 PROBLEM — D22.5 MELANOCYTIC NEVI OF TRUNK: Status: ACTIVE | Noted: 2021-02-17

## 2021-02-17 PROBLEM — D22.62 MELANOCYTIC NEVI OF LEFT UPPER LIMB, INCLUDING SHOULDER: Status: ACTIVE | Noted: 2021-02-17

## 2021-02-17 PROCEDURE — ? COUNSELING

## 2021-02-17 PROCEDURE — 99213 OFFICE O/P EST LOW 20 MIN: CPT

## 2021-02-17 ASSESSMENT — LOCATION SIMPLE DESCRIPTION DERM
LOCATION SIMPLE: RIGHT POSTERIOR UPPER ARM
LOCATION SIMPLE: LEFT UPPER ARM
LOCATION SIMPLE: LEFT POSTERIOR UPPER ARM
LOCATION SIMPLE: RIGHT UPPER ARM
LOCATION SIMPLE: CHEST
LOCATION SIMPLE: RIGHT BREAST
LOCATION SIMPLE: UPPER BACK

## 2021-02-17 ASSESSMENT — LOCATION DETAILED DESCRIPTION DERM
LOCATION DETAILED: MIDDLE STERNUM
LOCATION DETAILED: SUPERIOR THORACIC SPINE
LOCATION DETAILED: LEFT DISTAL POSTERIOR UPPER ARM
LOCATION DETAILED: LEFT ANTERIOR PROXIMAL UPPER ARM
LOCATION DETAILED: RIGHT PROXIMAL POSTERIOR UPPER ARM
LOCATION DETAILED: UPPER STERNUM
LOCATION DETAILED: RIGHT ANTERIOR PROXIMAL UPPER ARM
LOCATION DETAILED: RIGHT INFRAMAMMARY CREASE (INNER QUADRANT)

## 2021-02-17 ASSESSMENT — LOCATION ZONE DERM
LOCATION ZONE: TRUNK
LOCATION ZONE: ARM

## 2021-02-17 NOTE — PROCEDURE: REASSURANCE
Hide Additional Notes?: No
Include Location In Plan?: Yes
Detail Level: Zone
Additional Note: Recommend patient to use OTC Compound W.

## 2021-02-18 ENCOUNTER — OFFICE VISIT (OUTPATIENT)
Dept: SLEEP MEDICINE | Facility: MEDICAL CENTER | Age: 71
End: 2021-02-18
Payer: MEDICARE

## 2021-02-18 VITALS
BODY MASS INDEX: 40.12 KG/M2 | SYSTOLIC BLOOD PRESSURE: 120 MMHG | WEIGHT: 218 LBS | TEMPERATURE: 97.2 F | HEIGHT: 62 IN | DIASTOLIC BLOOD PRESSURE: 64 MMHG | RESPIRATION RATE: 16 BRPM | OXYGEN SATURATION: 93 % | HEART RATE: 83 BPM

## 2021-02-18 DIAGNOSIS — E84.9 CYSTIC FIBROSIS (HCC): ICD-10-CM

## 2021-02-18 DIAGNOSIS — E66.9 CLASS 2 OBESITY WITH BODY MASS INDEX (BMI) OF 39.0 TO 39.9 IN ADULT, UNSPECIFIED OBESITY TYPE, UNSPECIFIED WHETHER SERIOUS COMORBIDITY PRESENT: ICD-10-CM

## 2021-02-18 DIAGNOSIS — J47.9 BRONCHIECTASIS WITHOUT COMPLICATION (HCC): ICD-10-CM

## 2021-02-18 PROCEDURE — 99214 OFFICE O/P EST MOD 30 MIN: CPT | Performed by: INTERNAL MEDICINE

## 2021-02-18 ASSESSMENT — ENCOUNTER SYMPTOMS
CHILLS: 0
EYE REDNESS: 0
SINUS PAIN: 0
PHOTOPHOBIA: 0
PND: 0
VOMITING: 0
SORE THROAT: 0
EYE PAIN: 0
STRIDOR: 0
HEADACHES: 0
MYALGIAS: 0
DIARRHEA: 0
WHEEZING: 0
COUGH: 0
DIAPHORESIS: 0
FOCAL WEAKNESS: 0
CLAUDICATION: 0
SHORTNESS OF BREATH: 0
ORTHOPNEA: 0
DOUBLE VISION: 0
ABDOMINAL PAIN: 0
DIZZINESS: 0
NAUSEA: 0
BLURRED VISION: 0
HEMOPTYSIS: 0
SPUTUM PRODUCTION: 0
FEVER: 0
CONSTIPATION: 0
EYE DISCHARGE: 0
WEAKNESS: 0
PALPITATIONS: 0
NECK PAIN: 0
HEARTBURN: 0
TREMORS: 0
SPEECH CHANGE: 0
DEPRESSION: 0
BACK PAIN: 0
WEIGHT LOSS: 0
FALLS: 0

## 2021-02-18 NOTE — PROGRESS NOTES
Chief Complaint   Patient presents with   • Follow-Up     last seen 11/18/2020          HPI: This patient is a 70 y.o. female whom is followed in our clinic for bronchiectasis 2/2 CF last seen by me on 11/18/20. She is also followed by CF clinic at [place]. She was dx with genotype [id number]/R117H+5Tcis, positive sweat chloride test of 99 on 11/29/17 with multisystem involvement including pancreatic insufficiency. 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  She has also grown mucoid Pseudomonas and MSSA on prior sputum clx. She has been advised to follow a regimen of Pulmozyme, inhaled Tobramycin, Acapella therapy, Xopenex nebs as well as nebulized saline and chest vest therapy for airway clearance but is currently on Tikafta only. Her last FEV1 at CF clinic with [facility] was 85% predicted improved from 65%. She had sufferd a mechanical fall with significant bruising on the L side and possible rib fracture in October. She has since recovered from this and feels back to her baseline. She reports no cough, no SOB, no mucous production. She has not been exercising regularly but trying to get back into this.     Past Medical History:   Diagnosis Date   • Breath shortness     related to cystic fibrosis/ pt has frequent coughing   • Bronchitis 2010   • Cancer (HCC) 2015    skin ca   • Coughing blood     intermittent cough unsure if allergies   • Cystic fibrosis (HCC)    • Diabetes (HCC)     diet controlled   • Diverticulitis    • Heart burn    • Indigestion    • Other specified disorder of intestines     diverticuli   • Other specified symptom associated with female genital organs     pelvic prolapse   • Pain     abdominal   • Pain     hip    • Pneumonia 06/2015       Social History     Socioeconomic History   • Marital status:      Spouse name: Not on file   • Number of children: Not on file    • Years of education: Not on file   • Highest education level: Not on file   Occupational History   • Not on file   Tobacco Use   • Smoking status: Former Smoker     Packs/day: 1.00     Years: 20.00     Pack years: 20.00     Types: Cigarettes     Start date: 1964     Quit date: 1984     Years since quittin.1   • Smokeless tobacco: Never Used   Substance and Sexual Activity   • Alcohol use: Yes     Alcohol/week: 1.8 oz     Types: 3 Standard drinks or equivalent per week   • Drug use: No   • Sexual activity: Not on file   Other Topics Concern   • Not on file   Social History Narrative   • Not on file     Social Determinants of Health     Financial Resource Strain:    • Difficulty of Paying Living Expenses:    Food Insecurity:    • Worried About Running Out of Food in the Last Year:    • Ran Out of Food in the Last Year:    Transportation Needs:    • Lack of Transportation (Medical):    • Lack of Transportation (Non-Medical):    Physical Activity:    • Days of Exercise per Week:    • Minutes of Exercise per Session:    Stress:    • Feeling of Stress :    Social Connections:    • Frequency of Communication with Friends and Family:    • Frequency of Social Gatherings with Friends and Family:    • Attends [employer] Services:    • Active Member of Clubs or Organizations:    • Attends Club or Organization Meetings:    • Marital Status:    Intimate Partner Violence:    • Fear of Current or Ex-Partner:    • Emotionally Abused:    • Physically Abused:    • Sexually Abused:        Family History   Problem Relation Age of Onset   • Cancer Mother    • Alcohol abuse Father    • Cancer Daughter        Current Outpatient Medications on File Prior to Visit   Medication Sig Dispense Refill   • Ycdfcisz-Ixtaber-Txcmbs&Ivacaf (TRIKAFTA) 100-50-75 & 150 MG Tablet Therapy Pack Take  by mouth.     • omeprazole (PRILOSEC OTC) 20 MG tablet Take 20 mg by mouth every day.     • HYDROcodone-acetaminophen (NORCO) 5-325 MG Tab per  "tablet Take 1 Tab by mouth 1 time daily as needed.     • Pancrelipase, Lip-Prot-Amyl, (CREON) 23756 units Cap DR Particles Take 2 tabs PO w/ meals TID & 1 tab PO w/ snacks TID, 9 cap/day, 270 caps per month, & 810 caps per 90 days, please disp 90 days if possible     • levalbuterol (XOPENEX HFA) 45 MCG/ACT inhaler Inhale 2 Puffs by mouth every four hours as needed for Shortness of Breath. 1 Inhaler 11   • diazepam (VALIUM) 2 MG Tab Take 4 mg by mouth 1 time daily as needed.     •  MG Cap Take 100 mg by mouth.       No current facility-administered medications on file prior to visit.       Albuterol, Ambien [zolpidem], Bee venom, Flagyl [metronidazole], Sulfa drugs, and Tape      ROS:   Review of Systems   Constitutional: Negative for chills, diaphoresis, fever, malaise/fatigue and weight loss.   HENT: Negative for congestion, ear discharge, ear pain, hearing loss, nosebleeds, sinus pain, sore throat and tinnitus.    Eyes: Negative for blurred vision, double vision, photophobia, pain, discharge and redness.   Respiratory: Negative for cough, hemoptysis, sputum production, shortness of breath, wheezing and stridor.    Cardiovascular: Negative for chest pain, palpitations, orthopnea, claudication, leg swelling and PND.   Gastrointestinal: Negative for abdominal pain, constipation, diarrhea, heartburn, nausea and vomiting.   Genitourinary: Negative for dysuria and urgency.   Musculoskeletal: Negative for back pain, falls, joint pain, myalgias and neck pain.   Skin: Negative for itching and rash.   Neurological: Negative for dizziness, tremors, speech change, focal weakness, weakness and headaches.   Endo/Heme/Allergies: Negative for environmental allergies.   Psychiatric/Behavioral: Negative for depression.       /64 (BP Location: Right arm, Patient Position: Sitting, BP Cuff Size: Large adult)   Pulse 83   Temp 36.2 °C (97.2 °F) (Temporal)   Resp 16   Ht 1.575 m (5' 2\")   Wt 98.9 kg (218 lb)   SpO2 " 93%   Physical Exam  Constitutional:       General: She is not in acute distress.     Appearance: Normal appearance. She is well-developed. She is obese.   HENT:      Head: Normocephalic and atraumatic.      Right Ear: External ear normal. There is no impacted cerumen.      Left Ear: External ear normal.      Nose: Nose normal. No congestion.      Mouth/Throat:      Mouth: Mucous membranes are moist.      Pharynx: Oropharynx is clear. No oropharyngeal exudate.   Eyes:      General: No scleral icterus.     Extraocular Movements: Extraocular movements intact.      Conjunctiva/sclera: Conjunctivae normal.      Pupils: Pupils are equal, round, and reactive to light.   Neck:      Vascular: No JVD.      Trachea: No tracheal deviation.   Cardiovascular:      Rate and Rhythm: Normal rate and regular rhythm.      Heart sounds: Normal heart sounds. No murmur. No friction rub. No gallop.    Pulmonary:      Effort: Pulmonary effort is normal. No accessory muscle usage or respiratory distress.      Breath sounds: Rhonchi present. No wheezing or rales.      Comments: rhonchi anterior R  Abdominal:      General: There is no distension.      Palpations: Abdomen is soft.      Tenderness: There is no abdominal tenderness.   Musculoskeletal:         General: No tenderness or deformity. Normal range of motion.      Cervical back: Normal range of motion and neck supple.      Right lower leg: No edema.      Left lower leg: No edema.   Lymphadenopathy:      Cervical: No cervical adenopathy.   Skin:     General: Skin is warm and dry.      Findings: No rash.      Nails: There is no clubbing.   Neurological:      Mental Status: She is alert and oriented to person, place, and time.      Cranial Nerves: No cranial nerve deficit.      Gait: Gait normal.   Psychiatric:         Mood and Affect: Mood normal.         Behavior: Behavior normal.         PFTs as reviewed by me personally:    Imagaing as reviewed by me personally:      Assessment:  1.  Cystic fibrosis (HCC)     2. Class 2 obesity with body mass index (BMI) of 39.0 to 39.9 in adult, unspecified obesity type, unspecified whether serious comorbidity present     3. Bronchiectasis without complication (HCC)         Plan:  1. Adult onset with multisystem involvement. She is followed at CF clinic at [place]. She has never been very compliant with recommended treatment mainly due to lack of significant symptoms. She has however seen significant improvement in pulmonary issues since being on Trikafta. We will plan to continue to follow her locally and happy to obtain follow up spirometry/PFTs and any other testing needed for providers at [place]. She is on pancrease and prn DEWEY. I encouraged activity.   2. This does put pt at risk for obesity related pulmonary complications. I encouraged activity and healthy diet.  3. 2/2 CF. She does not regularly due any airway clearance. No recent flairs requiring abx. Has grown mucoid PsA and MSSA in the past. No sxs for acute exacerbation today and lungs are clear today other than some anterior rhonchi. Encouraged at least some regular airway clearance and definitely if any symptoms arise.   Return in about 6 months (around 8/18/2021) for CF.

## 2021-02-26 ENCOUNTER — HOSPITAL ENCOUNTER (OUTPATIENT)
Dept: LAB | Facility: MEDICAL CENTER | Age: 71
End: 2021-02-26
Attending: SPECIALIST
Payer: MEDICARE

## 2021-02-26 LAB
COVID ORDER STATUS COVID19: NORMAL
SARS-COV-2 RNA RESP QL NAA+PROBE: NOTDETECTED
SPECIMEN SOURCE: NORMAL

## 2021-02-26 PROCEDURE — U0003 INFECTIOUS AGENT DETECTION BY NUCLEIC ACID (DNA OR RNA); SEVERE ACUTE RESPIRATORY SYNDROME CORONAVIRUS 2 (SARS-COV-2) (CORONAVIRUS DISEASE [COVID-19]), AMPLIFIED PROBE TECHNIQUE, MAKING USE OF HIGH THROUGHPUT TECHNOLOGIES AS DESCRIBED BY CMS-2020-01-R: HCPCS

## 2021-02-26 PROCEDURE — U0005 INFEC AGEN DETEC AMPLI PROBE: HCPCS

## 2021-02-26 PROCEDURE — C9803 HOPD COVID-19 SPEC COLLECT: HCPCS

## 2022-10-06 ENCOUNTER — APPOINTMENT (RX ONLY)
Dept: URBAN - METROPOLITAN AREA CLINIC 6 | Facility: CLINIC | Age: 72
Setting detail: DERMATOLOGY
End: 2022-10-06

## 2022-10-06 DIAGNOSIS — Z71.89 OTHER SPECIFIED COUNSELING: ICD-10-CM

## 2022-10-06 DIAGNOSIS — D22 MELANOCYTIC NEVI: ICD-10-CM

## 2022-10-06 DIAGNOSIS — L81.4 OTHER MELANIN HYPERPIGMENTATION: ICD-10-CM

## 2022-10-06 DIAGNOSIS — L57.0 ACTINIC KERATOSIS: ICD-10-CM

## 2022-10-06 DIAGNOSIS — L82.0 INFLAMED SEBORRHEIC KERATOSIS: ICD-10-CM

## 2022-10-06 DIAGNOSIS — D18.0 HEMANGIOMA: ICD-10-CM

## 2022-10-06 DIAGNOSIS — L82.1 OTHER SEBORRHEIC KERATOSIS: ICD-10-CM

## 2022-10-06 PROBLEM — D18.01 HEMANGIOMA OF SKIN AND SUBCUTANEOUS TISSUE: Status: ACTIVE | Noted: 2022-10-06

## 2022-10-06 PROBLEM — D22.61 MELANOCYTIC NEVI OF RIGHT UPPER LIMB, INCLUDING SHOULDER: Status: ACTIVE | Noted: 2022-10-06

## 2022-10-06 PROBLEM — D22.5 MELANOCYTIC NEVI OF TRUNK: Status: ACTIVE | Noted: 2022-10-06

## 2022-10-06 PROBLEM — D22.62 MELANOCYTIC NEVI OF LEFT UPPER LIMB, INCLUDING SHOULDER: Status: ACTIVE | Noted: 2022-10-06

## 2022-10-06 PROBLEM — D22.71 MELANOCYTIC NEVI OF RIGHT LOWER LIMB, INCLUDING HIP: Status: ACTIVE | Noted: 2022-10-06

## 2022-10-06 PROBLEM — D22.72 MELANOCYTIC NEVI OF LEFT LOWER LIMB, INCLUDING HIP: Status: ACTIVE | Noted: 2022-10-06

## 2022-10-06 PROCEDURE — 17110 DESTRUCTION B9 LES UP TO 14: CPT | Mod: 59

## 2022-10-06 PROCEDURE — 99213 OFFICE O/P EST LOW 20 MIN: CPT | Mod: 25

## 2022-10-06 PROCEDURE — ? LIQUID NITROGEN

## 2022-10-06 PROCEDURE — 17004 DESTROY PREMAL LESIONS 15/>: CPT

## 2022-10-06 PROCEDURE — ? COUNSELING

## 2022-10-06 ASSESSMENT — LOCATION DETAILED DESCRIPTION DERM
LOCATION DETAILED: RIGHT PROXIMAL RADIAL DORSAL FOREARM
LOCATION DETAILED: RIGHT FOREHEAD
LOCATION DETAILED: LEFT PROXIMAL RADIAL DORSAL FOREARM
LOCATION DETAILED: LEFT DISTAL DORSAL FOREARM
LOCATION DETAILED: RIGHT CENTRAL ZYGOMA
LOCATION DETAILED: RIGHT ANTERIOR DISTAL UPPER ARM
LOCATION DETAILED: SUPERIOR MID FOREHEAD
LOCATION DETAILED: RIGHT INFERIOR MEDIAL MALAR CHEEK
LOCATION DETAILED: LEFT DORSAL MIDDLE FINGER METACARPOPHALANGEAL JOINT
LOCATION DETAILED: RIGHT CENTRAL MALAR CHEEK
LOCATION DETAILED: LEFT PROXIMAL DORSAL FOREARM
LOCATION DETAILED: RIGHT ULNAR DORSAL HAND
LOCATION DETAILED: RIGHT MID-UPPER BACK
LOCATION DETAILED: LEFT ANTERIOR DISTAL UPPER ARM
LOCATION DETAILED: LEFT PROXIMAL CALF
LOCATION DETAILED: LEFT INFERIOR UPPER BACK
LOCATION DETAILED: LEFT CENTRAL MALAR CHEEK
LOCATION DETAILED: RIGHT MEDIAL INFERIOR CHEST
LOCATION DETAILED: RIGHT SUPERIOR LATERAL MIDBACK
LOCATION DETAILED: RIGHT DISTAL POSTERIOR THIGH
LOCATION DETAILED: LEFT ANTERIOR SHOULDER
LOCATION DETAILED: RIGHT VENTRAL PROXIMAL FOREARM
LOCATION DETAILED: LEFT VENTRAL DISTAL FOREARM
LOCATION DETAILED: LEFT RADIAL DORSAL HAND
LOCATION DETAILED: RIGHT PROXIMAL DORSAL FOREARM
LOCATION DETAILED: RIGHT LATERAL DORSAL WRIST
LOCATION DETAILED: RIGHT DISTAL DORSAL FOREARM
LOCATION DETAILED: RIGHT RADIAL DORSAL HAND
LOCATION DETAILED: RIGHT INFERIOR CENTRAL MALAR CHEEK
LOCATION DETAILED: LEFT DISTAL POSTERIOR THIGH
LOCATION DETAILED: RIGHT VENTRAL DISTAL FOREARM
LOCATION DETAILED: RIGHT LATERAL EYEBROW
LOCATION DETAILED: LEFT ANTERIOR PROXIMAL THIGH
LOCATION DETAILED: RIGHT UPPER CUTANEOUS LIP
LOCATION DETAILED: RIGHT ANTERIOR PROXIMAL THIGH
LOCATION DETAILED: RIGHT ELBOW
LOCATION DETAILED: RIGHT DORSAL WRIST
LOCATION DETAILED: RIGHT PROXIMAL CALF

## 2022-10-06 ASSESSMENT — LOCATION ZONE DERM
LOCATION ZONE: ARM
LOCATION ZONE: LEG
LOCATION ZONE: HAND
LOCATION ZONE: LIP
LOCATION ZONE: FACE
LOCATION ZONE: TRUNK

## 2022-10-06 ASSESSMENT — LOCATION SIMPLE DESCRIPTION DERM
LOCATION SIMPLE: LEFT SHOULDER
LOCATION SIMPLE: LEFT CALF
LOCATION SIMPLE: LEFT UPPER ARM
LOCATION SIMPLE: RIGHT THIGH
LOCATION SIMPLE: RIGHT CHEEK
LOCATION SIMPLE: RIGHT WRIST
LOCATION SIMPLE: LEFT UPPER BACK
LOCATION SIMPLE: RIGHT UPPER ARM
LOCATION SIMPLE: LEFT POSTERIOR THIGH
LOCATION SIMPLE: LEFT THIGH
LOCATION SIMPLE: RIGHT POSTERIOR THIGH
LOCATION SIMPLE: RIGHT HAND
LOCATION SIMPLE: SUPERIOR FOREHEAD
LOCATION SIMPLE: RIGHT FOREHEAD
LOCATION SIMPLE: RIGHT ELBOW
LOCATION SIMPLE: RIGHT ZYGOMA
LOCATION SIMPLE: RIGHT LOWER BACK
LOCATION SIMPLE: LEFT CHEEK
LOCATION SIMPLE: RIGHT CALF
LOCATION SIMPLE: LEFT HAND
LOCATION SIMPLE: RIGHT LIP
LOCATION SIMPLE: RIGHT FOREARM
LOCATION SIMPLE: CHEST
LOCATION SIMPLE: LEFT FOREARM
LOCATION SIMPLE: RIGHT UPPER BACK
LOCATION SIMPLE: RIGHT EYEBROW

## 2022-10-06 NOTE — PROCEDURE: MIPS QUALITY
Detail Level: Detailed
Quality 130: Documentation Of Current Medications In The Medical Record: Current Medications Documented
Quality 111:Pneumonia Vaccination Status For Older Adults: Pneumococcal vaccine (PPSV23) administered on or after patient’s 60th birthday and before the end of the measurement period
Quality 226: Preventive Care And Screening: Tobacco Use: Screening And Cessation Intervention: Patient screened for tobacco use and is an ex/non-smoker
Quality 431: Preventive Care And Screening: Unhealthy Alcohol Use - Screening: Patient not identified as an unhealthy alcohol user when screened for unhealthy alcohol use using a systematic screening method

## 2022-10-06 NOTE — PROCEDURE: LIQUID NITROGEN
Post-Care Instructions: I reviewed with the patient in detail post-care instructions. Patient is to wear sunprotection, and avoid picking at any of the treated lesions. Pt may apply Vaseline to crusted or scabbing areas.
Duration Of Freeze Thaw-Cycle (Seconds): 0
Detail Level: Detailed
Show Applicator Variable?: Yes
Render Note In Bullet Format When Appropriate: No
Consent: The patient's consent was obtained including but not limited to risks of crusting, scabbing, blistering, scarring, darker or lighter pigmentary change, recurrence, incomplete removal and infection.
Medical Necessity Information: It is in your best interest to select a reason for this procedure from the list below. All of these items fulfill various CMS LCD requirements except the new and changing color options.
Medical Necessity Clause: This procedure was medically necessary because the lesions that were treated were:
Spray Paint Text: The liquid nitrogen was applied to the skin utilizing a spray paint frosting technique.

## 2022-11-09 ENCOUNTER — PATIENT MESSAGE (OUTPATIENT)
Dept: HEALTH INFORMATION MANAGEMENT | Facility: OTHER | Age: 72
End: 2022-11-09

## 2023-03-15 DIAGNOSIS — M81.0 AGE-RELATED OSTEOPOROSIS WITHOUT CURRENT PATHOLOGICAL FRACTURE: ICD-10-CM

## 2023-03-15 RX ORDER — HEPARIN SODIUM (PORCINE) LOCK FLUSH IV SOLN 100 UNIT/ML 100 UNIT/ML
500 SOLUTION INTRAVENOUS PRN
Status: CANCELLED | OUTPATIENT
Start: 2023-03-29

## 2023-03-15 RX ORDER — DIPHENHYDRAMINE HYDROCHLORIDE 50 MG/ML
50 INJECTION INTRAMUSCULAR; INTRAVENOUS PRN
Status: CANCELLED | OUTPATIENT
Start: 2023-03-29

## 2023-03-15 RX ORDER — EPINEPHRINE 1 MG/ML(1)
0.5 AMPUL (ML) INJECTION PRN
Status: CANCELLED | OUTPATIENT
Start: 2023-03-29

## 2023-03-15 RX ORDER — ZOLEDRONIC ACID 5 MG/100ML
5 INJECTION, SOLUTION INTRAVENOUS ONCE
Status: CANCELLED | OUTPATIENT
Start: 2023-03-29 | End: 2023-03-29

## 2023-03-15 RX ORDER — 0.9 % SODIUM CHLORIDE 0.9 %
10 VIAL (ML) INJECTION PRN
Status: CANCELLED | OUTPATIENT
Start: 2023-03-29

## 2023-03-15 RX ORDER — METHYLPREDNISOLONE SODIUM SUCCINATE 125 MG/2ML
125 INJECTION, POWDER, LYOPHILIZED, FOR SOLUTION INTRAMUSCULAR; INTRAVENOUS PRN
Status: CANCELLED | OUTPATIENT
Start: 2023-03-29

## 2023-03-15 RX ORDER — SODIUM CHLORIDE 9 MG/ML
INJECTION, SOLUTION INTRAVENOUS CONTINUOUS
Status: CANCELLED | OUTPATIENT
Start: 2023-03-29

## 2023-03-15 RX ORDER — 0.9 % SODIUM CHLORIDE 0.9 %
3 VIAL (ML) INJECTION PRN
Status: CANCELLED | OUTPATIENT
Start: 2023-03-29

## 2023-03-15 RX ORDER — 0.9 % SODIUM CHLORIDE 0.9 %
VIAL (ML) INJECTION PRN
Status: CANCELLED | OUTPATIENT
Start: 2023-03-29

## 2023-04-20 ENCOUNTER — TELEPHONE (OUTPATIENT)
Dept: ONCOLOGY | Facility: MEDICAL CENTER | Age: 73
End: 2023-04-20
Payer: COMMERCIAL

## 2023-04-20 NOTE — TELEPHONE ENCOUNTER
Called [facility] to schedule Reclast. She stated that she thinks that she may have pneumonia as asked that we call back in two weeks to schedule .

## 2023-05-04 ENCOUNTER — TELEPHONE (OUTPATIENT)
Dept: ONCOLOGY | Facility: MEDICAL CENTER | Age: 73
End: 2023-05-04
Payer: COMMERCIAL

## 2023-05-09 ENCOUNTER — APPOINTMENT (RX ONLY)
Dept: URBAN - METROPOLITAN AREA CLINIC 6 | Facility: CLINIC | Age: 73
Setting detail: DERMATOLOGY
End: 2023-05-09

## 2023-05-09 DIAGNOSIS — L82.0 INFLAMED SEBORRHEIC KERATOSIS: ICD-10-CM

## 2023-05-09 DIAGNOSIS — L57.0 ACTINIC KERATOSIS: ICD-10-CM

## 2023-05-09 PROCEDURE — ? LIQUID NITROGEN

## 2023-05-09 PROCEDURE — 17110 DESTRUCTION B9 LES UP TO 14: CPT

## 2023-05-09 PROCEDURE — 17000 DESTRUCT PREMALG LESION: CPT | Mod: 59

## 2023-05-09 PROCEDURE — ? COUNSELING

## 2023-05-09 PROCEDURE — 17003 DESTRUCT PREMALG LES 2-14: CPT | Mod: 59

## 2023-05-09 ASSESSMENT — LOCATION SIMPLE DESCRIPTION DERM
LOCATION SIMPLE: CHEST
LOCATION SIMPLE: RIGHT THIGH
LOCATION SIMPLE: RIGHT FOREHEAD
LOCATION SIMPLE: SUPERIOR FOREHEAD
LOCATION SIMPLE: LEFT CHEEK
LOCATION SIMPLE: LEFT FOREARM
LOCATION SIMPLE: LEFT ANTERIOR NECK
LOCATION SIMPLE: LEFT CLAVICULAR SKIN

## 2023-05-09 ASSESSMENT — LOCATION ZONE DERM
LOCATION ZONE: FACE
LOCATION ZONE: LEG
LOCATION ZONE: NECK
LOCATION ZONE: TRUNK
LOCATION ZONE: ARM

## 2023-05-09 ASSESSMENT — LOCATION DETAILED DESCRIPTION DERM
LOCATION DETAILED: LEFT MEDIAL SUPERIOR CHEST
LOCATION DETAILED: LEFT SUPERIOR CENTRAL BUCCAL CHEEK
LOCATION DETAILED: LEFT PROXIMAL DORSAL FOREARM
LOCATION DETAILED: LEFT LATERAL SUPERIOR CHEST
LOCATION DETAILED: RIGHT ANTERIOR PROXIMAL THIGH
LOCATION DETAILED: LEFT CLAVICULAR SKIN
LOCATION DETAILED: LEFT CLAVICULAR NECK
LOCATION DETAILED: MIDDLE STERNUM
LOCATION DETAILED: SUPERIOR MID FOREHEAD
LOCATION DETAILED: RIGHT MEDIAL SUPERIOR CHEST
LOCATION DETAILED: LEFT DISTAL DORSAL FOREARM
LOCATION DETAILED: RIGHT FOREHEAD

## 2023-05-09 NOTE — PROCEDURE: LIQUID NITROGEN
Detail Level: Simple
Render Post-Care Instructions In Note?: no
Consent: The patient's consent was obtained including but not limited to risks of crusting, scabbing, blistering, scarring, darker or lighter pigmentary change, recurrence, incomplete removal and infection.
Post-Care Instructions: I reviewed with the patient in detail post-care instructions. Patient is to wear sunprotection, and avoid picking at any of the treated lesions. Pt may apply Vaseline to crusted or scabbing areas.
Show Applicator Variable?: Yes
Duration Of Freeze Thaw-Cycle (Seconds): 0
Medical Necessity Clause: This procedure was medically necessary because the lesions that were treated were:
Detail Level: Detailed
Spray Paint Text: The liquid nitrogen was applied to the skin utilizing a spray paint frosting technique.
Medical Necessity Information: It is in your best interest to select a reason for this procedure from the list below. All of these items fulfill various CMS LCD requirements except the new and changing color options.

## 2023-06-07 ENCOUNTER — APPOINTMENT (RX ONLY)
Dept: URBAN - METROPOLITAN AREA CLINIC 6 | Facility: CLINIC | Age: 73
Setting detail: DERMATOLOGY
End: 2023-06-07

## 2023-06-07 DIAGNOSIS — Z41.9 ENCOUNTER FOR PROCEDURE FOR PURPOSES OTHER THAN REMEDYING HEALTH STATE, UNSPECIFIED: ICD-10-CM

## 2023-06-07 PROCEDURE — ? COSMETIC CONSULTATION: GENERAL

## 2023-06-07 ASSESSMENT — LOCATION DETAILED DESCRIPTION DERM
LOCATION DETAILED: LEFT CHIN
LOCATION DETAILED: LEFT MEDIAL MALAR CHEEK
LOCATION DETAILED: RIGHT MEDIAL FOREHEAD
LOCATION DETAILED: RIGHT CENTRAL MALAR CHEEK

## 2023-06-07 ASSESSMENT — LOCATION SIMPLE DESCRIPTION DERM
LOCATION SIMPLE: LEFT CHEEK
LOCATION SIMPLE: RIGHT CHEEK
LOCATION SIMPLE: RIGHT FOREHEAD
LOCATION SIMPLE: CHIN

## 2023-06-07 ASSESSMENT — LOCATION ZONE DERM: LOCATION ZONE: FACE

## 2023-06-15 ENCOUNTER — APPOINTMENT (RX ONLY)
Dept: URBAN - METROPOLITAN AREA CLINIC 6 | Facility: CLINIC | Age: 73
Setting detail: DERMATOLOGY
End: 2023-06-15

## 2023-06-15 DIAGNOSIS — Z41.9 ENCOUNTER FOR PROCEDURE FOR PURPOSES OTHER THAN REMEDYING HEALTH STATE, UNSPECIFIED: ICD-10-CM

## 2023-06-15 PROCEDURE — ? FACIAL

## 2023-06-15 ASSESSMENT — LOCATION DETAILED DESCRIPTION DERM
LOCATION DETAILED: LEFT INFERIOR CENTRAL MALAR CHEEK
LOCATION DETAILED: NASAL DORSUM
LOCATION DETAILED: RIGHT CENTRAL MALAR CHEEK
LOCATION DETAILED: RIGHT LOWER CUTANEOUS LIP
LOCATION DETAILED: RIGHT INFERIOR MEDIAL FOREHEAD

## 2023-06-15 ASSESSMENT — LOCATION ZONE DERM
LOCATION ZONE: LIP
LOCATION ZONE: NOSE
LOCATION ZONE: FACE

## 2023-06-15 ASSESSMENT — LOCATION SIMPLE DESCRIPTION DERM
LOCATION SIMPLE: RIGHT LIP
LOCATION SIMPLE: LEFT CHEEK
LOCATION SIMPLE: NOSE
LOCATION SIMPLE: RIGHT CHEEK
LOCATION SIMPLE: RIGHT FOREHEAD

## 2023-06-15 NOTE — PROCEDURE: FACIAL
Extraction Method: sterile lancet
Exfoliation Type Override: pca enzyme
Detail Level: Zone
Facial Steaming: steamed
Exfoliation Type: exfoliant
Mask Type (Optional): gel based
Treatment Type (Optional): Spa Facial
Comments (Non-Sticky): Bruises easily.  Used B5 mask. Sent home with regenerative serum.

## 2023-06-27 RX ORDER — ZOLEDRONIC ACID 5 MG/100ML
5 INJECTION, SOLUTION INTRAVENOUS ONCE
OUTPATIENT
Start: 2023-07-11 | End: 2023-07-11

## 2024-03-21 RX ORDER — SODIUM CHLORIDE 9 MG/ML
INJECTION, SOLUTION INTRAVENOUS CONTINUOUS
OUTPATIENT
Start: 2024-03-21

## 2024-03-21 RX ORDER — HEPARIN SODIUM (PORCINE) LOCK FLUSH IV SOLN 100 UNIT/ML 100 UNIT/ML
500 SOLUTION INTRAVENOUS PRN
OUTPATIENT
Start: 2024-03-21

## 2024-03-21 RX ORDER — DIPHENHYDRAMINE HYDROCHLORIDE 50 MG/ML
50 INJECTION INTRAMUSCULAR; INTRAVENOUS PRN
OUTPATIENT
Start: 2024-03-21

## 2024-03-21 RX ORDER — EPINEPHRINE 1 MG/ML(1)
0.5 AMPUL (ML) INJECTION PRN
OUTPATIENT
Start: 2024-03-21

## 2024-03-21 RX ORDER — 0.9 % SODIUM CHLORIDE 0.9 %
3 VIAL (ML) INJECTION PRN
OUTPATIENT
Start: 2024-03-21

## 2024-03-21 RX ORDER — 0.9 % SODIUM CHLORIDE 0.9 %
VIAL (ML) INJECTION PRN
OUTPATIENT
Start: 2024-03-21

## 2024-03-21 RX ORDER — 0.9 % SODIUM CHLORIDE 0.9 %
10 VIAL (ML) INJECTION PRN
OUTPATIENT
Start: 2024-03-21

## 2024-03-21 RX ORDER — METHYLPREDNISOLONE SODIUM SUCCINATE 125 MG/2ML
125 INJECTION, POWDER, LYOPHILIZED, FOR SOLUTION INTRAMUSCULAR; INTRAVENOUS PRN
OUTPATIENT
Start: 2024-03-21

## 2024-05-28 ENCOUNTER — APPOINTMENT (RX ONLY)
Dept: URBAN - METROPOLITAN AREA CLINIC 6 | Facility: CLINIC | Age: 74
Setting detail: DERMATOLOGY
End: 2024-05-28

## 2024-05-28 DIAGNOSIS — L30.4 ERYTHEMA INTERTRIGO: ICD-10-CM

## 2024-05-28 DIAGNOSIS — L82.0 INFLAMED SEBORRHEIC KERATOSIS: ICD-10-CM

## 2024-05-28 DIAGNOSIS — D18.0 HEMANGIOMA: ICD-10-CM

## 2024-05-28 DIAGNOSIS — Z71.89 OTHER SPECIFIED COUNSELING: ICD-10-CM

## 2024-05-28 DIAGNOSIS — L82.1 OTHER SEBORRHEIC KERATOSIS: ICD-10-CM

## 2024-05-28 DIAGNOSIS — L57.0 ACTINIC KERATOSIS: ICD-10-CM

## 2024-05-28 DIAGNOSIS — L81.4 OTHER MELANIN HYPERPIGMENTATION: ICD-10-CM

## 2024-05-28 DIAGNOSIS — Z85.828 PERSONAL HISTORY OF OTHER MALIGNANT NEOPLASM OF SKIN: ICD-10-CM

## 2024-05-28 DIAGNOSIS — L21.8 OTHER SEBORRHEIC DERMATITIS: ICD-10-CM

## 2024-05-28 DIAGNOSIS — D22 MELANOCYTIC NEVI: ICD-10-CM

## 2024-05-28 PROBLEM — D22.62 MELANOCYTIC NEVI OF LEFT UPPER LIMB, INCLUDING SHOULDER: Status: ACTIVE | Noted: 2024-05-28

## 2024-05-28 PROBLEM — D18.01 HEMANGIOMA OF SKIN AND SUBCUTANEOUS TISSUE: Status: ACTIVE | Noted: 2024-05-28

## 2024-05-28 PROBLEM — D22.5 MELANOCYTIC NEVI OF TRUNK: Status: ACTIVE | Noted: 2024-05-28

## 2024-05-28 PROBLEM — D48.5 NEOPLASM OF UNCERTAIN BEHAVIOR OF SKIN: Status: ACTIVE | Noted: 2024-05-28

## 2024-05-28 PROBLEM — D22.61 MELANOCYTIC NEVI OF RIGHT UPPER LIMB, INCLUDING SHOULDER: Status: ACTIVE | Noted: 2024-05-28

## 2024-05-28 PROBLEM — D22.72 MELANOCYTIC NEVI OF LEFT LOWER LIMB, INCLUDING HIP: Status: ACTIVE | Noted: 2024-05-28

## 2024-05-28 PROBLEM — D22.71 MELANOCYTIC NEVI OF RIGHT LOWER LIMB, INCLUDING HIP: Status: ACTIVE | Noted: 2024-05-28

## 2024-05-28 PROCEDURE — ? RECOMMENDATIONS

## 2024-05-28 PROCEDURE — 17003 DESTRUCT PREMALG LES 2-14: CPT | Mod: 59

## 2024-05-28 PROCEDURE — ? LIQUID NITROGEN

## 2024-05-28 PROCEDURE — ? BIOPSY BY SHAVE METHOD

## 2024-05-28 PROCEDURE — ? COUNSELING

## 2024-05-28 PROCEDURE — 17110 DESTRUCTION B9 LES UP TO 14: CPT

## 2024-05-28 PROCEDURE — 17000 DESTRUCT PREMALG LESION: CPT | Mod: 59

## 2024-05-28 PROCEDURE — 11102 TANGNTL BX SKIN SINGLE LES: CPT | Mod: 59

## 2024-05-28 PROCEDURE — 99213 OFFICE O/P EST LOW 20 MIN: CPT | Mod: 25

## 2024-05-28 ASSESSMENT — LOCATION SIMPLE DESCRIPTION DERM
LOCATION SIMPLE: LEFT FOREARM
LOCATION SIMPLE: LEFT CALF
LOCATION SIMPLE: INFERIOR FOREHEAD
LOCATION SIMPLE: RIGHT CHEEK
LOCATION SIMPLE: RIGHT UPPER ARM
LOCATION SIMPLE: LEFT PRETIBIAL REGION
LOCATION SIMPLE: RIGHT THIGH
LOCATION SIMPLE: RIGHT CALF
LOCATION SIMPLE: RIGHT UPPER BACK
LOCATION SIMPLE: LEFT POSTERIOR THIGH
LOCATION SIMPLE: LEFT THIGH
LOCATION SIMPLE: LEFT LOWER BACK
LOCATION SIMPLE: RIGHT LOWER BACK
LOCATION SIMPLE: RIGHT POSTERIOR THIGH
LOCATION SIMPLE: CHEST
LOCATION SIMPLE: RIGHT FOREARM
LOCATION SIMPLE: LEFT BREAST
LOCATION SIMPLE: RIGHT BREAST
LOCATION SIMPLE: LEFT CHEEK
LOCATION SIMPLE: LEFT SHOULDER
LOCATION SIMPLE: LEFT FOREHEAD
LOCATION SIMPLE: RIGHT FOREHEAD
LOCATION SIMPLE: RIGHT SCALP
LOCATION SIMPLE: LEFT UPPER ARM
LOCATION SIMPLE: LEFT UPPER BACK
LOCATION SIMPLE: UPPER BACK

## 2024-05-28 ASSESSMENT — LOCATION DETAILED DESCRIPTION DERM
LOCATION DETAILED: RIGHT INFERIOR LATERAL MIDBACK
LOCATION DETAILED: RIGHT CENTRAL FRONTAL SCALP
LOCATION DETAILED: RIGHT PROXIMAL CALF
LOCATION DETAILED: RIGHT SUPERIOR FOREHEAD
LOCATION DETAILED: INFERIOR MID FOREHEAD
LOCATION DETAILED: RIGHT INFRAMAMMARY CREASE (INNER QUADRANT)
LOCATION DETAILED: RIGHT LATERAL FOREHEAD
LOCATION DETAILED: LEFT INFERIOR UPPER BACK
LOCATION DETAILED: LEFT LATERAL FOREHEAD
LOCATION DETAILED: RIGHT MID-UPPER BACK
LOCATION DETAILED: INFERIOR THORACIC SPINE
LOCATION DETAILED: RIGHT ANTERIOR DISTAL UPPER ARM
LOCATION DETAILED: RIGHT INFERIOR LATERAL FOREHEAD
LOCATION DETAILED: RIGHT VENTRAL DISTAL FOREARM
LOCATION DETAILED: LEFT ANTERIOR PROXIMAL THIGH
LOCATION DETAILED: RIGHT INFERIOR CENTRAL MALAR CHEEK
LOCATION DETAILED: RIGHT ANTERIOR PROXIMAL THIGH
LOCATION DETAILED: LEFT POSTERIOR SHOULDER
LOCATION DETAILED: LEFT PROXIMAL DORSAL FOREARM
LOCATION DETAILED: LEFT PROXIMAL PRETIBIAL REGION
LOCATION DETAILED: LEFT ANTERIOR DISTAL UPPER ARM
LOCATION DETAILED: RIGHT PROXIMAL DORSAL FOREARM
LOCATION DETAILED: RIGHT DISTAL POSTERIOR THIGH
LOCATION DETAILED: LEFT DISTAL POSTERIOR THIGH
LOCATION DETAILED: LEFT INFERIOR MEDIAL UPPER BACK
LOCATION DETAILED: RIGHT SUPERIOR LATERAL FOREHEAD
LOCATION DETAILED: RIGHT MEDIAL INFERIOR CHEST
LOCATION DETAILED: RIGHT SUPERIOR LATERAL MIDBACK
LOCATION DETAILED: RIGHT MEDIAL MALAR CHEEK
LOCATION DETAILED: RIGHT VENTRAL PROXIMAL FOREARM
LOCATION DETAILED: LEFT CENTRAL MALAR CHEEK
LOCATION DETAILED: LEFT PROXIMAL CALF
LOCATION DETAILED: LEFT SUPERIOR LATERAL MIDBACK
LOCATION DETAILED: LEFT VENTRAL DISTAL FOREARM
LOCATION DETAILED: LEFT INFRAMAMMARY CREASE (INNER QUADRANT)
LOCATION DETAILED: LEFT LATERAL BUCCAL CHEEK

## 2024-05-28 ASSESSMENT — LOCATION ZONE DERM
LOCATION ZONE: SCALP
LOCATION ZONE: FACE
LOCATION ZONE: LEG
LOCATION ZONE: ARM
LOCATION ZONE: TRUNK

## 2024-05-28 NOTE — PROCEDURE: COUNSELING
Detail Level: Simple
Detail Level: Zone
Detail Level: Generalized
Detail Level: Detailed
Topical Antifungals Recommendations: OTC Clotrimazole cream

## 2024-05-28 NOTE — PROCEDURE: RECOMMENDATIONS
Detail Level: Zone
Render Risk Assessment In Note?: no
Recommendation Preamble: The following recommendations were made during the visit:  continue OTC retinol and can add Vitamin C Serum. Sample given.

## 2024-05-30 ENCOUNTER — RX ONLY (OUTPATIENT)
Age: 74
Setting detail: RX ONLY
End: 2024-05-30

## 2024-05-30 RX ORDER — FLUOROURACIL 5 MG/G
CREAM TOPICAL
Qty: 40 | Refills: 0 | Status: ERX | COMMUNITY
Start: 2024-05-30

## 2024-08-20 ENCOUNTER — APPOINTMENT (RX ONLY)
Dept: URBAN - METROPOLITAN AREA CLINIC 6 | Facility: CLINIC | Age: 74
Setting detail: DERMATOLOGY
End: 2024-08-20

## 2024-08-20 PROBLEM — D04.39 CARCINOMA IN SITU OF SKIN OF OTHER PARTS OF FACE: Status: ACTIVE | Noted: 2024-08-20

## 2024-08-20 PROCEDURE — ? DIAGNOSIS COMMENT

## 2024-08-20 PROCEDURE — ? COUNSELING

## 2024-08-20 PROCEDURE — 99212 OFFICE O/P EST SF 10 MIN: CPT

## 2024-08-20 NOTE — PROCEDURE: DIAGNOSIS COMMENT
Render Risk Assessment In Note?: no
Detail Level: Simple
Comment: #[id number]. Treated with 5 fluorouracil as of 4 days ago. Possibly treated for 2 months.  Advised at this time to stop 5 fluorouracil and can apply aquaphor or Vaseline.  Will let it heal and follow up as scheduled 11/26/24.

## 2024-09-04 NOTE — PROCEDURE: BIOPSY BY SHAVE METHOD
Detail Level: Detailed
Depth Of Biopsy: dermis
Was A Bandage Applied: Yes
Size Of Lesion In Cm: 0.5
X Size Of Lesion In Cm: 0
Biopsy Type: H and E
Biopsy Method: Personna blade
Anesthesia Type: 0.5% lidocaine without epinephrine
Anesthesia Volume In Cc: 1
Hemostasis: Drysol
Wound Care: Petrolatum
Dressing: bandage
Destruction After The Procedure: No
Type Of Destruction Used: Electrodesiccation and Curettage
Curettage Text: The wound bed was treated with curettage after the biopsy was performed.
Cryotherapy Text: The wound bed was treated with cryotherapy after the biopsy was performed.
Electrodesiccation Text: The wound bed was treated with electrodesiccation after the biopsy was performed.
Electrodesiccation And Curettage Text: The wound bed was treated with electrodesiccation and curettage after the biopsy was performed. Treated x 3
Silver Nitrate Text: The wound bed was treated with silver nitrate after the biopsy was performed.
Lab: [id number]
Lab Facility: 
Consent: Written consent was obtained and risks were reviewed including but not limited to scarring, infection, bleeding, scabbing, incomplete removal, nerve damage and allergy to anesthesia.
Post-Care Instructions: I reviewed with the patient in detail post-care instructions. Patient is to keep the biopsy site dry overnight, and then apply bacitracin twice daily until healed. Patient may apply hydrogen peroxide soaks to remove any crusting.
Notification Instructions: Patient will be notified of biopsy results. However, patient instructed to call the office if not contacted within 2 weeks.
Billing Type: Third-Party Bill
Information: Selecting Yes will display possible errors in your note based on the variables you have selected. This validation is only offered as a suggestion for you. PLEASE NOTE THAT THE VALIDATION TEXT WILL BE REMOVED WHEN YOU FINALIZE YOUR NOTE. IF YOU WANT TO FAX A PRELIMINARY NOTE YOU WILL NEED TO TOGGLE THIS TO 'NO' IF YOU DO NOT WANT IT IN YOUR FAXED NOTE.
yes

## 2024-11-01 ENCOUNTER — APPOINTMENT (RX ONLY)
Dept: URBAN - METROPOLITAN AREA CLINIC 20 | Facility: CLINIC | Age: 74
Setting detail: DERMATOLOGY
End: 2024-11-01

## 2024-11-01 DIAGNOSIS — R21 RASH AND OTHER NONSPECIFIC SKIN ERUPTION: ICD-10-CM

## 2024-11-01 PROCEDURE — 11103 TANGNTL BX SKIN EA SEP/ADDL: CPT

## 2024-11-01 PROCEDURE — ? ADDITIONAL NOTES

## 2024-11-01 PROCEDURE — 99214 OFFICE O/P EST MOD 30 MIN: CPT | Mod: 25

## 2024-11-01 PROCEDURE — ? BIOPSY BY SHAVE METHOD

## 2024-11-01 PROCEDURE — ? COUNSELING

## 2024-11-01 PROCEDURE — ? PRESCRIPTION

## 2024-11-01 PROCEDURE — 11102 TANGNTL BX SKIN SINGLE LES: CPT

## 2024-11-01 RX ORDER — TRIAMCINOLONE ACETONIDE 1 MG/G
CREAM TOPICAL BID
Qty: 453.6 | Refills: 0 | Status: ERX | COMMUNITY
Start: 2024-11-01

## 2024-11-01 RX ADMIN — TRIAMCINOLONE ACETONIDE: 1 CREAM TOPICAL at 00:00

## 2024-11-01 ASSESSMENT — LOCATION ZONE DERM
LOCATION ZONE: TRUNK
LOCATION ZONE: LEG
LOCATION ZONE: ARM
LOCATION ZONE: ARM

## 2024-11-01 ASSESSMENT — LOCATION SIMPLE DESCRIPTION DERM
LOCATION SIMPLE: RIGHT FOREARM
LOCATION SIMPLE: RIGHT UPPER ARM
LOCATION SIMPLE: RIGHT THIGH
LOCATION SIMPLE: CHEST
LOCATION SIMPLE: LEFT UPPER ARM
LOCATION SIMPLE: LEFT UPPER ARM
LOCATION SIMPLE: LEFT UPPER BACK

## 2024-11-01 ASSESSMENT — LOCATION DETAILED DESCRIPTION DERM
LOCATION DETAILED: LEFT ANTERIOR PROXIMAL UPPER ARM
LOCATION DETAILED: LEFT MEDIAL UPPER BACK
LOCATION DETAILED: RIGHT ANTERIOR DISTAL THIGH
LOCATION DETAILED: LEFT MEDIAL SUPERIOR CHEST
LOCATION DETAILED: LEFT ANTERIOR DISTAL UPPER ARM
LOCATION DETAILED: RIGHT ANTERIOR DISTAL UPPER ARM
LOCATION DETAILED: RIGHT DISTAL DORSAL FOREARM

## 2024-11-01 NOTE — PROCEDURE: ADDITIONAL NOTES
Detail Level: Simple
Additional Notes: Discussed soak and grease.\\n\\nOnly use hypoallergenic products.  Handout provided.\\n\\nER precautions provided if rash worsens or develops SOB, mouth/tongue swelling.\\n\\nOk to start non sedating antihistamine daily.
Render Risk Assessment In Note?: no

## 2024-11-01 NOTE — HPI: RASH
What Type Of Note Output Would You Prefer (Optional)?: Bullet Format
How Severe Is Your Rash?: moderate
Is This A New Presentation, Or A Follow-Up?: Rash
Additional History: \\nStarted 2 weeks ago, initially itchy but has subsided.  Starts as a red flat spot, then develops scaling. No recent URI, but has hx of CF, managed by [facility].  \\n\\nSeen 2 providers since onset, prescribed meds pt is unsure name of them but they did not help.\\n\\nNo recent change in meds, soaps, supplements, detergent. \\n\\nDid stay in hotel 2 weeks ago,  stayed with her, denies similar rash.

## 2024-11-15 ENCOUNTER — APPOINTMENT (RX ONLY)
Dept: URBAN - METROPOLITAN AREA CLINIC 20 | Facility: CLINIC | Age: 74
Setting detail: DERMATOLOGY
End: 2024-11-15

## 2024-11-15 DIAGNOSIS — L40.0 PSORIASIS VULGARIS: ICD-10-CM | Status: INADEQUATELY CONTROLLED

## 2024-11-15 PROCEDURE — ? ADDITIONAL NOTES

## 2024-11-15 PROCEDURE — ? PRESCRIPTION

## 2024-11-15 PROCEDURE — 99214 OFFICE O/P EST MOD 30 MIN: CPT

## 2024-11-15 PROCEDURE — ? COUNSELING

## 2024-11-15 RX ORDER — CLOBETASOL PROPIONATE 0.5 MG/G
CREAM TOPICAL BID
Qty: 120 | Refills: 5 | Status: ERX | COMMUNITY
Start: 2024-11-15

## 2024-11-15 RX ADMIN — CLOBETASOL PROPIONATE: 0.5 CREAM TOPICAL at 00:00

## 2024-11-15 ASSESSMENT — LOCATION ZONE DERM
LOCATION ZONE: LEG
LOCATION ZONE: ARM
LOCATION ZONE: TRUNK

## 2024-11-15 ASSESSMENT — LOCATION SIMPLE DESCRIPTION DERM
LOCATION SIMPLE: RIGHT THIGH
LOCATION SIMPLE: RIGHT UPPER ARM
LOCATION SIMPLE: LEFT UPPER ARM
LOCATION SIMPLE: LEFT THIGH
LOCATION SIMPLE: ABDOMEN

## 2024-11-15 ASSESSMENT — LOCATION DETAILED DESCRIPTION DERM
LOCATION DETAILED: PERIUMBILICAL SKIN
LOCATION DETAILED: LEFT ANTERIOR LATERAL PROXIMAL UPPER ARM
LOCATION DETAILED: RIGHT ANTERIOR PROXIMAL UPPER ARM
LOCATION DETAILED: LEFT ANTERIOR PROXIMAL THIGH
LOCATION DETAILED: RIGHT ANTERIOR PROXIMAL THIGH

## 2024-11-15 NOTE — PROCEDURE: MIPS QUALITY
Quality 431: Preventive Care And Screening: Unhealthy Alcohol Use - Screening: Patient screened for unhealthy alcohol use using a single question and scores less than 2 times per year
Quality 226: Preventive Care And Screening: Tobacco Use: Screening And Cessation Intervention: Patient screened for tobacco use and is an ex/non-smoker
Quality 130: Documentation Of Current Medications In The Medical Record: Current Medications Documented
Quality 402: Tobacco Use And Help With Quitting Among Adolescents: Patient screened for tobacco and never smoked
Detail Level: Detailed
Quality 111:Pneumonia Vaccination Status For Older Adults: Pneumococcal Vaccination Previously Received

## 2024-11-15 NOTE — PROCEDURE: ADDITIONAL NOTES
Render Risk Assessment In Note?: no
Detail Level: Simple
Additional Notes: Biopsy proven [id number]
Additional Notes: Plan\\n\\n1. Increase steroid to clobetasol\\n2. Submit for UVB phototherapy 3 times per week \\n3. Send note to [facility] regarding consideration of Otezla vs Cosentyx

## 2024-11-26 ENCOUNTER — APPOINTMENT (RX ONLY)
Dept: URBAN - METROPOLITAN AREA CLINIC 6 | Facility: CLINIC | Age: 74
Setting detail: DERMATOLOGY
End: 2024-11-26

## 2024-11-26 DIAGNOSIS — L81.4 OTHER MELANIN HYPERPIGMENTATION: ICD-10-CM

## 2024-11-26 DIAGNOSIS — L30.4 ERYTHEMA INTERTRIGO: ICD-10-CM

## 2024-11-26 DIAGNOSIS — L82.1 OTHER SEBORRHEIC KERATOSIS: ICD-10-CM

## 2024-11-26 DIAGNOSIS — Z85.828 PERSONAL HISTORY OF OTHER MALIGNANT NEOPLASM OF SKIN: ICD-10-CM

## 2024-11-26 DIAGNOSIS — D22 MELANOCYTIC NEVI: ICD-10-CM

## 2024-11-26 DIAGNOSIS — L40.0 PSORIASIS VULGARIS: ICD-10-CM

## 2024-11-26 DIAGNOSIS — Z71.89 OTHER SPECIFIED COUNSELING: ICD-10-CM

## 2024-11-26 DIAGNOSIS — D18.0 HEMANGIOMA: ICD-10-CM

## 2024-11-26 DIAGNOSIS — L82.0 INFLAMED SEBORRHEIC KERATOSIS: ICD-10-CM

## 2024-11-26 PROBLEM — D18.01 HEMANGIOMA OF SKIN AND SUBCUTANEOUS TISSUE: Status: ACTIVE | Noted: 2024-11-26

## 2024-11-26 PROBLEM — D22.62 MELANOCYTIC NEVI OF LEFT UPPER LIMB, INCLUDING SHOULDER: Status: ACTIVE | Noted: 2024-11-26

## 2024-11-26 PROBLEM — D22.72 MELANOCYTIC NEVI OF LEFT LOWER LIMB, INCLUDING HIP: Status: ACTIVE | Noted: 2024-11-26

## 2024-11-26 PROBLEM — D48.5 NEOPLASM OF UNCERTAIN BEHAVIOR OF SKIN: Status: ACTIVE | Noted: 2024-11-26

## 2024-11-26 PROBLEM — D22.71 MELANOCYTIC NEVI OF RIGHT LOWER LIMB, INCLUDING HIP: Status: ACTIVE | Noted: 2024-11-26

## 2024-11-26 PROBLEM — D22.5 MELANOCYTIC NEVI OF TRUNK: Status: ACTIVE | Noted: 2024-11-26

## 2024-11-26 PROBLEM — D22.61 MELANOCYTIC NEVI OF RIGHT UPPER LIMB, INCLUDING SHOULDER: Status: ACTIVE | Noted: 2024-11-26

## 2024-11-26 PROCEDURE — 17111 DESTRUCTION B9 LESIONS 15/>: CPT

## 2024-11-26 PROCEDURE — ? LIQUID NITROGEN

## 2024-11-26 PROCEDURE — ? BIOPSY BY SHAVE METHOD

## 2024-11-26 PROCEDURE — ? RECOMMENDATIONS

## 2024-11-26 PROCEDURE — 11103 TANGNTL BX SKIN EA SEP/ADDL: CPT

## 2024-11-26 PROCEDURE — 99213 OFFICE O/P EST LOW 20 MIN: CPT | Mod: 25

## 2024-11-26 PROCEDURE — 11102 TANGNTL BX SKIN SINGLE LES: CPT | Mod: 59

## 2024-11-26 PROCEDURE — ? COUNSELING

## 2024-11-26 PROCEDURE — ? ADDITIONAL NOTES

## 2024-11-26 ASSESSMENT — LOCATION DETAILED DESCRIPTION DERM
LOCATION DETAILED: LEFT PROXIMAL CALF
LOCATION DETAILED: RIGHT ANTERIOR SHOULDER
LOCATION DETAILED: SUPERIOR THORACIC SPINE
LOCATION DETAILED: RIGHT MEDIAL BREAST 3-4:00 REGION
LOCATION DETAILED: RIGHT INFERIOR UPPER BACK
LOCATION DETAILED: LEFT MEDIAL BREAST 7-8:00 REGION
LOCATION DETAILED: RIGHT SUPERIOR LATERAL MIDBACK
LOCATION DETAILED: RIGHT MID-UPPER BACK
LOCATION DETAILED: RIGHT MEDIAL INFERIOR CHEST
LOCATION DETAILED: RIGHT LATERAL PROXIMAL UPPER ARM
LOCATION DETAILED: LEFT PROXIMAL DORSAL FOREARM
LOCATION DETAILED: RIGHT SUPERIOR MEDIAL MIDBACK
LOCATION DETAILED: RIGHT SUPERIOR MEDIAL UPPER BACK
LOCATION DETAILED: RIGHT ANTERIOR PROXIMAL THIGH
LOCATION DETAILED: LEFT CENTRAL MALAR CHEEK
LOCATION DETAILED: RIGHT VENTRAL DISTAL FOREARM
LOCATION DETAILED: LEFT INFERIOR MEDIAL MIDBACK
LOCATION DETAILED: LEFT PROXIMAL POSTERIOR UPPER ARM
LOCATION DETAILED: LEFT MID-UPPER BACK
LOCATION DETAILED: INFERIOR THORACIC SPINE
LOCATION DETAILED: RIGHT ANTERIOR PROXIMAL UPPER ARM
LOCATION DETAILED: RIGHT LATERAL UPPER BACK
LOCATION DETAILED: LEFT ANTERIOR DISTAL UPPER ARM
LOCATION DETAILED: RIGHT ANTERIOR DISTAL UPPER ARM
LOCATION DETAILED: RIGHT LATERAL BREAST 6-7:00 REGION
LOCATION DETAILED: LEFT MEDIAL BREAST 10-11:00 REGION
LOCATION DETAILED: LEFT SUPERIOR LATERAL MIDBACK
LOCATION DETAILED: LEFT ANTERIOR PROXIMAL THIGH
LOCATION DETAILED: LEFT DISTAL POSTERIOR THIGH
LOCATION DETAILED: RIGHT PROXIMAL CALF
LOCATION DETAILED: LEFT POSTERIOR SHOULDER
LOCATION DETAILED: LEFT SUPERIOR UPPER BACK
LOCATION DETAILED: RIGHT SUPERIOR LATERAL UPPER BACK
LOCATION DETAILED: LEFT VENTRAL DISTAL FOREARM
LOCATION DETAILED: RIGHT INFERIOR LATERAL MIDBACK
LOCATION DETAILED: RIGHT INFERIOR LATERAL UPPER BACK
LOCATION DETAILED: RIGHT VENTRAL PROXIMAL FOREARM
LOCATION DETAILED: PERIUMBILICAL SKIN
LOCATION DETAILED: LEFT LATERAL SUPERIOR CHEST
LOCATION DETAILED: RIGHT DISTAL POSTERIOR THIGH
LOCATION DETAILED: RIGHT INFERIOR MEDIAL MIDBACK
LOCATION DETAILED: LEFT ANTERIOR LATERAL PROXIMAL UPPER ARM
LOCATION DETAILED: RIGHT MEDIAL SUPERIOR CHEST
LOCATION DETAILED: LEFT INFERIOR LATERAL MIDBACK
LOCATION DETAILED: RIGHT PROXIMAL DORSAL FOREARM
LOCATION DETAILED: LEFT INFERIOR UPPER BACK
LOCATION DETAILED: RIGHT POSTERIOR SHOULDER
LOCATION DETAILED: LEFT MEDIAL SUPERIOR CHEST
LOCATION DETAILED: RIGHT INFERIOR MEDIAL UPPER BACK

## 2024-11-26 ASSESSMENT — LOCATION SIMPLE DESCRIPTION DERM
LOCATION SIMPLE: RIGHT THIGH
LOCATION SIMPLE: ABDOMEN
LOCATION SIMPLE: LEFT CALF
LOCATION SIMPLE: RIGHT UPPER BACK
LOCATION SIMPLE: RIGHT SHOULDER
LOCATION SIMPLE: RIGHT POSTERIOR THIGH
LOCATION SIMPLE: RIGHT UPPER ARM
LOCATION SIMPLE: CHEST
LOCATION SIMPLE: LEFT UPPER ARM
LOCATION SIMPLE: LEFT CHEEK
LOCATION SIMPLE: LEFT FOREARM
LOCATION SIMPLE: RIGHT CALF
LOCATION SIMPLE: LEFT THIGH
LOCATION SIMPLE: LEFT POSTERIOR THIGH
LOCATION SIMPLE: RIGHT FOREARM
LOCATION SIMPLE: RIGHT BREAST
LOCATION SIMPLE: LEFT POSTERIOR UPPER ARM
LOCATION SIMPLE: LEFT LOWER BACK
LOCATION SIMPLE: RIGHT BACK
LOCATION SIMPLE: UPPER BACK
LOCATION SIMPLE: LEFT UPPER BACK
LOCATION SIMPLE: LEFT SHOULDER
LOCATION SIMPLE: LEFT BREAST
LOCATION SIMPLE: RIGHT LOWER BACK

## 2024-11-26 ASSESSMENT — LOCATION ZONE DERM
LOCATION ZONE: LEG
LOCATION ZONE: TRUNK
LOCATION ZONE: ARM
LOCATION ZONE: FACE

## 2024-11-26 NOTE — PROCEDURE: ADDITIONAL NOTES
Render Risk Assessment In Note?: no
Detail Level: Simple
Additional Notes: Biopsy proven [id number]
Additional Notes: Improved from last visit. Restart triamcinolone to affected areas on arms and legs. Will hold off on UVB for now. If worsening will reconsider this.

## 2024-11-26 NOTE — PROCEDURE: BIOPSY BY SHAVE METHOD
Detail Level: Detailed
Depth Of Biopsy: dermis
Was A Bandage Applied: Yes
Size Of Lesion In Cm: 1
X Size Of Lesion In Cm: 0
Biopsy Type: H and E
Biopsy Method: Dermablade
Anesthesia Type: 1% lidocaine with epinephrine
Anesthesia Volume In Cc: 0.5
Hemostasis: Drysol
Wound Care: Petrolatum
Dressing: bandage
Destruction After The Procedure: No
Type Of Destruction Used: Electrodesiccation and Curettage
Curettage Text: The wound bed was treated with curettage after the biopsy was performed.
Cryotherapy Text: The wound bed was treated with cryotherapy after the biopsy was performed.
Electrodesiccation Text: The wound bed was treated with electrodesiccation after the biopsy was performed.
Electrodesiccation And Curettage Text: The wound bed was treated with electrodesiccation and curettage after the biopsy was performed. Treated x 3
Silver Nitrate Text: The wound bed was treated with silver nitrate after the biopsy was performed.
Lab: [id number]
Lab Facility: 
Consent: Written consent was obtained and risks were reviewed including but not limited to scarring, infection, bleeding, scabbing, incomplete removal, nerve damage and allergy to anesthesia.
Post-Care Instructions: I reviewed with the patient in detail post-care instructions. Patient is to keep the biopsy site dry overnight, and then apply bacitracin twice daily until healed. Patient may apply hydrogen peroxide soaks to remove any crusting.
Notification Instructions: Patient will be notified of biopsy results. However, patient instructed to call the office if not contacted within 2 weeks.
Billing Type: Third-Party Bill
Information: Selecting Yes will display possible errors in your note based on the variables you have selected. This validation is only offered as a suggestion for you. PLEASE NOTE THAT THE VALIDATION TEXT WILL BE REMOVED WHEN YOU FINALIZE YOUR NOTE. IF YOU WANT TO FAX A PRELIMINARY NOTE YOU WILL NEED TO TOGGLE THIS TO 'NO' IF YOU DO NOT WANT IT IN YOUR FAXED NOTE.
Size Of Lesion In Cm: 0.8

## 2024-11-26 NOTE — PROCEDURE: LIQUID NITROGEN
Spray Paint Technique: No
Show Applicator Variable?: Yes
Spray Paint Text: The liquid nitrogen was applied to the skin utilizing a spray paint frosting technique.
Medical Necessity Clause: This procedure was medically necessary because the lesions that were treated were:
Medical Necessity Information: It is in your best interest to select a reason for this procedure from the list below. All of these items fulfill various CMS LCD requirements except the new and changing color options.
Consent: The patient's consent was obtained including but not limited to risks of crusting, scabbing, blistering, scarring, darker or lighter pigmentary change, recurrence, incomplete removal and infection.
Detail Level: Detailed
Post-Care Instructions: I reviewed with the patient in detail post-care instructions. Patient is to wear sunprotection, and avoid picking at any of the treated lesions. Pt may apply Vaseline to crusted or scabbing areas.

## 2025-04-18 ENCOUNTER — OFFICE VISIT (OUTPATIENT)
Dept: CARDIOLOGY | Facility: MEDICAL CENTER | Age: 75
End: 2025-04-18
Attending: INTERNAL MEDICINE
Payer: MEDICARE

## 2025-04-18 VITALS
WEIGHT: 193 LBS | HEART RATE: 74 BPM | OXYGEN SATURATION: 96 % | RESPIRATION RATE: 16 BRPM | BODY MASS INDEX: 34.2 KG/M2 | DIASTOLIC BLOOD PRESSURE: 70 MMHG | SYSTOLIC BLOOD PRESSURE: 110 MMHG | HEIGHT: 63 IN

## 2025-04-18 DIAGNOSIS — D68.69 HYPERCOAGULABLE STATE DUE TO PERSISTENT ATRIAL FIBRILLATION (HCC): ICD-10-CM

## 2025-04-18 DIAGNOSIS — I10 PRIMARY HYPERTENSION: ICD-10-CM

## 2025-04-18 DIAGNOSIS — I48.19 PERSISTENT ATRIAL FIBRILLATION (HCC): ICD-10-CM

## 2025-04-18 DIAGNOSIS — I48.91 ATRIAL FIBRILLATION, UNSPECIFIED TYPE (HCC): ICD-10-CM

## 2025-04-18 DIAGNOSIS — I50.32 CHF (CONGESTIVE HEART FAILURE), NYHA CLASS II, CHRONIC, DIASTOLIC (HCC): ICD-10-CM

## 2025-04-18 DIAGNOSIS — I48.19 HYPERCOAGULABLE STATE DUE TO PERSISTENT ATRIAL FIBRILLATION (HCC): ICD-10-CM

## 2025-04-18 LAB — EKG IMPRESSION: NORMAL

## 2025-04-18 PROCEDURE — 3078F DIAST BP <80 MM HG: CPT | Performed by: INTERNAL MEDICINE

## 2025-04-18 PROCEDURE — 3074F SYST BP LT 130 MM HG: CPT | Performed by: INTERNAL MEDICINE

## 2025-04-18 PROCEDURE — 93010 ELECTROCARDIOGRAM REPORT: CPT | Performed by: INTERNAL MEDICINE

## 2025-04-18 PROCEDURE — 99213 OFFICE O/P EST LOW 20 MIN: CPT | Performed by: INTERNAL MEDICINE

## 2025-04-18 PROCEDURE — 93005 ELECTROCARDIOGRAM TRACING: CPT | Mod: TC | Performed by: INTERNAL MEDICINE

## 2025-04-18 PROCEDURE — 99214 OFFICE O/P EST MOD 30 MIN: CPT | Performed by: INTERNAL MEDICINE

## 2025-04-18 PROCEDURE — G2211 COMPLEX E/M VISIT ADD ON: HCPCS | Performed by: INTERNAL MEDICINE

## 2025-04-18 RX ORDER — METOPROLOL SUCCINATE 25 MG/1
75 TABLET, EXTENDED RELEASE ORAL DAILY
COMMUNITY
Start: 2025-02-03

## 2025-04-18 RX ORDER — VALSARTAN 320 MG/1
320 TABLET ORAL DAILY
COMMUNITY
End: 2025-04-18

## 2025-04-18 RX ORDER — CHLORTHALIDONE 25 MG/1
25 TABLET ORAL DAILY
COMMUNITY
End: 2025-04-18

## 2025-04-18 RX ORDER — FUROSEMIDE 40 MG/1
40 TABLET ORAL
COMMUNITY
Start: 2025-02-19 | End: 2025-04-18 | Stop reason: SDUPTHER

## 2025-04-18 RX ORDER — B-COMPLEX WITH VITAMIN C
1 TABLET ORAL DAILY
COMMUNITY

## 2025-04-18 RX ORDER — POTASSIUM CHLORIDE 1500 MG/1
20 TABLET, EXTENDED RELEASE ORAL
Qty: 90 TABLET | Refills: 3 | Status: SHIPPED | OUTPATIENT
Start: 2025-04-18

## 2025-04-18 RX ORDER — DIGOXIN 125 MCG
0.12 TABLET ORAL DAILY
COMMUNITY
End: 2025-04-18

## 2025-04-18 RX ORDER — FUROSEMIDE 20 MG/1
20 TABLET ORAL DAILY
Qty: 90 TABLET | Refills: 3 | Status: SHIPPED | OUTPATIENT
Start: 2025-04-18

## 2025-04-18 RX ORDER — POTASSIUM CHLORIDE 1500 MG/1
20 TABLET, EXTENDED RELEASE ORAL DAILY
COMMUNITY
Start: 2025-03-07 | End: 2025-04-18 | Stop reason: SDUPTHER

## 2025-04-18 NOTE — PROGRESS NOTES
Chief Complaint   Patient presents with    Atrial Fibrillation       Ronan Petit is a 74 y.o. female who presents today  in consultation from Mona Aguayo MD or evaluation of afib since 2/2025 complicate by CHF with h/o CHF    Records from [facility] reviewed has pAF on metoprolol    Past Medical History:   Diagnosis Date    Breath shortness     related to cystic fibrosis/ pt has frequent coughing    Bronchitis 2010    Cancer (HCC) 2015    skin ca    Coughing blood     intermittent cough unsure if allergies    Cystic fibrosis (HCC)     Diabetes (HCC)     diet controlled    Diverticulitis     Heart burn     Indigestion     Other specified disorder of intestines     diverticuli    Other specified symptom associated with female genital organs     pelvic prolapse    Pain     abdominal    Pain     hip     Pneumonia 06/2015     Past Surgical History:   Procedure Laterality Date    VENTRAL HERNIA REPAIR ROBOTIC XI N/A 2/26/2020    Procedure: REPAIR, HERNIA, VENTRAL, ROBOT-ASSISTED, USING DA JEREMY XI- FOR RECURRENT REDUCIBLE INCISIONAL HERNIA WITH MESH;  Surgeon: John H Ganser, M.D.;  Location: [facility];  Service: General    HERNIA REPAIR  4/4/2016    Procedure: Abdominal wall HERNIA REPAIR  ;  Surgeon: Brandt Munson Jr., M.D.;  Location: [facility];  Service:     FLAP FREE Bilateral 4/4/2016    Procedure: bilateral muscle flap;  Surgeon: Brandt Munson Jr., M.D.;  Location: [facility];  Service:     LYSIS ADHESIONS GENERAL  4/4/2016    Procedure: LYSIS ADHESIONS GENERAL;  Surgeon: Brandt Munson Jr., M.D.;  Location: [facility];  Service:     COLOSTOMY TAKEDOWN  9/7/2015    Procedure: COLOSTOMY REVERSAL;  Surgeon: Jessica Flores M.D.;  Location: SURGERY [facility];  Service:     COLOSTOMY  6/22/2015    Procedure: COLOSTOMY POSSIBLE;  Surgeon: Jessica Flores M.D.;  Location: SURGERY [facility];  Service:     COLON RESECTION  LAPAROSCOPIC  2015    Procedure: COLON RESECTION LAPAROSCOPIC TO OPEN SIGMOID COLECTOMY;  Surgeon: Jessica Flores M.D.;  Location: SURGERY [facility];  Service:     CHOLECYSTECTOMY      OTHER      tonsils     Family History   Problem Relation Age of Onset    Cancer Mother     Alcohol abuse Father     Cancer Daughter      Social History     Socioeconomic History    Marital status:      Spouse name: Not on file    Number of children: Not on file    Years of education: Not on file    Highest education level: Not on file   Occupational History    Not on file   Tobacco Use    Smoking status: Former     Current packs/day: 0.00     Average packs/day: 1 pack/day for 20.0 years (20.0 ttl pk-yrs)     Types: Cigarettes     Start date: 1964     Quit date: 1984     Years since quittin.3    Smokeless tobacco: Never   Vaping Use    Vaping status: Never Used   Substance and Sexual Activity    Alcohol use: Yes     Alcohol/week: 1.8 oz     Types: 3 Standard drinks or equivalent per week    Drug use: No    Sexual activity: Not on file   Other Topics Concern    Not on file   Social History Narrative    Not on file     Social Drivers of Health     Financial Resource Strain: Not on file   Food Insecurity: Not on file   Transportation Needs: Not on file   Physical Activity: Not on file   Stress: Not on file   Social Connections: Not on file   Intimate Partner Violence: Not on file   Housing Stability: Not on file     Allergies   Allergen Reactions    Albuterol      jittery    Ambien [Zolpidem]      Memory loss    Bee Venom     Flagyl [Metronidazole] Vomiting     Severe nausea and vomiting      Sulfa Drugs      unknown    Tape      Paper tape is ok     Outpatient Encounter Medications as of 2025   Medication Sig Dispense Refill    metoprolol SR (TOPROL XL) 25 MG TABLET SR 24 HR Take 75 mg by mouth every day.      furosemide (LASIX) 40 MG Tab Take 40 mg by mouth.      apixaban (ELIQUIS) 5mg Tab Take 5  "mg by mouth.      potassium Chloride ER (K-TAB) 20 MEQ Tab CR tablet Take 20 mEq by mouth every day.      chlorthalidone (HYGROTON) 25 MG Tab Take 25 mg by mouth every day.      digoxin (LANOXIN) 125 MCG Tab Take 0.125 mg by mouth every day.      valsartan (DIOVAN) 320 MG tablet Take 320 mg by mouth every day.      vitamin D3 (CHOLECALCIFEROL) 400 UNIT Tab Take 1,000 Units by mouth every day.      B Complex-C (VITAMIN B + C COMPLEX) Tab Take 1 Tablet by mouth every day.      Elkwktmy-Xaiqwqn-Qzpkti&Ivacaf (TRIKAFTA) 100-50-75 & 150 MG Tablet Therapy Pack Take  by mouth.      omeprazole (PRILOSEC OTC) 20 MG tablet Take 20 mg by mouth every day.      HYDROcodone-acetaminophen (NORCO) 5-325 MG Tab per tablet Take 1 Tab by mouth 1 time daily as needed.      Pancrelipase, Lip-Prot-Amyl, (CREON) 21475 units Cap DR Particles Take 2 tabs PO w/ meals TID & 1 tab PO w/ snacks TID, 9 cap/day, 270 caps per month, & 810 caps per 90 days, please disp 90 days if possible      diazepam (VALIUM) 2 MG Tab Take 4 mg by mouth 1 time daily as needed.       MG Cap Take 100 mg by mouth. (Patient not taking: Reported on 4/18/2025)      levalbuterol (XOPENEX HFA) 45 MCG/ACT inhaler Inhale 2 Puffs by mouth every four hours as needed for Shortness of Breath. (Patient not taking: Reported on 4/18/2025) 1 Inhaler 11     No facility-administered encounter medications on file as of 4/18/2025.     ROS           Objective     /70 (BP Location: Left arm, Patient Position: Sitting, BP Cuff Size: Adult)   Pulse 74   Resp 16   Ht 1.6 m (5' 3\")   Wt 87.5 kg (193 lb)   LMP 01/04/2006   SpO2 96%   BMI 34.19 kg/m²     Physical Exam  Constitutional:       General: She is not in acute distress.     Appearance: She is not diaphoretic.   Eyes:      General: No scleral icterus.  Neck:      Vascular: No JVD.   Cardiovascular:      Rate and Rhythm: Normal rate.      Heart sounds: Normal heart sounds. No murmur heard.     No friction rub. No " gallop.   Pulmonary:      Effort: No respiratory distress.      Breath sounds: No wheezing or rales.   Abdominal:      General: Bowel sounds are normal.      Palpations: Abdomen is soft.   Musculoskeletal:      Right lower leg: No edema.      Left lower leg: No edema.   Skin:     Findings: No rash.   Neurological:      Mental Status: She is alert. Mental status is at baseline.   Psychiatric:         Mood and Affect: Mood normal.                        Assessment & Plan     1. Atrial fibrillation, unspecified type (HCC)  EKG      2. Persistent atrial fibrillation (HCC)        3. Hypercoagulable state due to persistent atrial fibrillation (HCC)        4. CHF (congestive heart failure), NYHA class II, chronic, diastolic (HCC)  furosemide (LASIX) 20 MG Tab    COMP METABOLIC PANEL    potassium Chloride ER (K-TAB) 20 MEQ Tab CR tablet      5. Primary hypertension            Medical Decision Making: Today's Assessment/Status/Plan:        It was my pleasure to meet with Ms. Petit.    Blood pressure is well controlled.  She will continue to monitor and eat hearty healthy diet.      We addressed the management of atrial fibrillation.  She is on proper anticoagulation and rate or rhythm control as appropriate.  We addressed the potential side effects and laboratory follow-up for these medications.    We addressed the management of chronic anticoagulation at today's visit. She understands the risks and benefits of chronic anticoagulation.  We reviewed and verified the results of annual testing for anemia and kidney function.      We addressed the management of congestive heart failure. We addressed the potential side effects and laboratory follow-up for these medications. She is on proper mineralacorticoid, angiotensin/ace inhibition with neprilysin inhibition, SGLTs inhibitor and beta-blockers if appropriate.  We addressed the potential side effects and regular laboratory follow-up for these medications.      Could always add  judith        I will see Ms. Petit back in 1 year time and encouraged her to follow up with us over the phone or electronically using my MyChart as issues arise.    It is my pleasure to participate in the care of Ms. Petit.  Please do not hesitate to contact me with questions or concerns.    Jamel Sinha MD PhD [facility]  Cardiologist [facility]    Please note that this dictation was created using voice recognition software. There may be errors I did not discover before finalizing the note.     () Today's E/M visit is associated with medical care services that serve as the continuing focal point for all needed health care services and/or with medical care services that  are part of ongoing care related to a patient's single, serious condition, or a complex condition: This includes  furnishing services to patients on an ongoing basis that result in care that is personalized  to the patient. The services result in a comprehensive, longitudinal, and continuous  relationship with the patient and involve delivery of team-based care that is accessible, coordinated with other practitioners and providers, and integrated with the broader health  care landscape.

## 2025-04-18 NOTE — Clinical Note
[facility] - [facility] by [facility]   [address]  [place] [address]  Phone: [phone]  Fax: [phone]              Betina Petit  1950    Encounter Date: 4/18/2025    Jamel Sinha M.D.          PROGRESS NOTE:  No notes on file      Pcp Pt States None  Via

## 2025-04-18 NOTE — PATIENT INSTRUCTIONS
You should take daily furosemide     Your weight should be less than 185 at home with minimal clothes and after emptying the bladder, check this each morning     If it is over 185 you need to take an extra furosemide and potassium    Sodium should be equal to calories avoid foods that are more double sodium to calories    Please work on increasing these foods in your diet to increase your potassium levels    Highest potassium foods  Dried figs, molasses, seaweed    High potassium foods  Dried fruits (dates, prunes), nuts, avocados, bran cereal, wheat germ, lima beans    Potassium-rich food  Vegetables-spinach, tomatoes, broccoli, winter squash, beets, carrots, cauliflower, potatoes    Fruits-bananas, cantaloupe, kiwis, oranges, mangoes    Meats-ground beef, steak, pork, veal, lamb    High potassium diet adapted from Carol MALONEY. Hypokalemia. [place] Journal of Medicine 1998; 339:451.     Work on at least 1.5 hours a week of moderate exercise (typical brisk walking or similar activity)    Please look into the following diets and incorporate them into your diet    LOW SALT DIET   KEEP YOUR SODIUM EQUAL TO CALORIES AND NO MORE THAN DOUBLE THE CALORIES FOR A LOW SALT DIET    Cardiosmart.org - great resource for American College of Cardiology on heart disease prevention and treatment    Please work on increasing these foods in your diet to increase your potassium levels    Highest potassium foods  Dried figs, molasses, seaweed    High potassium foods  Dried fruits (dates, prunes), nuts, avocados, bran cereal, wheat germ, lima beans    Potassium-rich food  Vegetables-spinach, tomatoes, broccoli, winter squash, beets, carrots, cauliflower, potatoes    Fruits-bananas, cantaloupe, kiwis, oranges, mangoes    Meats-ground beef, steak, pork, veal, lamb    *Adapted: Carol MALONEY. Hypokalemia. [place] Journal of Medicine 1998; 339:451.     Consider Makenzie (https://[email or web address].com/kardiamobile/) $ DO NOT SUBSCRIBE WE WILL  ALWAYS REVIEW YOUR TRACINGS ON MYCHART or Smartwatch such as Apple Watch $$$$ for monitoring of the heart palpitations.  This is a small device that syncs to your phone with Bluetooth that can tell you your heart rhythm.  You can send us a screencapture of the tracings with [employer].        Typical Patch Monitor looks like this [employer] or "[employer]      Alternatively consider a pulse oximeter and let us know if Oxygen is <90 or pulse is <50 or > 110 while resting

## 2025-05-14 ENCOUNTER — TELEPHONE (OUTPATIENT)
Dept: CARDIOLOGY | Facility: MEDICAL CENTER | Age: 75
End: 2025-05-14
Payer: MEDICARE

## 2025-05-15 NOTE — TELEPHONE ENCOUNTER
Phone Number Called: [phone]    Call outcome: Left detailed message for patient. Informed to call back with any additional questions.    Message: Called to inform pt that we still need CMP due to diuretic and potassium rx.

## 2025-06-02 ENCOUNTER — TELEPHONE (OUTPATIENT)
Dept: CARDIOLOGY | Facility: MEDICAL CENTER | Age: 75
End: 2025-06-02
Payer: MEDICARE

## 2025-06-02 NOTE — TELEPHONE ENCOUNTER
The most recent labs I see are from March and I do not see anything alarming it appears most labs are within normal range except she had one low albumin level

## 2025-06-02 NOTE — TELEPHONE ENCOUNTER
CW    Caller: Betina Petit     Topic/issue: Test Results    Patient had results sent in from [place] campus and is asking for a call back to discuss them. Please advise    Callback Number: [phone]     Thank you  Xiomara MACK

## 2025-06-02 NOTE — TELEPHONE ENCOUNTER
CW: Pt has extensive lab work from [place] and asks if you could review for any concerning results.   ============================    Phone Number Called:  [phone]     Call outcome: Spoke to patient regarding message below.    Message: Called to discuss. Pt has not been consistently taking lasix due to activity commitments but she will resume as well as potassium supplementation. No BLE swelling but when she does take the medication, she know it removes extra fluid. She will complete CMP lab order prior to OV with CW 7/18/25.

## 2025-06-03 NOTE — TELEPHONE ENCOUNTER
Phone Number Called: [phone]    Call outcome: Left detailed message for patient. Informed to call back with any additional questions.    Message: Called to update on labs reviewed by [facility]

## 2025-06-04 RX ORDER — 0.9 % SODIUM CHLORIDE 0.9 %
3 VIAL (ML) INJECTION PRN
Status: CANCELLED | OUTPATIENT
Start: 2025-06-05

## 2025-06-04 RX ORDER — 0.9 % SODIUM CHLORIDE 0.9 %
10 VIAL (ML) INJECTION PRN
Status: CANCELLED | OUTPATIENT
Start: 2025-06-05

## 2025-06-04 RX ORDER — MEPERIDINE HYDROCHLORIDE 25 MG/ML
25 INJECTION INTRAMUSCULAR; INTRAVENOUS; SUBCUTANEOUS PRN
Status: CANCELLED | OUTPATIENT
Start: 2025-06-05

## 2025-06-04 RX ORDER — ZOLEDRONIC ACID 0.05 MG/ML
5 INJECTION, SOLUTION INTRAVENOUS ONCE
Status: CANCELLED | OUTPATIENT
Start: 2025-06-05 | End: 2025-06-05

## 2025-06-04 RX ORDER — ALBUTEROL SULFATE 5 MG/ML
2.5 SOLUTION RESPIRATORY (INHALATION) PRN
Status: CANCELLED | OUTPATIENT
Start: 2025-06-05

## 2025-06-04 RX ORDER — EPINEPHRINE 1 MG/ML(1)
0.5 AMPUL (ML) INJECTION PRN
Status: CANCELLED | OUTPATIENT
Start: 2025-06-05

## 2025-06-04 RX ORDER — 0.9 % SODIUM CHLORIDE 0.9 %
VIAL (ML) INJECTION PRN
Status: CANCELLED | OUTPATIENT
Start: 2025-06-05

## 2025-06-04 RX ORDER — DIPHENHYDRAMINE HYDROCHLORIDE 50 MG/ML
25 INJECTION, SOLUTION INTRAMUSCULAR; INTRAVENOUS PRN
Status: CANCELLED | OUTPATIENT
Start: 2025-06-05

## 2025-06-04 RX ORDER — ONDANSETRON 8 MG/1
8 TABLET, ORALLY DISINTEGRATING ORAL PRN
Status: CANCELLED | OUTPATIENT
Start: 2025-06-05

## 2025-06-04 RX ORDER — SODIUM CHLORIDE 9 MG/ML
1000 INJECTION, SOLUTION INTRAVENOUS PRN
Status: CANCELLED | OUTPATIENT
Start: 2025-06-05

## 2025-06-04 RX ORDER — ACETAMINOPHEN 325 MG/1
650 TABLET ORAL PRN
Status: CANCELLED | OUTPATIENT
Start: 2025-06-05

## 2025-06-04 RX ORDER — SODIUM CHLORIDE 9 MG/ML
INJECTION, SOLUTION INTRAVENOUS CONTINUOUS
Status: CANCELLED | OUTPATIENT
Start: 2025-06-05

## 2025-06-04 RX ORDER — LORAZEPAM 0.5 MG/1
0.5 TABLET ORAL PRN
Status: CANCELLED | OUTPATIENT
Start: 2025-06-05

## 2025-06-04 RX ORDER — ONDANSETRON 2 MG/ML
8 INJECTION INTRAMUSCULAR; INTRAVENOUS PRN
Status: CANCELLED | OUTPATIENT
Start: 2025-06-05

## 2025-06-04 RX ORDER — METHYLPREDNISOLONE SODIUM SUCCINATE 125 MG/2ML
125 INJECTION, POWDER, LYOPHILIZED, FOR SOLUTION INTRAMUSCULAR; INTRAVENOUS PRN
Status: CANCELLED | OUTPATIENT
Start: 2025-06-05

## 2025-06-25 ENCOUNTER — OUTPATIENT INFUSION SERVICES (OUTPATIENT)
Facility: MEDICAL CENTER | Age: 75
End: 2025-06-25
Attending: SPECIALIST
Payer: MEDICARE

## 2025-06-25 VITALS
DIASTOLIC BLOOD PRESSURE: 86 MMHG | TEMPERATURE: 98 F | BODY MASS INDEX: 37.04 KG/M2 | RESPIRATION RATE: 18 BRPM | HEART RATE: 83 BPM | SYSTOLIC BLOOD PRESSURE: 134 MMHG | OXYGEN SATURATION: 94 % | WEIGHT: 196.21 LBS | HEIGHT: 61 IN

## 2025-06-25 DIAGNOSIS — M81.0 AGE-RELATED OSTEOPOROSIS WITHOUT CURRENT PATHOLOGICAL FRACTURE: Primary | ICD-10-CM

## 2025-06-25 LAB
CA-I SERPL-SCNC: 1.19 MMOL/L (ref 1.1–1.3)
CREAT SERPL-MCNC: 0.96 MG/DL (ref 0.5–1.4)
GFR SERPLBLD CREATININE-BSD FMLA CKD-EPI: 61 ML/MIN/1.73 M 2

## 2025-06-25 PROCEDURE — 82565 ASSAY OF CREATININE: CPT

## 2025-06-25 PROCEDURE — 82330 ASSAY OF CALCIUM: CPT

## 2025-06-25 PROCEDURE — 96365 THER/PROPH/DIAG IV INF INIT: CPT

## 2025-06-25 PROCEDURE — 36415 COLL VENOUS BLD VENIPUNCTURE: CPT

## 2025-06-25 PROCEDURE — 700111 HCHG RX REV CODE 636 W/ 250 OVERRIDE (IP): Performed by: SPECIALIST

## 2025-06-25 RX ORDER — 0.9 % SODIUM CHLORIDE 0.9 %
VIAL (ML) INJECTION PRN
OUTPATIENT
Start: 2026-06-26

## 2025-06-25 RX ORDER — ONDANSETRON 8 MG/1
8 TABLET, ORALLY DISINTEGRATING ORAL PRN
OUTPATIENT
Start: 2026-06-26

## 2025-06-25 RX ORDER — MEPERIDINE HYDROCHLORIDE 25 MG/ML
25 INJECTION INTRAMUSCULAR; INTRAVENOUS; SUBCUTANEOUS PRN
OUTPATIENT
Start: 2026-06-26

## 2025-06-25 RX ORDER — 0.9 % SODIUM CHLORIDE 0.9 %
10 VIAL (ML) INJECTION PRN
OUTPATIENT
Start: 2026-06-26

## 2025-06-25 RX ORDER — ONDANSETRON 2 MG/ML
8 INJECTION INTRAMUSCULAR; INTRAVENOUS PRN
OUTPATIENT
Start: 2026-06-26

## 2025-06-25 RX ORDER — SODIUM CHLORIDE 9 MG/ML
1000 INJECTION, SOLUTION INTRAVENOUS PRN
OUTPATIENT
Start: 2026-06-26

## 2025-06-25 RX ORDER — METHYLPREDNISOLONE SODIUM SUCCINATE 125 MG/2ML
125 INJECTION, POWDER, LYOPHILIZED, FOR SOLUTION INTRAMUSCULAR; INTRAVENOUS PRN
OUTPATIENT
Start: 2026-06-26

## 2025-06-25 RX ORDER — 0.9 % SODIUM CHLORIDE 0.9 %
3 VIAL (ML) INJECTION PRN
OUTPATIENT
Start: 2026-06-26

## 2025-06-25 RX ORDER — ZOLEDRONIC ACID 0.05 MG/ML
5 INJECTION, SOLUTION INTRAVENOUS ONCE
OUTPATIENT
Start: 2026-06-26 | End: 2026-06-26

## 2025-06-25 RX ORDER — ZOLEDRONIC ACID 0.05 MG/ML
5 INJECTION, SOLUTION INTRAVENOUS ONCE
Status: COMPLETED | OUTPATIENT
Start: 2025-06-25 | End: 2025-06-25

## 2025-06-25 RX ORDER — LORAZEPAM 1 MG/1
0.5 TABLET ORAL PRN
OUTPATIENT
Start: 2026-06-26

## 2025-06-25 RX ORDER — DIPHENHYDRAMINE HYDROCHLORIDE 50 MG/ML
25 INJECTION, SOLUTION INTRAMUSCULAR; INTRAVENOUS PRN
OUTPATIENT
Start: 2026-06-26

## 2025-06-25 RX ORDER — EPINEPHRINE 1 MG/ML(1)
0.5 AMPUL (ML) INJECTION PRN
OUTPATIENT
Start: 2026-06-26

## 2025-06-25 RX ORDER — SODIUM CHLORIDE 9 MG/ML
INJECTION, SOLUTION INTRAVENOUS CONTINUOUS
OUTPATIENT
Start: 2026-06-26

## 2025-06-25 RX ORDER — ALBUTEROL SULFATE 5 MG/ML
2.5 SOLUTION RESPIRATORY (INHALATION) PRN
OUTPATIENT
Start: 2026-06-26

## 2025-06-25 RX ORDER — ACETAMINOPHEN 325 MG/1
650 TABLET ORAL PRN
OUTPATIENT
Start: 2026-06-26

## 2025-06-25 RX ADMIN — ZOLEDRONIC ACID 5 MG: 5 INJECTION, SOLUTION INTRAVENOUS at 11:16

## 2025-06-25 NOTE — PROGRESS NOTES
Patient came into infusion independently. POC dicussed and patient is in agreement. Patient educated about taking daily calcium and vitamin D3. PIV established and labs drawn off IV start. Patient meets parameters to proceeds with treatment today. Reclast infused as ordered with no adverse events. PIV flushed and removed with tip intact. Patient left in stable condition with no future appointment.

## 2025-07-08 ENCOUNTER — TELEPHONE (OUTPATIENT)
Dept: CARDIOLOGY | Facility: MEDICAL CENTER | Age: 75
End: 2025-07-08
Payer: MEDICARE

## 2025-07-09 NOTE — TELEPHONE ENCOUNTER
Chart prep:     Called and verified patient, reminded patient to get CMP lab done ASAP before her Cardiology visit with CW on 7/18/2025 at 8:00 A.M. Also, gave directions to [facility] located at [address] [place] [address] As well as provided the office hours of Mon - Fri: 6 A.M. - 6 P.M. & Sat: 7 A.M. - 2 P.M. Specified that no appointment is needed for lab services. Patient stated she will get lab done before her appointment.

## 2025-07-10 ENCOUNTER — TELEPHONE (OUTPATIENT)
Dept: CARDIOLOGY | Facility: MEDICAL CENTER | Age: 75
End: 2025-07-10
Payer: MEDICARE

## 2025-07-10 NOTE — TELEPHONE ENCOUNTER
Last OV: 4/18/25  Proposed Surgery: Colonoscopy with Deep (Propofol) Sedation   Surgery Date: 8/15/25  Requesting Office Name: [facility]  Fax Number: [phone]  Preference of Location (default is surgery center unless specified by Cardiologist or RIO)  Prior Clearance Addressed: No    Is this a general clearance? YES   Anticoags/Antiplatelets: Apixaban   Anticoags/Antiplatelet managed by Cardiology? YES    Outstanding Cardiac Imaging : No  Ablation, Cardioversion, Stent, Cardiac Devices, Catheterization, Watchman: No  TAVR/Valve, Mitral Clip, Watchman (including open heart),: N/A   Recent Cardiac Hospitalization: No            When: Greater than 3 months since hospitalization   History (cardiac history): Past Medical History[1]        Is this a dental clearance? NO  Ablation, Cardioversion, Watchman, Stents, Cath, Devices within the last 3 months? No   If yes- Send dental wait letter, do not forward to provider for review.     TAVR / Valve, Mitral clip within the last 6 months? No  If yes- Send dental wait letter, do not forward to provider for review.     If completing a general clearance, continue per protocol.           Surgical Clearance Letter Sent: YES   **Scan clearance request letter into [facility].**          [1]   Past Medical History:  Diagnosis Date    Breath shortness     related to cystic fibrosis/ pt has frequent coughing    Bronchitis 2010    Cancer (HCC) 2015    skin ca    Coughing blood     intermittent cough unsure if allergies    Cystic fibrosis (HCC)     Diabetes (HCC)     diet controlled    Diverticulitis     Heart burn     Indigestion     Other specified disorder of intestines     diverticuli    Other specified symptom associated with female genital organs     pelvic prolapse    Pain     abdominal    Pain     hip     Pneumonia 06/2015

## 2025-07-10 NOTE — LETTER
July 10, 2025        Betina Petit    PROCEDURE/SURGERY CLEARANCE FORM    Date: 7/10/2025   Patient Name: Betina Petit    Dear Surgeon or Proceduralist,     The above patient is cleared to have the following procedure/surgery: Colonoscopy with Deep (Propofol) Sedation        Thank you for your request for cardiac stratification of our mutual patient Betina Petit 1950. We have reviewed their [facility] records; and to the best of our understanding this patient has not had stenting, ablation, watchman, cardiothoracic surgery or hospitalization for cardiovascular reasons in the past 6 months.  Betina Petit has been seen within the past 15 months and is considered to have non-modifiable cardiac risk for this low-risk procedure/surgery. They may proceed from a cardiovascular standpoint and may hold their antiplatelet/anticoagulation as briefly as possible. Please have patient resume this medication when hemodynamically stable to do so.     Aspirin or Prasugrel   - hold 7 days prior to procedure/surgery, resume when hemodynamically stable      Clopidrogrel or Ticagrelor  - hold 7 days for all neurological procedures, hold 5 days prior to all other procedure/surgery,  resume when hemodynamically stable     Warfarin - hold 7 days for all neurological procedures, hold 5 days prior to all other procedure/surgery and coordinate with [facility] ([phone]) INR testing and dose management.      Pradaxa/Xarelto/Eliquis/Savesya - hold 1 day prior to procedure for low bleeding risk procedure, 2 days for high bleeding risk procedure, or consider holding 3 days or longer for patients with reduced kidney function (CrCl <30mL/min) or spinal/cranial surgeries/procedures.      If they have a mechanical heart valve, please coordinate with [facility] ([phone]) the proper management of their anticoagulant in the periprocedural or perioperative period.      Some patients have higher risk for  cardiovascular complications or holding medication. If our patient has had prior complications of holding antiplatelet or anticoagulants in the past and we have seen them after these events, we have addressed these concerns with the patient. They are at an unknown degree of increased risk for recurrent complication.  You may hold anticoagulation/antiplatelets for the procedure or surgery if the benefits of the procedure or surgery outweigh this nonmodifiable risk.      If Betina Petit 1950 has new symptoms of heart failure decompensation, unstable arrythmia, or angina please reach out and we will assess the patient.      If you have other patient-specific concerns, please feel free to reach out to the patient's cardiologist directly at [phone].     Thank you,   [facility]    __ _Electronically signed________  Provider: Jamel Sinha M.D

## 2025-07-18 ENCOUNTER — TELEPHONE (OUTPATIENT)
Dept: CARDIOLOGY | Facility: MEDICAL CENTER | Age: 75
End: 2025-07-18

## 2025-07-18 ENCOUNTER — OFFICE VISIT (OUTPATIENT)
Dept: CARDIOLOGY | Facility: MEDICAL CENTER | Age: 75
End: 2025-07-18
Attending: INTERNAL MEDICINE
Payer: MEDICARE

## 2025-07-18 VITALS
HEART RATE: 72 BPM | RESPIRATION RATE: 16 BRPM | HEIGHT: 61 IN | SYSTOLIC BLOOD PRESSURE: 110 MMHG | DIASTOLIC BLOOD PRESSURE: 74 MMHG | BODY MASS INDEX: 37.38 KG/M2 | WEIGHT: 198 LBS | OXYGEN SATURATION: 95 %

## 2025-07-18 DIAGNOSIS — D68.69 HYPERCOAGULABLE STATE DUE TO PERSISTENT ATRIAL FIBRILLATION (HCC): ICD-10-CM

## 2025-07-18 DIAGNOSIS — I48.19 PERSISTENT ATRIAL FIBRILLATION (HCC): Primary | ICD-10-CM

## 2025-07-18 DIAGNOSIS — I10 PRIMARY HYPERTENSION: ICD-10-CM

## 2025-07-18 DIAGNOSIS — I50.32 CHF (CONGESTIVE HEART FAILURE), NYHA CLASS II, CHRONIC, DIASTOLIC (HCC): ICD-10-CM

## 2025-07-18 DIAGNOSIS — I48.19 HYPERCOAGULABLE STATE DUE TO PERSISTENT ATRIAL FIBRILLATION (HCC): ICD-10-CM

## 2025-07-18 PROCEDURE — 99214 OFFICE O/P EST MOD 30 MIN: CPT | Performed by: INTERNAL MEDICINE

## 2025-07-18 PROCEDURE — 99212 OFFICE O/P EST SF 10 MIN: CPT | Performed by: INTERNAL MEDICINE

## 2025-07-18 PROCEDURE — 3078F DIAST BP <80 MM HG: CPT | Performed by: INTERNAL MEDICINE

## 2025-07-18 PROCEDURE — G2211 COMPLEX E/M VISIT ADD ON: HCPCS | Performed by: INTERNAL MEDICINE

## 2025-07-18 PROCEDURE — 3074F SYST BP LT 130 MM HG: CPT | Performed by: INTERNAL MEDICINE

## 2025-07-18 RX ORDER — NITROFURANTOIN 25; 75 MG/1; MG/1
100 CAPSULE ORAL 2 TIMES DAILY
COMMUNITY
Start: 2025-05-01

## 2025-07-18 NOTE — LETTER
[facility] [facility]   [address], [address]  [place] [address]  Phone: [phone]  Fax: [phone]              Betina Marisabel  1950    Encounter Date: 7/18/2025    Jamel Sinha M.D.          PROGRESS NOTE:  No notes on file      Mona Aguayo M.D.  [address]  [facility] [address]  [facility] [address]  Via Fax: [phone]

## 2025-07-18 NOTE — PROGRESS NOTES
"Chief Complaint   Patient presents with    Atrial Fibrillation       Subjective     Betina Petit is a 75 y.o. female who presents today afib since 2025 complicate by CHF with h/o CHF     Doing well but often misses second doses    Past Medical History[1]  Past Surgical History[2]  Family History   Problem Relation Age of Onset    Cancer Mother     Alcohol abuse Father     Cancer Daughter      Social History     Socioeconomic History    Marital status:      Spouse name: Not on file    Number of children: Not on file    Years of education: Not on file    Highest education level: Not on file   Occupational History    Not on file   Tobacco Use    Smoking status: Former     Current packs/day: 0.00     Average packs/day: 1 pack/day for 20.0 years (20.0 ttl pk-yrs)     Types: Cigarettes     Start date: 1964     Quit date: 1984     Years since quittin.5    Smokeless tobacco: Never   Vaping Use    Vaping status: Never Used   Substance and Sexual Activity    Alcohol use: Yes     Alcohol/week: 1.8 oz     Types: 3 Standard drinks or equivalent per week    Drug use: No    Sexual activity: Not on file   Other Topics Concern    Not on file   Social History Narrative    Not on file     Social Drivers of Health     Financial Resource Strain: Not on file   Food Insecurity: Not on file   Transportation Needs: Not on file   Physical Activity: Not on file   Stress: Not on file   Social Connections: Not on file   Intimate Partner Violence: Not on file   Housing Stability: Not on file     Allergies[3]  Encounter Medications[4]  ROS           Objective     /74 (BP Location: Left arm, Patient Position: Sitting, BP Cuff Size: Adult)   Pulse 72   Resp 16   Ht 1.56 m (5' 1.42\")   Wt 89.8 kg (198 lb)   LMP 2006   SpO2 95%   BMI 36.90 kg/m²     Physical Exam  Constitutional:       General: She is not in acute distress.     Appearance: She is not diaphoretic.   Eyes:      General: No scleral icterus.  Neck: "      Vascular: No JVD.   Cardiovascular:      Rate and Rhythm: Normal rate.      Heart sounds: Normal heart sounds. No murmur heard.     No friction rub. No gallop.   Pulmonary:      Effort: No respiratory distress.      Breath sounds: No wheezing or rales.   Abdominal:      General: Bowel sounds are normal.      Palpations: Abdomen is soft.   Musculoskeletal:      Right lower leg: No edema.      Left lower leg: No edema.   Skin:     Findings: No rash.   Neurological:      Mental Status: She is alert. Mental status is at baseline.   Psychiatric:         Mood and Affect: Mood normal.            We reviewed in person the most recent labs  Recent Results (from the past 30 weeks)   EKG    Collection Time: 25 11:32 AM   Result Value Ref Range    Report       [facility]    Test Date:  2025  Pt Name:    TWILA CARVAJAL                   Department: [facility]  MRN:        [id number]                      Room:  Gender:     Female                       Technician: [facility]  :        [id number]                   Requested By:SUZI LEON  Order #:    [id number]                    Reading MD: Suzi Leon MD    Measurements  Intervals                                Axis  Rate:       69                           P:          22  AK:         189                          QRS:        74  QRSD:       110                          T:          34  QT:         384  QTc:        412    Interpretive Statements  Sinus rhythm  Compared to ECG 2020 10:17:54  NO SIGNIFICANT CHANGES  Electronically Signed On 2025 11:32:36 PDT by Suzi Leon MD     HEMOGLOBIN A1C    Collection Time: 25  4:30 PM   Result Value Ref Range    Glycohemoglobin 5.2 <5.7 %   CREATININE    Collection Time: 25 10:50 AM   Result Value Ref Range    Creatinine 0.96 0.50 - 1.40 mg/dL   IONIZED CALCIUM    Collection Time: 25 10:50 AM   Result Value Ref Range    Ionized Calcium 1.19 1.10 - 1.30 mmol/L   ESTIMATED  GFR    Collection Time: 06/25/25 10:50 AM   Result Value Ref Range    GFR (CKD-EPI) 61 >60 mL/min/1.73 m 2     [place]    Assessment & Plan     1. Persistent atrial fibrillation (HCC)        2. Hypercoagulable state due to persistent atrial fibrillation (HCC)        3. CHF (congestive heart failure), NYHA class II, chronic, diastolic (HCC)        4. Primary hypertension            Medical Decision Making: Today's Assessment/Status/Plan:        It was my pleasure to meet with Ms. Petit.    Blood pressure is well controlled.  She will continue to monitor and eat hearty healthy diet.      We addressed the management of atrial fibrillation.  She is on proper anticoagulation and rate or rhythm control as appropriate.  We addressed the potential side effects and laboratory follow-up for these medications.    We addressed the management of chronic anticoagulation at today's visit. She understands the risks and benefits of chronic anticoagulation.  We reviewed and verified the results of annual testing for anemia and kidney function.    SWITCH TO XARELTO FOR EASE OF DOSING CONFIRMED IT IS okay with     Chumwzfc-Cvsrnqr-Xglvtk&Ivacaf (TRIKAFTA)         We addressed the management of congestive heart failure. We addressed the potential side effects and laboratory follow-up for these medications. She is on proper mineralacorticoid, angiotensin/ace inhibition with neprilysin inhibition, SGLTs inhibitor and beta-blockers if appropriate.  We addressed the potential side effects and regular laboratory follow-up for these medications.      I will see Ms. Petit back in 1 year time and encouraged her to follow up with us over the phone or electronically using my MyChart as issues arise.    It is my pleasure to participate in the care of Ms. Petit.  Please do not hesitate to contact me with questions or concerns.    Jamel Sinha MD PhD FAC  Cardiologist [facility]    Please note that this  dictation was created using voice recognition software. There may be errors I did not discover before finalizing the note.     () Today's E/M visit is associated with medical care services that serve as the continuing focal point for all needed health care services and/or with medical care services that  are part of ongoing care related to a patient's single, serious condition, or a complex condition: This includes  furnishing services to patients on an ongoing basis that result in care that is personalized  to the patient. The services result in a comprehensive, longitudinal, and continuous  relationship with the patient and involve delivery of team-based care that is accessible, coordinated with other practitioners and providers, and integrated with the broader health  care landscape.                        [1]   Past Medical History:  Diagnosis Date    Breath shortness     related to cystic fibrosis/ pt has frequent coughing    Bronchitis 2010    Cancer (HCC) 2015    skin ca    Coughing blood     intermittent cough unsure if allergies    Cystic fibrosis (HCC)     Diabetes (HCC)     diet controlled    Diverticulitis     Heart burn     Indigestion     Other specified disorder of intestines     diverticuli    Other specified symptom associated with female genital organs     pelvic prolapse    Pain     abdominal    Pain     hip     Pneumonia 06/2015   [2]   Past Surgical History:  Procedure Laterality Date    VENTRAL HERNIA REPAIR ROBOTIC XI N/A 2/26/2020    Procedure: REPAIR, HERNIA, VENTRAL, ROBOT-ASSISTED, USING DA JEREMY XI- FOR RECURRENT REDUCIBLE INCISIONAL HERNIA WITH MESH;  Surgeon: John H Ganser, M.D.;  Location: SURGERY [facility];  Service: General    HERNIA REPAIR  4/4/2016    Procedure: Abdominal wall HERNIA REPAIR  ;  Surgeon: Brandt Munson Jr., M.D.;  Location: SURGERY [facility];  Service:     FLAP FREE Bilateral 4/4/2016    Procedure: bilateral muscle flap;  Surgeon: Brandt Gunn  Arpit Velazquez M.D.;  Location: SURGERY [facility];  Service:     LYSIS ADHESIONS GENERAL  4/4/2016    Procedure: LYSIS ADHESIONS GENERAL;  Surgeon: Brandt Munson Jr., M.D.;  Location: SURGERY [facility];  Service:     COLOSTOMY TAKEDOWN  9/7/2015    Procedure: COLOSTOMY REVERSAL;  Surgeon: Jessica Flores M.D.;  Location: SURGERY [facility];  Service:     COLOSTOMY  6/22/2015    Procedure: COLOSTOMY POSSIBLE;  Surgeon: Jessica Flores M.D.;  Location: SURGERY [facility];  Service:     COLON RESECTION LAPAROSCOPIC  6/22/2015    Procedure: COLON RESECTION LAPAROSCOPIC TO OPEN SIGMOID COLECTOMY;  Surgeon: Jessica Flores M.D.;  Location: SURGERY [facility];  Service:     CHOLECYSTECTOMY      OTHER      tonsils   [3]   Allergies  Allergen Reactions    Albuterol      jittery    Ambien [Zolpidem]      Memory loss    Bee Venom     Flagyl [Metronidazole] Vomiting     Severe nausea and vomiting      Sulfa Drugs      unknown    Tape      Paper tape is ok   [4]   Outpatient Encounter Medications as of 7/18/2025   Medication Sig Dispense Refill    nitrofurantoin (MACROBID) 100 MG Cap Take 100 mg by mouth 2 times a day.      metoprolol SR (TOPROL XL) 25 MG TABLET SR 24 HR Take 75 mg by mouth every day.      vitamin D3 (CHOLECALCIFEROL) 400 UNIT Tab Take 1,000 Units by mouth every day.      B Complex-C (VITAMIN B + C COMPLEX) Tab Take 1 Tablet by mouth every day.      Xxpbfogv-Sepwofb-Apzqxz&Ivacaf (TRIKAFTA) 100-50-75 & 150 MG Tablet Therapy Pack Take  by mouth.      omeprazole (PRILOSEC OTC) 20 MG tablet Take 20 mg by mouth every day.      Pancrelipase, Lip-Prot-Amyl, (CREON) 14263 units Cap DR Particles Take 2 tabs PO w/ meals TID & 1 tab PO w/ snacks TID, 9 cap/day, 270 caps per month, & 810 caps per 90 days, please disp 90 days if possible      diazepam (VALIUM) 2 MG Tab Take 4 mg by mouth 1 time daily as needed.      furosemide (LASIX) 20 MG Tab Take 1 Tablet by mouth every day. (Patient not  taking: Reported on 7/18/2025) 90 Tablet 3    potassium Chloride ER (K-TAB) 20 MEQ Tab CR tablet Take 1 Tablet by mouth 1 time a day as needed (when you need the extra dose of furosemide). (Patient not taking: Reported on 7/18/2025) 90 Tablet 3    [DISCONTINUED] apixaban (ELIQUIS) 5mg Tab Take 5 mg by mouth.      [DISCONTINUED] HYDROcodone-acetaminophen (NORCO) 5-325 MG Tab per tablet Take 1 Tab by mouth 1 time daily as needed. (Patient not taking: Reported on 7/18/2025)      [DISCONTINUED] levalbuterol (XOPENEX HFA) 45 MCG/ACT inhaler Inhale 2 Puffs by mouth every four hours as needed for Shortness of Breath. (Patient not taking: Reported on 7/18/2025) 1 Inhaler 11     No facility-administered encounter medications on file as of 7/18/2025.

## 2025-07-18 NOTE — TELEPHONE ENCOUNTER
CW    Caller: Betina Petit    Topic/issue: MEDICAL ADVICE    Betina states that she was just in today 7/18 for an appointment and she forgot to ask about the ZIO results. Betina would appreciate a return call to discuss these findings. Please advise.    Thank you,  Weston HAGER    Callback Number: [phone] (home) [phone] (work)

## 2025-07-18 NOTE — TELEPHONE ENCOUNTER
Prior Authorization for rivaroxaban (XARELTO) 20 MG Tab tablet has been approved for a quantity of 90 , day supply 90    Prior Authorization reference number: [id number]  Insurance: [employer]  Effective dates: 01/01/25 until 12/31/2025  Copay: $Not contracted     Is patient eligible to fill with [facility]? No, test claim rejected stating Not contracted.    Next Steps: Will release to preferred pharmacy, [facility] on [address]. Will call [facility] to see if cost is manageable or needs FA

## 2025-07-18 NOTE — TELEPHONE ENCOUNTER
Received [employer] request via [employer]  for rivaroxaban (XARELTO) 20 MG Tab tablet. (Quantity:90, Day Supply:90)     Insurance: [employer]  Member ID:  [id number]  BIN: [id number]  PCN: [id number]  Group: [id number]     Ran Test claim via [place] & medication Rejects stating prior authorization is required.    Prior Authorization for rivaroxaban (XARELTO) 20 MG Tab tablet (Quantity: 90, Days: 90) has been submitted via Cover My Meds: Key ([id number])        Will follow up in 24-48 business hours.

## 2025-07-18 NOTE — TELEPHONE ENCOUNTER
I will forward message to CW     -------------------------------------------------------  CW - please advise pt forgot to ask about Zio result at time of appt today.  Thank you

## 2025-07-20 NOTE — TELEPHONE ENCOUNTER
In February from the [facility] records she had 6% atrial fibrillation, lasting up to 12 hours, I have these results at her initial appointment.  I do not have record of anything more recent.  The monitor is acceptable but her other testing reinforces the importance of medications and blood thinners

## 2025-07-22 NOTE — TELEPHONE ENCOUNTER
Phone Number Called: [phone]    Call outcome: Spoke to patient regarding message below.    Message: Called to discuss regarding Zio result. Pt stated that she had a home sleep study - I did refer her to PCP or Pulmonologist and she said she'll reach out to them. She did the Zio result. No other questions or concerns at the end of our phone call.

## (undated) DEVICE — BINDER ABDOMINAL 30X45X12IN - FITS 30-45IN WAIST 12IN HIGH

## (undated) DEVICE — NEEDLE DRIVER MEGA SUTURECUT DA VINCI 15X'S REUSABLE

## (undated) DEVICE — SUTURE 4-0 VICRYL PLUS FS-2 - 27 INCH (36/BX)

## (undated) DEVICE — DRESSING TRANSPARENT FILM TEGADERM 2.375 X 2.75"  (100EA/BX)"

## (undated) DEVICE — KIT ANESTHESIA W/CIRCUIT & 3/LT BAG W/FILTER (20EA/CA)

## (undated) DEVICE — SLEEVE, VASO, THIGH, MED

## (undated) DEVICE — KIT ROOM DECONTAMINATION

## (undated) DEVICE — SENSOR SPO2 NEO LNCS ADHESIVE (20/BX) SEE USER NOTES

## (undated) DEVICE — NEEDLE INSFL 120MM 14GA VRRS - (20/BX)

## (undated) DEVICE — DRAPE COLUMN  BOX OF 20

## (undated) DEVICE — SUTURE GENERAL

## (undated) DEVICE — OBTURATOR BLADELESS STANDARD 8MM (6EA/BX)

## (undated) DEVICE — DRESSING TRANSPARENT FILM TEGADERM 4 X 4.75" (50EA/BX)"

## (undated) DEVICE — CHLORAPREP 26 ML APPLICATOR - ORANGE TINT(25/CA)

## (undated) DEVICE — CANISTER SUCTION 3000ML MECHANICAL FILTER AUTO SHUTOFF MEDI-VAC NONSTERILE LF DISP  (40EA/CA)

## (undated) DEVICE — GLOVE SZ 6.5 BIOGEL PI MICRO - PF LF (50PR/BX)

## (undated) DEVICE — SPONGE GAUZESTER. 2X2 4-PL - (2/PK 50PK/BX 30BX/CS)

## (undated) DEVICE — COVER TIP ENDOWRIST HOT SHEAR - (10EA/BX) DA VINCI

## (undated) DEVICE — SUTURE 2-0 20CM STRATAFIX SPIRAL SH NEEDLE (12/BX)

## (undated) DEVICE — SEAL 5MM-8MM UNIVERSAL  BOX OF 10

## (undated) DEVICE — GOWN SURGEONS LARGE - (32/CA)

## (undated) DEVICE — REDUCER XI STAPLER 12MM TO 8MM (6EA/BX)

## (undated) DEVICE — SUTURE 2-0 VICRYL PLUS CT-2 - 27 INCH (36/BX)

## (undated) DEVICE — TUBING CLEARLINK DUO-VENT - C-FLO (48EA/CA)

## (undated) DEVICE — ROBOTIC SURGERY SERVICES

## (undated) DEVICE — NEPTUNE 4 PORT MANIFOLD - (20/PK)

## (undated) DEVICE — SUCTION INSTRUMENT YANKAUER BULBOUS TIP W/O VENT (50EA/CA)

## (undated) DEVICE — ELECTRODE 850 FOAM ADHESIVE - HYDROGEL RADIOTRNSPRNT (50/PK)

## (undated) DEVICE — HEAD HOLDER JUNIOR/ADULT

## (undated) DEVICE — DRAPE ARM  BOX OF 20

## (undated) DEVICE — GOWN SURGEONS X-LARGE - DISP. (30/CA)

## (undated) DEVICE — SPONGE GAUZESTER 4 X 4 4PLY - (128PK/CA)

## (undated) DEVICE — SYRINGE 30 ML LS (56/BX)

## (undated) DEVICE — GLOVE BIOGEL M SZ 8 SURGICAL PF LTX - (50/BX 4BX/CA)

## (undated) DEVICE — SET EXTENSION WITH 2 PORTS (48EA/CA) ***PART #2C8610 IS A SUBSTITUTE*****

## (undated) DEVICE — SEAL CANNULA STAPLER 12 MM (10EA/BX)

## (undated) DEVICE — LACTATED RINGERS INJ 1000 ML - (14EA/CA 60CA/PF)

## (undated) DEVICE — TUBE E-T HI-LO CUFF 7.5MM (10EA/PK)

## (undated) DEVICE — GLOVE BIOGEL PI INDICATOR SZ 6.5 SURGICAL PF LF - (50/BX 4BX/CA)

## (undated) DEVICE — MASK ANESTHESIA ADULT  - (100/CA)

## (undated) DEVICE — WATER IRRIGATION STERILE 1000ML (12EA/CA)

## (undated) DEVICE — BLADE SURGICAL #15 - (50/BX 3BX/CA)

## (undated) DEVICE — TUBING LAPAROSCOPIC PLUME DEVICE (10EA/CA)

## (undated) DEVICE — SHEARS MONOPOLAR CURVED  DA VINCI 10X'S REUSABLE

## (undated) DEVICE — PROTECTOR ULNA NERVE - (36PR/CA)

## (undated) DEVICE — GOWN WARMING STANDARD FLEX - (30/CA)

## (undated) DEVICE — SET LEADWIRE 5 LEAD BEDSIDE DISPOSABLE ECG (1SET OF 5/EA)

## (undated) DEVICE — DERMABOND ADVANCED - (12EA/BX)

## (undated) DEVICE — Device